# Patient Record
Sex: MALE | Race: WHITE
[De-identification: names, ages, dates, MRNs, and addresses within clinical notes are randomized per-mention and may not be internally consistent; named-entity substitution may affect disease eponyms.]

---

## 2017-01-12 ENCOUNTER — APPOINTMENT (OUTPATIENT)
Dept: RHEUMATOLOGY | Facility: CLINIC | Age: 77
End: 2017-01-12

## 2017-01-31 ENCOUNTER — APPOINTMENT (OUTPATIENT)
Dept: RHEUMATOLOGY | Facility: CLINIC | Age: 77
End: 2017-01-31

## 2017-02-01 ENCOUNTER — RX RENEWAL (OUTPATIENT)
Age: 77
End: 2017-02-01

## 2017-02-01 RX ORDER — FUROSEMIDE 40 MG/1
40 TABLET ORAL
Qty: 90 | Refills: 0 | Status: ACTIVE | COMMUNITY
Start: 2016-10-20

## 2017-02-01 RX ORDER — METHYLPREDNISOLONE 4 MG/1
4 TABLET ORAL
Qty: 21 | Refills: 6 | Status: ACTIVE | COMMUNITY
Start: 2017-02-01 | End: 1900-01-01

## 2021-08-09 ENCOUNTER — INPATIENT (INPATIENT)
Facility: HOSPITAL | Age: 81
LOS: 13 days | Discharge: NURSING, REHAB OR LONG-TERM CARE | DRG: 872 | End: 2021-08-23
Attending: FAMILY MEDICINE | Admitting: FAMILY MEDICINE
Payer: MEDICARE

## 2021-08-09 VITALS
TEMPERATURE: 99 F | SYSTOLIC BLOOD PRESSURE: 154 MMHG | RESPIRATION RATE: 18 BRPM | WEIGHT: 199.96 LBS | DIASTOLIC BLOOD PRESSURE: 69 MMHG | OXYGEN SATURATION: 97 % | HEART RATE: 80 BPM | HEIGHT: 74 IN

## 2021-08-09 DIAGNOSIS — L03.115 CELLULITIS OF RIGHT LOWER LIMB: ICD-10-CM

## 2021-08-09 LAB
ALBUMIN SERPL ELPH-MCNC: 2.5 G/DL — LOW (ref 3.3–5)
ALBUMIN SERPL ELPH-MCNC: 2.9 G/DL — LOW (ref 3.3–5)
ALP SERPL-CCNC: 76 U/L — SIGNIFICANT CHANGE UP (ref 40–120)
ALP SERPL-CCNC: 89 U/L — SIGNIFICANT CHANGE UP (ref 40–120)
ALT FLD-CCNC: 15 U/L — SIGNIFICANT CHANGE UP (ref 12–78)
ALT FLD-CCNC: 16 U/L — SIGNIFICANT CHANGE UP (ref 12–78)
ANION GAP SERPL CALC-SCNC: 6 MMOL/L — SIGNIFICANT CHANGE UP (ref 5–17)
ANION GAP SERPL CALC-SCNC: 6 MMOL/L — SIGNIFICANT CHANGE UP (ref 5–17)
APTT BLD: 32.2 SEC — SIGNIFICANT CHANGE UP (ref 27.5–35.5)
AST SERPL-CCNC: 14 U/L — LOW (ref 15–37)
AST SERPL-CCNC: 21 U/L — SIGNIFICANT CHANGE UP (ref 15–37)
BASOPHILS # BLD AUTO: 0.03 K/UL — SIGNIFICANT CHANGE UP (ref 0–0.2)
BASOPHILS NFR BLD AUTO: 0.2 % — SIGNIFICANT CHANGE UP (ref 0–2)
BILIRUB SERPL-MCNC: 2.2 MG/DL — HIGH (ref 0.2–1.2)
BILIRUB SERPL-MCNC: 2.3 MG/DL — HIGH (ref 0.2–1.2)
BUN SERPL-MCNC: 22 MG/DL — SIGNIFICANT CHANGE UP (ref 7–23)
BUN SERPL-MCNC: 23 MG/DL — SIGNIFICANT CHANGE UP (ref 7–23)
CALCIUM SERPL-MCNC: 8.2 MG/DL — LOW (ref 8.5–10.1)
CALCIUM SERPL-MCNC: 9 MG/DL — SIGNIFICANT CHANGE UP (ref 8.5–10.1)
CHLORIDE SERPL-SCNC: 104 MMOL/L — SIGNIFICANT CHANGE UP (ref 96–108)
CHLORIDE SERPL-SCNC: 104 MMOL/L — SIGNIFICANT CHANGE UP (ref 96–108)
CO2 SERPL-SCNC: 30 MMOL/L — SIGNIFICANT CHANGE UP (ref 22–31)
CO2 SERPL-SCNC: 32 MMOL/L — HIGH (ref 22–31)
CREAT SERPL-MCNC: 1.13 MG/DL — SIGNIFICANT CHANGE UP (ref 0.5–1.3)
CREAT SERPL-MCNC: 1.25 MG/DL — SIGNIFICANT CHANGE UP (ref 0.5–1.3)
EOSINOPHIL # BLD AUTO: 0.01 K/UL — SIGNIFICANT CHANGE UP (ref 0–0.5)
EOSINOPHIL NFR BLD AUTO: 0.1 % — SIGNIFICANT CHANGE UP (ref 0–6)
GLUCOSE SERPL-MCNC: 110 MG/DL — HIGH (ref 70–99)
GLUCOSE SERPL-MCNC: 112 MG/DL — HIGH (ref 70–99)
HCT VFR BLD CALC: 42.1 % — SIGNIFICANT CHANGE UP (ref 39–50)
HGB BLD-MCNC: 14.4 G/DL — SIGNIFICANT CHANGE UP (ref 13–17)
IMM GRANULOCYTES NFR BLD AUTO: 0.4 % — SIGNIFICANT CHANGE UP (ref 0–1.5)
INR BLD: 1.36 RATIO — HIGH (ref 0.88–1.16)
LACTATE SERPL-SCNC: 1.3 MMOL/L — SIGNIFICANT CHANGE UP (ref 0.7–2)
LYMPHOCYTES # BLD AUTO: 0.91 K/UL — LOW (ref 1–3.3)
LYMPHOCYTES # BLD AUTO: 7.3 % — LOW (ref 13–44)
MAGNESIUM SERPL-MCNC: 2 MG/DL — SIGNIFICANT CHANGE UP (ref 1.6–2.6)
MCHC RBC-ENTMCNC: 33.5 PG — SIGNIFICANT CHANGE UP (ref 27–34)
MCHC RBC-ENTMCNC: 34.2 GM/DL — SIGNIFICANT CHANGE UP (ref 32–36)
MCV RBC AUTO: 97.9 FL — SIGNIFICANT CHANGE UP (ref 80–100)
MONOCYTES # BLD AUTO: 1.05 K/UL — HIGH (ref 0–0.9)
MONOCYTES NFR BLD AUTO: 8.5 % — SIGNIFICANT CHANGE UP (ref 2–14)
NEUTROPHILS # BLD AUTO: 10.35 K/UL — HIGH (ref 1.8–7.4)
NEUTROPHILS NFR BLD AUTO: 83.5 % — HIGH (ref 43–77)
NT-PROBNP SERPL-SCNC: 1715 PG/ML — HIGH (ref 0–450)
PLATELET # BLD AUTO: 270 K/UL — SIGNIFICANT CHANGE UP (ref 150–400)
POTASSIUM SERPL-MCNC: 2.2 MMOL/L — CRITICAL LOW (ref 3.5–5.3)
POTASSIUM SERPL-MCNC: 2.5 MMOL/L — CRITICAL LOW (ref 3.5–5.3)
POTASSIUM SERPL-SCNC: 2.2 MMOL/L — CRITICAL LOW (ref 3.5–5.3)
POTASSIUM SERPL-SCNC: 2.5 MMOL/L — CRITICAL LOW (ref 3.5–5.3)
PROT SERPL-MCNC: 6.1 GM/DL — SIGNIFICANT CHANGE UP (ref 6–8.3)
PROT SERPL-MCNC: 7 GM/DL — SIGNIFICANT CHANGE UP (ref 6–8.3)
PROTHROM AB SERPL-ACNC: 15.5 SEC — HIGH (ref 10.6–13.6)
RBC # BLD: 4.3 M/UL — SIGNIFICANT CHANGE UP (ref 4.2–5.8)
RBC # FLD: 13.2 % — SIGNIFICANT CHANGE UP (ref 10.3–14.5)
SARS-COV-2 RNA SPEC QL NAA+PROBE: SIGNIFICANT CHANGE UP
SODIUM SERPL-SCNC: 140 MMOL/L — SIGNIFICANT CHANGE UP (ref 135–145)
SODIUM SERPL-SCNC: 142 MMOL/L — SIGNIFICANT CHANGE UP (ref 135–145)
TROPONIN I SERPL-MCNC: <0.015 NG/ML — SIGNIFICANT CHANGE UP (ref 0.01–0.04)
WBC # BLD: 12.4 K/UL — HIGH (ref 3.8–10.5)
WBC # FLD AUTO: 12.4 K/UL — HIGH (ref 3.8–10.5)

## 2021-08-09 PROCEDURE — 87205 SMEAR GRAM STAIN: CPT

## 2021-08-09 PROCEDURE — 80053 COMPREHEN METABOLIC PANEL: CPT

## 2021-08-09 PROCEDURE — 89060 EXAM SYNOVIAL FLUID CRYSTALS: CPT

## 2021-08-09 PROCEDURE — 36415 COLL VENOUS BLD VENIPUNCTURE: CPT

## 2021-08-09 PROCEDURE — 83735 ASSAY OF MAGNESIUM: CPT

## 2021-08-09 PROCEDURE — 89051 BODY FLUID CELL COUNT: CPT

## 2021-08-09 PROCEDURE — 93970 EXTREMITY STUDY: CPT

## 2021-08-09 PROCEDURE — 71045 X-RAY EXAM CHEST 1 VIEW: CPT | Mod: 26

## 2021-08-09 PROCEDURE — 85027 COMPLETE CBC AUTOMATED: CPT

## 2021-08-09 PROCEDURE — 84100 ASSAY OF PHOSPHORUS: CPT

## 2021-08-09 PROCEDURE — U0005: CPT

## 2021-08-09 PROCEDURE — 85652 RBC SED RATE AUTOMATED: CPT

## 2021-08-09 PROCEDURE — 88305 TISSUE EXAM BY PATHOLOGIST: CPT

## 2021-08-09 PROCEDURE — 73560 X-RAY EXAM OF KNEE 1 OR 2: CPT | Mod: RT

## 2021-08-09 PROCEDURE — 80061 LIPID PANEL: CPT

## 2021-08-09 PROCEDURE — 93970 EXTREMITY STUDY: CPT | Mod: 26

## 2021-08-09 PROCEDURE — 97530 THERAPEUTIC ACTIVITIES: CPT | Mod: GP

## 2021-08-09 PROCEDURE — 87493 C DIFF AMPLIFIED PROBE: CPT

## 2021-08-09 PROCEDURE — 81376 HLA II TYPING 1 LOCUS LR: CPT

## 2021-08-09 PROCEDURE — 93010 ELECTROCARDIOGRAM REPORT: CPT

## 2021-08-09 PROCEDURE — 83036 HEMOGLOBIN GLYCOSYLATED A1C: CPT

## 2021-08-09 PROCEDURE — 94760 N-INVAS EAR/PLS OXIMETRY 1: CPT

## 2021-08-09 PROCEDURE — 97163 PT EVAL HIGH COMPLEX 45 MIN: CPT | Mod: GP

## 2021-08-09 PROCEDURE — 82784 ASSAY IGA/IGD/IGG/IGM EACH: CPT

## 2021-08-09 PROCEDURE — 97116 GAIT TRAINING THERAPY: CPT | Mod: GP

## 2021-08-09 PROCEDURE — 87507 IADNA-DNA/RNA PROBE TQ 12-25: CPT

## 2021-08-09 PROCEDURE — U0003: CPT

## 2021-08-09 PROCEDURE — 86140 C-REACTIVE PROTEIN: CPT

## 2021-08-09 PROCEDURE — 80048 BASIC METABOLIC PNL TOTAL CA: CPT

## 2021-08-09 PROCEDURE — 99285 EMERGENCY DEPT VISIT HI MDM: CPT

## 2021-08-09 PROCEDURE — 94640 AIRWAY INHALATION TREATMENT: CPT

## 2021-08-09 PROCEDURE — 86769 SARS-COV-2 COVID-19 ANTIBODY: CPT

## 2021-08-09 PROCEDURE — 99223 1ST HOSP IP/OBS HIGH 75: CPT

## 2021-08-09 PROCEDURE — 74177 CT ABD & PELVIS W/CONTRAST: CPT

## 2021-08-09 RX ORDER — ONDANSETRON 8 MG/1
4 TABLET, FILM COATED ORAL EVERY 8 HOURS
Refills: 0 | Status: DISCONTINUED | OUTPATIENT
Start: 2021-08-09 | End: 2021-08-23

## 2021-08-09 RX ORDER — CEFTRIAXONE 500 MG/1
INJECTION, POWDER, FOR SOLUTION INTRAMUSCULAR; INTRAVENOUS
Refills: 0 | Status: DISCONTINUED | OUTPATIENT
Start: 2021-08-09 | End: 2021-08-09

## 2021-08-09 RX ORDER — ENOXAPARIN SODIUM 100 MG/ML
40 INJECTION SUBCUTANEOUS DAILY
Refills: 0 | Status: DISCONTINUED | OUTPATIENT
Start: 2021-08-09 | End: 2021-08-23

## 2021-08-09 RX ORDER — CEFTRIAXONE 500 MG/1
1000 INJECTION, POWDER, FOR SOLUTION INTRAMUSCULAR; INTRAVENOUS EVERY 24 HOURS
Refills: 0 | Status: DISCONTINUED | OUTPATIENT
Start: 2021-08-10 | End: 2021-08-15

## 2021-08-09 RX ORDER — ATORVASTATIN CALCIUM 80 MG/1
10 TABLET, FILM COATED ORAL AT BEDTIME
Refills: 0 | Status: DISCONTINUED | OUTPATIENT
Start: 2021-08-09 | End: 2021-08-23

## 2021-08-09 RX ORDER — ACETAMINOPHEN 500 MG
650 TABLET ORAL EVERY 6 HOURS
Refills: 0 | Status: DISCONTINUED | OUTPATIENT
Start: 2021-08-09 | End: 2021-08-23

## 2021-08-09 RX ORDER — TIOTROPIUM BROMIDE 18 UG/1
1 CAPSULE ORAL; RESPIRATORY (INHALATION) DAILY
Refills: 0 | Status: DISCONTINUED | OUTPATIENT
Start: 2021-08-09 | End: 2021-08-23

## 2021-08-09 RX ORDER — ATORVASTATIN CALCIUM 80 MG/1
0 TABLET, FILM COATED ORAL
Qty: 0 | Refills: 0 | DISCHARGE

## 2021-08-09 RX ORDER — METOPROLOL TARTRATE 50 MG
25 TABLET ORAL
Refills: 0 | Status: DISCONTINUED | OUTPATIENT
Start: 2021-08-09 | End: 2021-08-23

## 2021-08-09 RX ORDER — CEFTRIAXONE 500 MG/1
1000 INJECTION, POWDER, FOR SOLUTION INTRAMUSCULAR; INTRAVENOUS ONCE
Refills: 0 | Status: COMPLETED | OUTPATIENT
Start: 2021-08-09 | End: 2021-08-09

## 2021-08-09 RX ORDER — DILTIAZEM HCL 120 MG
240 CAPSULE, EXT RELEASE 24 HR ORAL DAILY
Refills: 0 | Status: DISCONTINUED | OUTPATIENT
Start: 2021-08-09 | End: 2021-08-23

## 2021-08-09 RX ORDER — POTASSIUM CHLORIDE 20 MEQ
0 PACKET (EA) ORAL
Qty: 0 | Refills: 0 | DISCHARGE

## 2021-08-09 RX ORDER — POTASSIUM CHLORIDE 20 MEQ
10 PACKET (EA) ORAL
Refills: 0 | Status: COMPLETED | OUTPATIENT
Start: 2021-08-09 | End: 2021-08-10

## 2021-08-09 RX ORDER — LANOLIN ALCOHOL/MO/W.PET/CERES
3 CREAM (GRAM) TOPICAL AT BEDTIME
Refills: 0 | Status: DISCONTINUED | OUTPATIENT
Start: 2021-08-09 | End: 2021-08-23

## 2021-08-09 RX ORDER — FLUTICASONE PROPIONATE AND SALMETEROL 50; 250 UG/1; UG/1
0 POWDER ORAL; RESPIRATORY (INHALATION)
Qty: 0 | Refills: 0 | DISCHARGE

## 2021-08-09 RX ORDER — TIOTROPIUM BROMIDE 18 UG/1
0 CAPSULE ORAL; RESPIRATORY (INHALATION)
Qty: 0 | Refills: 0 | DISCHARGE

## 2021-08-09 RX ORDER — POTASSIUM CHLORIDE 20 MEQ
10 PACKET (EA) ORAL ONCE
Refills: 0 | Status: COMPLETED | OUTPATIENT
Start: 2021-08-09 | End: 2021-08-09

## 2021-08-09 RX ORDER — POTASSIUM CHLORIDE 20 MEQ
40 PACKET (EA) ORAL ONCE
Refills: 0 | Status: COMPLETED | OUTPATIENT
Start: 2021-08-09 | End: 2021-08-09

## 2021-08-09 RX ORDER — FUROSEMIDE 40 MG
40 TABLET ORAL DAILY
Refills: 0 | Status: DISCONTINUED | OUTPATIENT
Start: 2021-08-10 | End: 2021-08-11

## 2021-08-09 RX ORDER — BUDESONIDE AND FORMOTEROL FUMARATE DIHYDRATE 160; 4.5 UG/1; UG/1
2 AEROSOL RESPIRATORY (INHALATION)
Refills: 0 | Status: DISCONTINUED | OUTPATIENT
Start: 2021-08-09 | End: 2021-08-23

## 2021-08-09 RX ORDER — FOLIC ACID 0.8 MG
1 TABLET ORAL DAILY
Refills: 0 | Status: DISCONTINUED | OUTPATIENT
Start: 2021-08-09 | End: 2021-08-23

## 2021-08-09 RX ORDER — VANCOMYCIN HCL 1 G
1250 VIAL (EA) INTRAVENOUS ONCE
Refills: 0 | Status: COMPLETED | OUTPATIENT
Start: 2021-08-09 | End: 2021-08-09

## 2021-08-09 RX ORDER — FUROSEMIDE 40 MG
80 TABLET ORAL ONCE
Refills: 0 | Status: COMPLETED | OUTPATIENT
Start: 2021-08-09 | End: 2021-08-09

## 2021-08-09 RX ORDER — LISINOPRIL 2.5 MG/1
10 TABLET ORAL DAILY
Refills: 0 | Status: DISCONTINUED | OUTPATIENT
Start: 2021-08-09 | End: 2021-08-23

## 2021-08-09 RX ORDER — METOPROLOL TARTRATE 50 MG
0 TABLET ORAL
Qty: 0 | Refills: 0 | DISCHARGE

## 2021-08-09 RX ORDER — FUROSEMIDE 40 MG
0 TABLET ORAL
Qty: 0 | Refills: 0 | DISCHARGE

## 2021-08-09 RX ORDER — DILTIAZEM HCL 120 MG
0 CAPSULE, EXT RELEASE 24 HR ORAL
Qty: 0 | Refills: 0 | DISCHARGE

## 2021-08-09 RX ORDER — LISINOPRIL 2.5 MG/1
0 TABLET ORAL
Qty: 0 | Refills: 0 | DISCHARGE

## 2021-08-09 RX ORDER — CEFTRIAXONE 500 MG/1
INJECTION, POWDER, FOR SOLUTION INTRAMUSCULAR; INTRAVENOUS
Refills: 0 | Status: DISCONTINUED | OUTPATIENT
Start: 2021-08-09 | End: 2021-08-15

## 2021-08-09 RX ORDER — POTASSIUM CHLORIDE 20 MEQ
20 PACKET (EA) ORAL DAILY
Refills: 0 | Status: DISCONTINUED | OUTPATIENT
Start: 2021-08-09 | End: 2021-08-23

## 2021-08-09 RX ADMIN — Medication 40 MILLIEQUIVALENT(S): at 20:05

## 2021-08-09 RX ADMIN — Medication 166.67 MILLIGRAM(S): at 15:01

## 2021-08-09 RX ADMIN — CEFTRIAXONE 1000 MILLIGRAM(S): 500 INJECTION, POWDER, FOR SOLUTION INTRAMUSCULAR; INTRAVENOUS at 20:05

## 2021-08-09 RX ADMIN — ATORVASTATIN CALCIUM 10 MILLIGRAM(S): 80 TABLET, FILM COATED ORAL at 21:59

## 2021-08-09 RX ADMIN — Medication 80 MILLIGRAM(S): at 15:01

## 2021-08-09 RX ADMIN — Medication 40 MILLIEQUIVALENT(S): at 15:45

## 2021-08-09 RX ADMIN — Medication 25 MILLIGRAM(S): at 21:59

## 2021-08-09 RX ADMIN — Medication 100 MILLIEQUIVALENT(S): at 23:10

## 2021-08-09 RX ADMIN — Medication 100 MILLIEQUIVALENT(S): at 15:45

## 2021-08-09 RX ADMIN — Medication 100 MILLIEQUIVALENT(S): at 22:12

## 2021-08-09 NOTE — ED PROVIDER NOTE - CLINICAL SUMMARY MEDICAL DECISION MAKING FREE TEXT BOX
B/L lower extremity edema, developing right extremity cellulitis. Pt has been noncompliant with Lasix. B/L lower extremity edema, developing right lower extremity cellulitis. Pt has been noncompliant with Lasix. pt states he has no cardiac issues & deosnt have chf

## 2021-08-09 NOTE — ED PROVIDER NOTE - NS ED ROS FT
Constitutional: No fever or chills  Eyes: No visual changes  HEENT: No throat pain  CV: No chest pain  Resp: No SOB no cough  GI: No abd pain, nausea or vomiting  : No dysuria  MSK: +left leg pain  Skin: No rash  Neuro: No headache Constitutional: No fever or chills  Eyes: No visual changes  HEENT: No throat pain  CV: No chest pain  Resp: No SOB no cough  GI: No abd pain, nausea or vomiting  : No dysuria  MSK: +left leg pain, LE edema  Skin: No rash  Neuro: No headache

## 2021-08-09 NOTE — ED PROVIDER NOTE - OBJECTIVE STATEMENT
Pertinent HPI/PMH/PSH/FHx/SHx and Review of Systems contained within  HPI:  Patient p/w edema in left leg  x  3 days, acute on chronic. Pt reports pain in left leg. PCP: Dr. Castillo  PMH/PSH relevant for: COPD, HTN, HLD, Chronic lower extremity edema on 40 mg Lasix  ROS negative for:  fever, CP, SOB  FamilyHx and SocialHx not otherwise contributory Pertinent HPI/PMH/PSH/FHx/SHx and Review of Systems contained within  HPI:  Patient p/w edema in b/l LE edema  x  3 days, acute on chronic. RLE now has weeping wound that is red & hot & mildly painful. finished lasix 1 mo ago & hasn't been taking it since  PCP: Dr. Jiménez  PMH/PSH relevant for: COPD, HTN, HLD, Chronic lower extremity edema on 40 mg Lasix  ROS negative for:  fever, CP, SOB  FamilyHx and SocialHx not otherwise contributory

## 2021-08-09 NOTE — ED ADULT NURSE NOTE - OBJECTIVE STATEMENT
pt is 81 yo male bibems from home presents to ED for b/l edema R>L x 3 days, pt states "I haven't had my lasix for 1 month." +swelling noted over the right knee,

## 2021-08-09 NOTE — ED ADULT TRIAGE NOTE - CHIEF COMPLAINT QUOTE
pt presents to ed via ems from home for evaluation of bilateral leg edema/weeping. pt reports worsening swelling over the last few days, has not been seen by MD. denies fever at home.

## 2021-08-09 NOTE — H&P ADULT - HISTORY OF PRESENT ILLNESS
79 yo male with PMH of COPD, HTN, HLD, hemochromatosis presents to ED with b/l LE edema. Pt has been non compliant with medications. States has ran out of some medications but has not followed up with his PCP. He is on lasix for LE edema but thinks he ran out. states over the last few weeks he noted worsening LE edema and a few days ago noted weeping from RLE. No fevers. Has difficulty ambulating secondary to edema. Pt lives by himself at home. Pt denies SOB or CP. No abd pain, N/V/D.

## 2021-08-09 NOTE — H&P ADULT - ASSESSMENT
79 yo male with PMH of COPD, HTN, HLD, hemochromatosis admitted with:    #b/l LE edema secondary to fluid overload with mild RLE cellulitis  - admit to med-surg  - IV lasix daily  - ceftriaxone IV  - elevate extremities  - doppler to r/o DVT  - PT eval    #COPD  - continue spiriva and advair    #HTN  - continue home meds    #HLD  - continue statin    #DVT prophylaxis  - lovenox    #Advance Directives  - FULL CODE    IMPROVE VTE Individual Risk Assessment    RISK                                                                Points    [  ] Previous VTE                                                  3    [  ] Thrombophilia                                               2    [  ] Lower limb paralysis                                      2        (unable to hold up >15 seconds)      [  ] Current Cancer                                              2         (within 6 months)    [  ] Immobilization > 24 hrs                                1    [  ] ICU/CCU stay > 24 hours                              1    [x  ] Age > 60                                                      1    IMPROVE VTE Score ____1_____    IMPROVE Score 0-1: Low Risk, No VTE prophylaxis required for most patients, encourage ambulation.   IMPROVE Score 2-3: At risk, pharmacologic VTE prophylaxis is indicated for most patients (in the absence of a contraindication)  IMPROVE Score > or = 4: High Risk, pharmacologic VTE prophylaxis is indicated for most patients (in the absence of a contraindication) 81 yo male with PMH of COPD, HTN, HLD, hemochromatosis admitted with:    #b/l LE edema secondary to fluid overload with mild RLE cellulitis  - admit to med-surg  - IV lasix daily  - ceftriaxone IV  - elevate extremities  - doppler to r/o DVT  - PT eval    #Hypokalemia  - s/p 40meq PO and 10mew IV in ED  - will give an additional 40meq PO  - monitor K    #COPD  - continue spiriva and advair    #HTN  - continue home meds    #HLD  - continue statin    #DVT prophylaxis  - lovenox    #Advance Directives  - FULL CODE    IMPROVE VTE Individual Risk Assessment    RISK                                                                Points    [  ] Previous VTE                                                  3    [  ] Thrombophilia                                               2    [  ] Lower limb paralysis                                      2        (unable to hold up >15 seconds)      [  ] Current Cancer                                              2         (within 6 months)    [  ] Immobilization > 24 hrs                                1    [  ] ICU/CCU stay > 24 hours                              1    [x  ] Age > 60                                                      1    IMPROVE VTE Score ____1_____    IMPROVE Score 0-1: Low Risk, No VTE prophylaxis required for most patients, encourage ambulation.   IMPROVE Score 2-3: At risk, pharmacologic VTE prophylaxis is indicated for most patients (in the absence of a contraindication)  IMPROVE Score > or = 4: High Risk, pharmacologic VTE prophylaxis is indicated for most patients (in the absence of a contraindication)

## 2021-08-09 NOTE — H&P ADULT - NSHPPHYSICALEXAM_GEN_ALL_CORE
Vital Signs Last 24 Hrs  T(C): 37.8 (09 Aug 2021 15:34), Max: 37.8 (09 Aug 2021 15:34)  T(F): 100 (09 Aug 2021 15:34), Max: 100 (09 Aug 2021 15:34)  HR: 84 (09 Aug 2021 15:34) (80 - 84)  BP: 156/100 (09 Aug 2021 15:34) (154/69 - 156/100)  BP(mean): 117 (09 Aug 2021 15:34) (117 - 117)  RR: 17 (09 Aug 2021 15:34) (17 - 18)  SpO2: 97% (09 Aug 2021 15:34) (97% - 97%)

## 2021-08-09 NOTE — ED PROVIDER NOTE - CARE PLAN
Principal Discharge DX:	Cellulitis of right lower extremity  Secondary Diagnosis:	Lower extremity edema

## 2021-08-09 NOTE — ED PROVIDER NOTE - PROGRESS NOTE DETAILS
jesús: Discussed need to admit with patient & discussed risk and benefits.  Patient agreed to admission.  Discussed case w/ admitting doctor - agreed to admit to their service. will place bridge orders. Accepting physician said: APPROVE PT TO MOVE    prior to inpatient team evaluation

## 2021-08-09 NOTE — ED PROVIDER NOTE - PHYSICAL EXAMINATION
*GEN: No acute distress, well appearing   *HEAD: Normocephalic, Atraumatic  *EYES/NOSE: b/l Pupils symmetric & Reactive to ligth, EOMI b/l  *THROAT: airway patent, moist mucous membranes  *NECK: Neck supple  *PULMONARY: No Respiratory distress, symmetric b/l chest rise  *CARDIAC: s1s2, regular rhythm   *ABDOMEN:  Non Tender, Non Distended, soft, no guarding, no rebound, no masses   *BACK: no CVA tenderness, No midline vertebral tenderness to palpation   *EXTREMITIES: +b/l lower extremity pitting edema, symmetric pulses, 2+ DP & radial pulses, no cyanosis   *SKIN: no rash, no bruising   *NEUROLOGIC: alert,  full active & passive ROM in all 4 extremities,   *PSYCH: appropriate concern about symptoms, pleasant *GEN: No acute distress, well appearing   *HEAD: Normocephalic, Atraumatic  *EYES/NOSE: b/l Pupils symmetric & Reactive to ligth, EOMI b/l  *THROAT: airway patent, moist mucous membranes  *NECK: Neck supple  *PULMONARY: No Respiratory distress, symmetric b/l chest rise  *CARDIAC: s1s2, regular rhythm   *ABDOMEN:  Non Tender, Non Distended, soft, no guarding, no rebound, no masses   *BACK: no CVA tenderness, No midline vertebral tenderness to palpation   *EXTREMITIES: +b/l lower extremity pitting edema, RLE red hot & swollen, symmetric pulses, 2+ DP & radial pulses, no cyanosis   *SKIN: no rash, no bruising   *NEUROLOGIC: alert,  full active & passive ROM in all 4 extremities,   *PSYCH: appropriate concern about symptoms, pleasant

## 2021-08-09 NOTE — H&P ADULT - NSICDXPASTMEDICALHX_GEN_ALL_CORE_FT
PAST MEDICAL HISTORY:  COPD, moderate     History of hemochromatosis     Hyperlipidemia     Hypertension

## 2021-08-10 LAB
A1C WITH ESTIMATED AVERAGE GLUCOSE RESULT: 5.7 % — HIGH (ref 4–5.6)
ALBUMIN SERPL ELPH-MCNC: 2.4 G/DL — LOW (ref 3.3–5)
ALP SERPL-CCNC: 74 U/L — SIGNIFICANT CHANGE UP (ref 40–120)
ALT FLD-CCNC: 14 U/L — SIGNIFICANT CHANGE UP (ref 12–78)
ANION GAP SERPL CALC-SCNC: 4 MMOL/L — LOW (ref 5–17)
ANION GAP SERPL CALC-SCNC: 5 MMOL/L — SIGNIFICANT CHANGE UP (ref 5–17)
ANION GAP SERPL CALC-SCNC: 6 MMOL/L — SIGNIFICANT CHANGE UP (ref 5–17)
AST SERPL-CCNC: 14 U/L — LOW (ref 15–37)
B PERT IGG+IGM PNL SER: ABNORMAL
BILIRUB SERPL-MCNC: 2 MG/DL — HIGH (ref 0.2–1.2)
BUN SERPL-MCNC: 22 MG/DL — SIGNIFICANT CHANGE UP (ref 7–23)
BUN SERPL-MCNC: 23 MG/DL — SIGNIFICANT CHANGE UP (ref 7–23)
BUN SERPL-MCNC: 24 MG/DL — HIGH (ref 7–23)
CALCIUM SERPL-MCNC: 8.4 MG/DL — LOW (ref 8.5–10.1)
CALCIUM SERPL-MCNC: 8.5 MG/DL — SIGNIFICANT CHANGE UP (ref 8.5–10.1)
CALCIUM SERPL-MCNC: 8.7 MG/DL — SIGNIFICANT CHANGE UP (ref 8.5–10.1)
CHLORIDE SERPL-SCNC: 105 MMOL/L — SIGNIFICANT CHANGE UP (ref 96–108)
CHOLEST SERPL-MCNC: 81 MG/DL — SIGNIFICANT CHANGE UP
CO2 SERPL-SCNC: 31 MMOL/L — SIGNIFICANT CHANGE UP (ref 22–31)
COLOR FLD: SIGNIFICANT CHANGE UP
COVID-19 SPIKE DOMAIN AB INTERP: NEGATIVE — SIGNIFICANT CHANGE UP
COVID-19 SPIKE DOMAIN ANTIBODY RESULT: 0.4 U/ML — SIGNIFICANT CHANGE UP
CREAT SERPL-MCNC: 1.09 MG/DL — SIGNIFICANT CHANGE UP (ref 0.5–1.3)
CREAT SERPL-MCNC: 1.18 MG/DL — SIGNIFICANT CHANGE UP (ref 0.5–1.3)
CREAT SERPL-MCNC: 1.24 MG/DL — SIGNIFICANT CHANGE UP (ref 0.5–1.3)
ERYTHROCYTE [SEDIMENTATION RATE] IN BLOOD: 48 MM/HR — HIGH (ref 0–20)
ESTIMATED AVERAGE GLUCOSE: 117 MG/DL — HIGH (ref 68–114)
FLUID INTAKE SUBSTANCE CLASS: SIGNIFICANT CHANGE UP
FLUID SEGMENTED GRANULOCYTES: 95 % — SIGNIFICANT CHANGE UP
GLUCOSE SERPL-MCNC: 108 MG/DL — HIGH (ref 70–99)
GLUCOSE SERPL-MCNC: 109 MG/DL — HIGH (ref 70–99)
GLUCOSE SERPL-MCNC: 90 MG/DL — SIGNIFICANT CHANGE UP (ref 70–99)
HCT VFR BLD CALC: 37.5 % — LOW (ref 39–50)
HDLC SERPL-MCNC: 33 MG/DL — LOW
HGB BLD-MCNC: 13 G/DL — SIGNIFICANT CHANGE UP (ref 13–17)
LIPID PNL WITH DIRECT LDL SERPL: 38 MG/DL — SIGNIFICANT CHANGE UP
LYMPHOCYTES # FLD: 4 % — SIGNIFICANT CHANGE UP
MAGNESIUM SERPL-MCNC: 2 MG/DL — SIGNIFICANT CHANGE UP (ref 1.6–2.6)
MCHC RBC-ENTMCNC: 34.4 PG — HIGH (ref 27–34)
MCHC RBC-ENTMCNC: 34.7 GM/DL — SIGNIFICANT CHANGE UP (ref 32–36)
MCV RBC AUTO: 99.2 FL — SIGNIFICANT CHANGE UP (ref 80–100)
MONOS+MACROS # FLD: 1 % — SIGNIFICANT CHANGE UP
NON HDL CHOLESTEROL: 49 MG/DL — SIGNIFICANT CHANGE UP
PLATELET # BLD AUTO: 241 K/UL — SIGNIFICANT CHANGE UP (ref 150–400)
POTASSIUM SERPL-MCNC: 2.2 MMOL/L — CRITICAL LOW (ref 3.5–5.3)
POTASSIUM SERPL-MCNC: 2.4 MMOL/L — CRITICAL LOW (ref 3.5–5.3)
POTASSIUM SERPL-MCNC: 2.6 MMOL/L — CRITICAL LOW (ref 3.5–5.3)
POTASSIUM SERPL-SCNC: 2.2 MMOL/L — CRITICAL LOW (ref 3.5–5.3)
POTASSIUM SERPL-SCNC: 2.4 MMOL/L — CRITICAL LOW (ref 3.5–5.3)
POTASSIUM SERPL-SCNC: 2.6 MMOL/L — CRITICAL LOW (ref 3.5–5.3)
PROT SERPL-MCNC: 5.8 GM/DL — LOW (ref 6–8.3)
RBC # BLD: 3.78 M/UL — LOW (ref 4.2–5.8)
RBC # FLD: 13.2 % — SIGNIFICANT CHANGE UP (ref 10.3–14.5)
RCV VOL RI: HIGH /UL (ref 0–0)
SARS-COV-2 IGG+IGM SERPL QL IA: 0.4 U/ML — SIGNIFICANT CHANGE UP
SARS-COV-2 IGG+IGM SERPL QL IA: NEGATIVE — SIGNIFICANT CHANGE UP
SODIUM SERPL-SCNC: 140 MMOL/L — SIGNIFICANT CHANGE UP (ref 135–145)
SODIUM SERPL-SCNC: 141 MMOL/L — SIGNIFICANT CHANGE UP (ref 135–145)
SODIUM SERPL-SCNC: 142 MMOL/L — SIGNIFICANT CHANGE UP (ref 135–145)
SYNOVIAL CRYSTALS CLARITY: ABNORMAL
SYNOVIAL CRYSTALS COLOR: YELLOW
SYNOVIAL CRYSTALS ID: ABNORMAL
SYNOVIAL CRYSTALS TUBE: SIGNIFICANT CHANGE UP
TOTAL NUCLEATED CELL COUNT, BODY FLUID: 8333 /UL — SIGNIFICANT CHANGE UP
TRIGL SERPL-MCNC: 52 MG/DL — SIGNIFICANT CHANGE UP
TUBE TYPE: SIGNIFICANT CHANGE UP
WBC # BLD: 11.25 K/UL — HIGH (ref 3.8–10.5)
WBC # FLD AUTO: 11.25 K/UL — HIGH (ref 3.8–10.5)

## 2021-08-10 PROCEDURE — 73560 X-RAY EXAM OF KNEE 1 OR 2: CPT | Mod: 26,RT

## 2021-08-10 PROCEDURE — 99233 SBSQ HOSP IP/OBS HIGH 50: CPT

## 2021-08-10 RX ORDER — POTASSIUM CHLORIDE 20 MEQ
10 PACKET (EA) ORAL
Refills: 0 | Status: COMPLETED | OUTPATIENT
Start: 2021-08-10 | End: 2021-08-10

## 2021-08-10 RX ORDER — POTASSIUM CHLORIDE 20 MEQ
40 PACKET (EA) ORAL EVERY 4 HOURS
Refills: 0 | Status: COMPLETED | OUTPATIENT
Start: 2021-08-10 | End: 2021-08-10

## 2021-08-10 RX ADMIN — LISINOPRIL 10 MILLIGRAM(S): 2.5 TABLET ORAL at 10:01

## 2021-08-10 RX ADMIN — ATORVASTATIN CALCIUM 10 MILLIGRAM(S): 80 TABLET, FILM COATED ORAL at 22:12

## 2021-08-10 RX ADMIN — TIOTROPIUM BROMIDE 1 CAPSULE(S): 18 CAPSULE ORAL; RESPIRATORY (INHALATION) at 09:11

## 2021-08-10 RX ADMIN — Medication 40 MILLIGRAM(S): at 12:00

## 2021-08-10 RX ADMIN — Medication 40 MILLIEQUIVALENT(S): at 10:01

## 2021-08-10 RX ADMIN — Medication 20 MILLIEQUIVALENT(S): at 12:00

## 2021-08-10 RX ADMIN — Medication 1 MILLIGRAM(S): at 10:01

## 2021-08-10 RX ADMIN — Medication 100 MILLIEQUIVALENT(S): at 06:17

## 2021-08-10 RX ADMIN — Medication 100 MILLIEQUIVALENT(S): at 15:37

## 2021-08-10 RX ADMIN — Medication 25 MILLIGRAM(S): at 22:12

## 2021-08-10 RX ADMIN — Medication 100 MILLIEQUIVALENT(S): at 00:06

## 2021-08-10 RX ADMIN — BUDESONIDE AND FORMOTEROL FUMARATE DIHYDRATE 2 PUFF(S): 160; 4.5 AEROSOL RESPIRATORY (INHALATION) at 09:11

## 2021-08-10 RX ADMIN — CEFTRIAXONE 1000 MILLIGRAM(S): 500 INJECTION, POWDER, FOR SOLUTION INTRAMUSCULAR; INTRAVENOUS at 22:12

## 2021-08-10 RX ADMIN — Medication 100 MILLIEQUIVALENT(S): at 03:33

## 2021-08-10 RX ADMIN — Medication 100 MILLIEQUIVALENT(S): at 05:16

## 2021-08-10 RX ADMIN — ENOXAPARIN SODIUM 40 MILLIGRAM(S): 100 INJECTION SUBCUTANEOUS at 10:00

## 2021-08-10 RX ADMIN — Medication 240 MILLIGRAM(S): at 10:00

## 2021-08-10 RX ADMIN — Medication 25 MILLIGRAM(S): at 10:01

## 2021-08-10 RX ADMIN — BUDESONIDE AND FORMOTEROL FUMARATE DIHYDRATE 2 PUFF(S): 160; 4.5 AEROSOL RESPIRATORY (INHALATION) at 20:20

## 2021-08-10 RX ADMIN — Medication 40 MILLIEQUIVALENT(S): at 05:17

## 2021-08-10 RX ADMIN — Medication 100 MILLIEQUIVALENT(S): at 13:53

## 2021-08-10 RX ADMIN — Medication 100 MILLIEQUIVALENT(S): at 11:59

## 2021-08-10 NOTE — PHYSICAL THERAPY INITIAL EVALUATION ADULT - MODALITIES TREATMENT COMMENTS
pt left seated in chair post Eval; chair alarm donned; callbell in reach; pt instructed not to get up alone; call nursing for assist; nai well; denied pain; RN Patti made aware pt OOB

## 2021-08-10 NOTE — CONSULT NOTE ADULT - SUBJECTIVE AND OBJECTIVE BOX
Asked to evaluate 80y Male who is a community ambulator c/o Right knee pain. Pt admitted to hospital with b/l LE edema and R lower extremity cellulitis. Pt states he has been told he has arthritis in his knees and has had intermittent b/l knee pain in the past, but now c/o R knee pain and swelling which has been improving over the past few days. Denies hx of gout. Denies numbness/tingling. Denies fever/chills. Denies pain/injury elsewhere.     HEALTH ISSUES - PROBLEM Dx:        PAST MEDICAL & SURGICAL HISTORY:  COPD, moderate    Hypertension    Hyperlipidemia    History of hemochromatosis      MEDICATIONS  (STANDING):  atorvastatin 10 milliGRAM(s) Oral at bedtime  budesonide 160 MICROgram(s)/formoterol 4.5 MICROgram(s) Inhaler 2 Puff(s) Inhalation two times a day  cefTRIAXone Injectable.      cefTRIAXone Injectable. 1000 milliGRAM(s) IV Push every 24 hours  diltiazem    milliGRAM(s) Oral daily  enoxaparin Injectable 40 milliGRAM(s) SubCutaneous daily  folic acid 1 milliGRAM(s) Oral daily  furosemide   Injectable 40 milliGRAM(s) IV Push daily  lisinopril 10 milliGRAM(s) Oral daily  metoprolol tartrate 25 milliGRAM(s) Oral two times a day  potassium chloride    Tablet ER 20 milliEquivalent(s) Oral daily  tiotropium 18 MICROgram(s) Capsule 1 Capsule(s) Inhalation daily    Allergies    No Known Allergies    Intolerances                            13.0   11.25 )-----------( 241      ( 10 Aug 2021 08:17 )             37.5     10 Aug 2021 08:17    142    |  105    |  23     ----------------------------<  90     2.6     |  31     |  1.09     Ca    8.7        10 Aug 2021 08:17  Mg     2.0       10 Aug 2021 08:17    TPro  5.8    /  Alb  2.4    /  TBili  2.0    /  DBili  x      /  AST  14     /  ALT  14     /  AlkPhos  74     09 Aug 2021 23:05    PT/INR - ( 09 Aug 2021 14:46 )   PT: 15.5 sec;   INR: 1.36 ratio         PTT - ( 09 Aug 2021 14:46 )  PTT:32.2 sec  Vital Signs Last 24 Hrs  T(C): 36.8 (08-10-21 @ 08:41), Max: 37.5 (08-09-21 @ 20:31)  T(F): 98.2 (08-10-21 @ 08:41), Max: 99.5 (08-09-21 @ 20:31)  HR: 98 (08-10-21 @ 09:06) (87 - 114)  BP: 147/79 (08-10-21 @ 08:41) (119/77 - 153/93)  BP(mean): 107 (08-09-21 @ 20:31) (107 - 107)  RR: 18 (08-10-21 @ 08:41) (14 - 18)  SpO2: 97% (08-10-21 @ 08:41) (97% - 98%)    Imaging: XR demonstrates Right knee OA     Physical Exam  Gen: NAD, resting comfortably with family at bedside   Right LE: skin intact over knee, draining ulcer over distal shin without surrounding erythema,  No erythema or ecchymosis over knee. No warmth compared to the contralateral side. AROM limited to: 0-90, no pain with micro motion of knee.  Able to SLR, Neg log roll, Negative Heel strike, no ttp elsewhere, +EHL/FHL/TA/GS function, DP/PT pulses palpable, compartments soft. Calf soft, nontender.      A/P: 80y Male with Right Knee pain c/w arthritic flare  Analgesia  Antiinflammatories as tolerated  WBAT, rolling walker, PT  ICE/Cryotherapy  DVT PE ppx  Follow up ESR/CRP  Will discuss case with Dr France and advise plan Asked to evaluate 80y Male who is a community ambulator c/o Right knee pain. Pt admitted to hospital with b/l LE edema and R lower extremity cellulitis. Pt states he has been told he has arthritis in his knees and has had intermittent b/l knee pain in the past, but now c/o R knee pain and swelling which has been improving over the past few days. Denies hx of gout. Denies numbness/tingling. Denies fever/chills. Denies pain/injury elsewhere.     HEALTH ISSUES - PROBLEM Dx:        PAST MEDICAL & SURGICAL HISTORY:  COPD, moderate    Hypertension    Hyperlipidemia    History of hemochromatosis      MEDICATIONS  (STANDING):  atorvastatin 10 milliGRAM(s) Oral at bedtime  budesonide 160 MICROgram(s)/formoterol 4.5 MICROgram(s) Inhaler 2 Puff(s) Inhalation two times a day  cefTRIAXone Injectable.      cefTRIAXone Injectable. 1000 milliGRAM(s) IV Push every 24 hours  diltiazem    milliGRAM(s) Oral daily  enoxaparin Injectable 40 milliGRAM(s) SubCutaneous daily  folic acid 1 milliGRAM(s) Oral daily  furosemide   Injectable 40 milliGRAM(s) IV Push daily  lisinopril 10 milliGRAM(s) Oral daily  metoprolol tartrate 25 milliGRAM(s) Oral two times a day  potassium chloride    Tablet ER 20 milliEquivalent(s) Oral daily  tiotropium 18 MICROgram(s) Capsule 1 Capsule(s) Inhalation daily    Allergies    No Known Allergies    Intolerances                            13.0   11.25 )-----------( 241      ( 10 Aug 2021 08:17 )             37.5     10 Aug 2021 08:17    142    |  105    |  23     ----------------------------<  90     2.6     |  31     |  1.09     Ca    8.7        10 Aug 2021 08:17  Mg     2.0       10 Aug 2021 08:17    TPro  5.8    /  Alb  2.4    /  TBili  2.0    /  DBili  x      /  AST  14     /  ALT  14     /  AlkPhos  74     09 Aug 2021 23:05    PT/INR - ( 09 Aug 2021 14:46 )   PT: 15.5 sec;   INR: 1.36 ratio         PTT - ( 09 Aug 2021 14:46 )  PTT:32.2 sec  Vital Signs Last 24 Hrs  T(C): 36.8 (08-10-21 @ 08:41), Max: 37.5 (08-09-21 @ 20:31)  T(F): 98.2 (08-10-21 @ 08:41), Max: 99.5 (08-09-21 @ 20:31)  HR: 98 (08-10-21 @ 09:06) (87 - 114)  BP: 147/79 (08-10-21 @ 08:41) (119/77 - 153/93)  BP(mean): 107 (08-09-21 @ 20:31) (107 - 107)  RR: 18 (08-10-21 @ 08:41) (14 - 18)  SpO2: 97% (08-10-21 @ 08:41) (97% - 98%)    Imaging: XR demonstrates Right knee OA     Physical Exam  Gen: NAD, resting comfortably with family at bedside   Right LE: skin intact over knee, draining ulcer over distal shin without surrounding erythema,  No erythema or ecchymosis over knee. No warmth compared to the contralateral side. AROM limited to: 0-90, no pain with micro motion of knee.  Able to SLR, Neg log roll, Negative Heel strike, no ttp elsewhere, +EHL/FHL/TA/GS function, DP/PT pulses palpable, compartments soft. Calf soft, nontender.      A/P: 80y Male with Right Knee pain c/w arthritic flare  Will aspirate R knee   Analgesia  Antiinflammatories as tolerated  WBAT, rolling walker, PT  ICE/Cryotherapy  DVT PE ppx  Follow up ESR/CRP  Will discuss case with Dr France and advise plan Asked to evaluate 80y Male who is a community ambulator c/o Right knee pain. Pt admitted to hospital with b/l LE edema and R lower extremity cellulitis. Pt states he has been told he has arthritis in his knees and has had intermittent b/l knee pain in the past, but now c/o R knee pain and swelling which has been improving over the past few days. Denies hx of gout. Denies numbness/tingling. Denies fever/chills. Denies pain/injury elsewhere.     HEALTH ISSUES - PROBLEM Dx:        PAST MEDICAL & SURGICAL HISTORY:  COPD, moderate    Hypertension    Hyperlipidemia    History of hemochromatosis      MEDICATIONS  (STANDING):  atorvastatin 10 milliGRAM(s) Oral at bedtime  budesonide 160 MICROgram(s)/formoterol 4.5 MICROgram(s) Inhaler 2 Puff(s) Inhalation two times a day  cefTRIAXone Injectable.      cefTRIAXone Injectable. 1000 milliGRAM(s) IV Push every 24 hours  diltiazem    milliGRAM(s) Oral daily  enoxaparin Injectable 40 milliGRAM(s) SubCutaneous daily  folic acid 1 milliGRAM(s) Oral daily  furosemide   Injectable 40 milliGRAM(s) IV Push daily  lisinopril 10 milliGRAM(s) Oral daily  metoprolol tartrate 25 milliGRAM(s) Oral two times a day  potassium chloride    Tablet ER 20 milliEquivalent(s) Oral daily  tiotropium 18 MICROgram(s) Capsule 1 Capsule(s) Inhalation daily    Allergies    No Known Allergies    Intolerances                            13.0   11.25 )-----------( 241      ( 10 Aug 2021 08:17 )             37.5     10 Aug 2021 08:17    142    |  105    |  23     ----------------------------<  90     2.6     |  31     |  1.09     Ca    8.7        10 Aug 2021 08:17  Mg     2.0       10 Aug 2021 08:17    TPro  5.8    /  Alb  2.4    /  TBili  2.0    /  DBili  x      /  AST  14     /  ALT  14     /  AlkPhos  74     09 Aug 2021 23:05    PT/INR - ( 09 Aug 2021 14:46 )   PT: 15.5 sec;   INR: 1.36 ratio         PTT - ( 09 Aug 2021 14:46 )  PTT:32.2 sec  Vital Signs Last 24 Hrs  T(C): 36.8 (08-10-21 @ 08:41), Max: 37.5 (08-09-21 @ 20:31)  T(F): 98.2 (08-10-21 @ 08:41), Max: 99.5 (08-09-21 @ 20:31)  HR: 98 (08-10-21 @ 09:06) (87 - 114)  BP: 147/79 (08-10-21 @ 08:41) (119/77 - 153/93)  BP(mean): 107 (08-09-21 @ 20:31) (107 - 107)  RR: 18 (08-10-21 @ 08:41) (14 - 18)  SpO2: 97% (08-10-21 @ 08:41) (97% - 98%)    Imaging: XR demonstrates Right knee OA     Physical Exam  Gen: NAD, resting comfortably with family at bedside   Right LE: skin intact over knee, draining ulcer over distal shin without surrounding erythema,  No erythema or ecchymosis over knee. No warmth compared to the contralateral side. AROM limited to: 0-90, no pain with micro motion of knee.  Able to SLR, Neg log roll, Negative Heel strike, no ttp elsewhere, +EHL/FHL/TA/GS function, DP/PT pulses palpable, compartments soft. Calf soft, nontender.      A/P: 80y Male with Right Knee pain c/w arthritic flare  F/U R knee aspiration fluid   Analgesia  Antiinflammatories as tolerated  WBAT, rolling walker, PT  ICE/Cryotherapy  DVT PE ppx  Follow up ESR/CRP  Will discuss case with Dr France and advise plan Asked to evaluate 80y Male who is a community ambulator c/o Right knee pain. Pt admitted to hospital with b/l LE edema and R lower extremity cellulitis. Pt states he has been told he has arthritis in his knees and has had intermittent b/l knee pain in the past, but now c/o R knee pain and swelling which has been improving over the past few days. Denies hx of gout. Denies numbness/tingling. Denies fever/chills. Denies pain/injury elsewhere.     HEALTH ISSUES - PROBLEM Dx:        PAST MEDICAL & SURGICAL HISTORY:  COPD, moderate    Hypertension    Hyperlipidemia    History of hemochromatosis      MEDICATIONS  (STANDING):  atorvastatin 10 milliGRAM(s) Oral at bedtime  budesonide 160 MICROgram(s)/formoterol 4.5 MICROgram(s) Inhaler 2 Puff(s) Inhalation two times a day  cefTRIAXone Injectable.      cefTRIAXone Injectable. 1000 milliGRAM(s) IV Push every 24 hours  diltiazem    milliGRAM(s) Oral daily  enoxaparin Injectable 40 milliGRAM(s) SubCutaneous daily  folic acid 1 milliGRAM(s) Oral daily  furosemide   Injectable 40 milliGRAM(s) IV Push daily  lisinopril 10 milliGRAM(s) Oral daily  metoprolol tartrate 25 milliGRAM(s) Oral two times a day  potassium chloride    Tablet ER 20 milliEquivalent(s) Oral daily  tiotropium 18 MICROgram(s) Capsule 1 Capsule(s) Inhalation daily    Allergies    No Known Allergies    Intolerances                            13.0   11.25 )-----------( 241      ( 10 Aug 2021 08:17 )             37.5     10 Aug 2021 08:17    142    |  105    |  23     ----------------------------<  90     2.6     |  31     |  1.09     Ca    8.7        10 Aug 2021 08:17  Mg     2.0       10 Aug 2021 08:17    TPro  5.8    /  Alb  2.4    /  TBili  2.0    /  DBili  x      /  AST  14     /  ALT  14     /  AlkPhos  74     09 Aug 2021 23:05    PT/INR - ( 09 Aug 2021 14:46 )   PT: 15.5 sec;   INR: 1.36 ratio         PTT - ( 09 Aug 2021 14:46 )  PTT:32.2 sec  Vital Signs Last 24 Hrs  T(C): 36.8 (08-10-21 @ 08:41), Max: 37.5 (08-09-21 @ 20:31)  T(F): 98.2 (08-10-21 @ 08:41), Max: 99.5 (08-09-21 @ 20:31)  HR: 98 (08-10-21 @ 09:06) (87 - 114)  BP: 147/79 (08-10-21 @ 08:41) (119/77 - 153/93)  BP(mean): 107 (08-09-21 @ 20:31) (107 - 107)  RR: 18 (08-10-21 @ 08:41) (14 - 18)  SpO2: 97% (08-10-21 @ 08:41) (97% - 98%)    Imaging: XR demonstrates Right knee OA     Physical Exam  Gen: NAD, resting comfortably with family at bedside   Right LE: skin intact over knee, draining ulcer over distal shin without surrounding erythema,  No erythema or ecchymosis over knee. No warmth compared to the contralateral side. AROM limited to: 0-90, no pain with micro motion of knee.  Able to SLR, Neg log roll, Negative Heel strike, no ttp elsewhere, +EHL/FHL/TA/GS function, DP/PT pulses palpable, compartments soft. Calf soft, nontender.      A/P: 80y Male with Right Knee pain   Aspiration of R knee yielded +MSU and CPP   Analgesia  Antiinflammatories as tolerated  WBAT, rolling walker, PT  ICE/Cryotherapy  DVT PE ppx  Follow up ESR/CRP  No further orthopedic intervention at this time   Case discussed with Dr France who agrees iwth the plan above

## 2021-08-11 LAB
ALBUMIN SERPL ELPH-MCNC: 2.1 G/DL — LOW (ref 3.3–5)
ALP SERPL-CCNC: 82 U/L — SIGNIFICANT CHANGE UP (ref 40–120)
ALT FLD-CCNC: 17 U/L — SIGNIFICANT CHANGE UP (ref 12–78)
ANION GAP SERPL CALC-SCNC: 6 MMOL/L — SIGNIFICANT CHANGE UP (ref 5–17)
AST SERPL-CCNC: 18 U/L — SIGNIFICANT CHANGE UP (ref 15–37)
BILIRUB SERPL-MCNC: 1.7 MG/DL — HIGH (ref 0.2–1.2)
BUN SERPL-MCNC: 26 MG/DL — HIGH (ref 7–23)
C DIFF BY PCR RESULT: SIGNIFICANT CHANGE UP
C DIFF TOX GENS STL QL NAA+PROBE: SIGNIFICANT CHANGE UP
CALCIUM SERPL-MCNC: 8.2 MG/DL — LOW (ref 8.5–10.1)
CHLORIDE SERPL-SCNC: 105 MMOL/L — SIGNIFICANT CHANGE UP (ref 96–108)
CO2 SERPL-SCNC: 32 MMOL/L — HIGH (ref 22–31)
CREAT SERPL-MCNC: 1.08 MG/DL — SIGNIFICANT CHANGE UP (ref 0.5–1.3)
CRP SERPL-MCNC: 226 MG/L — HIGH
CRP SERPL-MCNC: 236 MG/L — HIGH
CULTURE RESULTS: SIGNIFICANT CHANGE UP
ERYTHROCYTE [SEDIMENTATION RATE] IN BLOOD: 48 MM/HR — HIGH (ref 0–20)
GLUCOSE SERPL-MCNC: 109 MG/DL — HIGH (ref 70–99)
HCT VFR BLD CALC: 37.4 % — LOW (ref 39–50)
HGB BLD-MCNC: 13 G/DL — SIGNIFICANT CHANGE UP (ref 13–17)
MAGNESIUM SERPL-MCNC: 1.8 MG/DL — SIGNIFICANT CHANGE UP (ref 1.6–2.6)
MCHC RBC-ENTMCNC: 34.3 PG — HIGH (ref 27–34)
MCHC RBC-ENTMCNC: 34.8 GM/DL — SIGNIFICANT CHANGE UP (ref 32–36)
MCV RBC AUTO: 98.7 FL — SIGNIFICANT CHANGE UP (ref 80–100)
PHOSPHATE SERPL-MCNC: 2.3 MG/DL — LOW (ref 2.5–4.5)
PLATELET # BLD AUTO: 273 K/UL — SIGNIFICANT CHANGE UP (ref 150–400)
POTASSIUM SERPL-MCNC: 2.5 MMOL/L — CRITICAL LOW (ref 3.5–5.3)
POTASSIUM SERPL-SCNC: 2.5 MMOL/L — CRITICAL LOW (ref 3.5–5.3)
PROT SERPL-MCNC: 6 GM/DL — SIGNIFICANT CHANGE UP (ref 6–8.3)
RBC # BLD: 3.79 M/UL — LOW (ref 4.2–5.8)
RBC # FLD: 13.1 % — SIGNIFICANT CHANGE UP (ref 10.3–14.5)
SODIUM SERPL-SCNC: 143 MMOL/L — SIGNIFICANT CHANGE UP (ref 135–145)
SPECIMEN SOURCE: SIGNIFICANT CHANGE UP
WBC # BLD: 12.59 K/UL — HIGH (ref 3.8–10.5)
WBC # FLD AUTO: 12.59 K/UL — HIGH (ref 3.8–10.5)

## 2021-08-11 PROCEDURE — 99232 SBSQ HOSP IP/OBS MODERATE 35: CPT

## 2021-08-11 RX ORDER — POTASSIUM CHLORIDE 20 MEQ
40 PACKET (EA) ORAL EVERY 4 HOURS
Refills: 0 | Status: COMPLETED | OUTPATIENT
Start: 2021-08-11 | End: 2021-08-11

## 2021-08-11 RX ORDER — SODIUM,POTASSIUM PHOSPHATES 278-250MG
1 POWDER IN PACKET (EA) ORAL THREE TIMES A DAY
Refills: 0 | Status: COMPLETED | OUTPATIENT
Start: 2021-08-11 | End: 2021-08-12

## 2021-08-11 RX ORDER — LOPERAMIDE HCL 2 MG
2 TABLET ORAL THREE TIMES A DAY
Refills: 0 | Status: DISCONTINUED | OUTPATIENT
Start: 2021-08-11 | End: 2021-08-14

## 2021-08-11 RX ORDER — LOPERAMIDE HCL 2 MG
2 TABLET ORAL EVERY 8 HOURS
Refills: 0 | Status: DISCONTINUED | OUTPATIENT
Start: 2021-08-11 | End: 2021-08-11

## 2021-08-11 RX ADMIN — Medication 2 MILLIGRAM(S): at 17:01

## 2021-08-11 RX ADMIN — ENOXAPARIN SODIUM 40 MILLIGRAM(S): 100 INJECTION SUBCUTANEOUS at 09:45

## 2021-08-11 RX ADMIN — Medication 40 MILLIEQUIVALENT(S): at 15:54

## 2021-08-11 RX ADMIN — Medication 1 PACKET(S): at 11:41

## 2021-08-11 RX ADMIN — Medication 1 PACKET(S): at 21:05

## 2021-08-11 RX ADMIN — BUDESONIDE AND FORMOTEROL FUMARATE DIHYDRATE 2 PUFF(S): 160; 4.5 AEROSOL RESPIRATORY (INHALATION) at 21:02

## 2021-08-11 RX ADMIN — ATORVASTATIN CALCIUM 10 MILLIGRAM(S): 80 TABLET, FILM COATED ORAL at 21:05

## 2021-08-11 RX ADMIN — Medication 40 MILLIGRAM(S): at 09:45

## 2021-08-11 RX ADMIN — Medication 25 MILLIGRAM(S): at 09:43

## 2021-08-11 RX ADMIN — Medication 40 MILLIGRAM(S): at 09:46

## 2021-08-11 RX ADMIN — Medication 40 MILLIEQUIVALENT(S): at 11:41

## 2021-08-11 RX ADMIN — Medication 40 MILLIEQUIVALENT(S): at 21:05

## 2021-08-11 RX ADMIN — BUDESONIDE AND FORMOTEROL FUMARATE DIHYDRATE 2 PUFF(S): 160; 4.5 AEROSOL RESPIRATORY (INHALATION) at 08:43

## 2021-08-11 RX ADMIN — Medication 1 MILLIGRAM(S): at 09:43

## 2021-08-11 RX ADMIN — Medication 240 MILLIGRAM(S): at 09:45

## 2021-08-11 RX ADMIN — CEFTRIAXONE 1000 MILLIGRAM(S): 500 INJECTION, POWDER, FOR SOLUTION INTRAMUSCULAR; INTRAVENOUS at 21:04

## 2021-08-11 RX ADMIN — Medication 20 MILLIEQUIVALENT(S): at 09:43

## 2021-08-11 RX ADMIN — TIOTROPIUM BROMIDE 1 CAPSULE(S): 18 CAPSULE ORAL; RESPIRATORY (INHALATION) at 08:43

## 2021-08-11 RX ADMIN — LISINOPRIL 10 MILLIGRAM(S): 2.5 TABLET ORAL at 09:43

## 2021-08-11 RX ADMIN — Medication 2 MILLIGRAM(S): at 21:05

## 2021-08-11 NOTE — CONSULT NOTE ADULT - ASSESSMENT
79 yo male with PMH of COPD, HTN, HLD, hemochromatosis admitted on 8/9 for evaluation of right knee pain; afebrile, patient has difficulty ambulating secondary to edema; has had an aspirate of right knee, found to have crystals in the joint. No fever chills or any other complaints.     1. Patient admitted with crystalline arthropathy, superimposed cellulitis of the right knee; also noted with leukocytosis most likely reactive  - follow up cultures   - serial cbc and monitor temperature   - reviewed prior medical records to evaluate for resistant or atypical pathogens   - iv hydration and supportive care   - wound care  - will continue ceftriaxone as ordered  - rx for gout  2. other issues; per medicine

## 2021-08-11 NOTE — CONSULT NOTE ADULT - SUBJECTIVE AND OBJECTIVE BOX
GI consult  late note entry-seen at 1130 am    HPI:  81 yo male with PMH of COPD, HTN, HLD, hemochromatosis presents to ED with b/l LE edema. Pt has been non compliant with medications. States has ran out of some medications but has not followed up with his PCP. He is on lasix for LE edema but thinks he ran out. states over the last few weeks he noted worsening LE edema and a few days ago noted weeping from RLE. No fevers. Has difficulty ambulating secondary to edema. Pt lives by himself at home. Pt denies SOB or CP. No abd pain, N/V/D.  (09 Aug 2021 17:38)    Called to evaluate diarrhea.  The patient is currently having several episodes of diarrhea described as watery and nonbloody each day.  He is having about 3 episodes now in the hospital but states his diarrhea is chronic and he has had these episodes for quite some time.  He has lost 40 pounds over the past 3 years.  He is not a "Doctor person" and has not had this evaluated.  He has never had colonoscopy.  He denies abdominal pain.  He has recently been on antibiotics but his stool test for C. difficile was negative today.  His stool cultures were negative.  He has tried various over-the-counter medications with paradoxical worsening of his symptoms.  He was offered colonoscopy but declines.    PAST MEDICAL & SURGICAL HISTORY:  COPD, moderate    Hypertension    Hyperlipidemia    History of hemochromatosis        Home Medications:  Advair Diskus 250 mcg-50 mcg inhalation powder: 1 puff(s) inhaled once a day (11 Aug 2021 11:56)  atorvastatin 10 mg oral tablet: 1 tab(s) orally once a day (11 Aug 2021 11:56)  DilTIAZem (Eqv-Cardizem CD) 240 mg/24 hours oral capsule, extended release: 1 cap(s) orally once a day (11 Aug 2021 11:56)  folic acid 1 mg oral tablet: 1 tab(s) orally once a day (11 Aug 2021 11:56)  furosemide 40 mg oral tablet: 1 tab(s) orally once a day (11 Aug 2021 11:56)  lisinopril 10 mg oral tablet: 1 tab(s) orally once a day (11 Aug 2021 11:56)  metoprolol tartrate 50 mg oral tablet: 0.5 tab(s) orally 2 times a day (11 Aug 2021 11:56)  Potassium Chloride (Eqv-Klor-Con M20) 20 mEq oral tablet, extended release: 4 tab(s) orally once a day (11 Aug 2021 11:56)  Spiriva HandiHaler 18 mcg inhalation capsule: 1 cap(s) inhaled once a day (11 Aug 2021 11:56)      MEDICATIONS  (STANDING):  atorvastatin 10 milliGRAM(s) Oral at bedtime  budesonide 160 MICROgram(s)/formoterol 4.5 MICROgram(s) Inhaler 2 Puff(s) Inhalation two times a day  cefTRIAXone Injectable.      cefTRIAXone Injectable. 1000 milliGRAM(s) IV Push every 24 hours  diltiazem    milliGRAM(s) Oral daily  enoxaparin Injectable 40 milliGRAM(s) SubCutaneous daily  folic acid 1 milliGRAM(s) Oral daily  lisinopril 10 milliGRAM(s) Oral daily  metoprolol tartrate 25 milliGRAM(s) Oral two times a day  potassium chloride    Tablet ER 20 milliEquivalent(s) Oral daily  potassium chloride    Tablet ER 40 milliEquivalent(s) Oral every 4 hours  potassium phosphate / sodium phosphate Powder (PHOS-NaK) 1 Packet(s) Oral three times a day  predniSONE   Tablet 40 milliGRAM(s) Oral daily  tiotropium 18 MICROgram(s) Capsule 1 Capsule(s) Inhalation daily    MEDICATIONS  (PRN):  acetaminophen   Tablet .. 650 milliGRAM(s) Oral every 6 hours PRN Temp greater or equal to 38.5C (101.3F), Mild Pain (1 - 3)  aluminum hydroxide/magnesium hydroxide/simethicone Suspension 30 milliLiter(s) Oral every 4 hours PRN Dyspepsia  loperamide 2 milliGRAM(s) Oral every 8 hours PRN Diarrhea  melatonin 3 milliGRAM(s) Oral at bedtime PRN Insomnia  ondansetron Injectable 4 milliGRAM(s) IV Push every 8 hours PRN Nausea and/or Vomiting      Allergies    No Known Allergies    Intolerances        SOCIAL HISTORY: NC    FAMILY HISTORY:  FH: breast cancer (Mother)        ROS  As above  Otherwise unremarkable, all systems reviewed    PE:  Vital Signs Last 24 Hrs  T(C): 36.7 (11 Aug 2021 15:15), Max: 37.4 (10 Aug 2021 22:10)  T(F): 98.1 (11 Aug 2021 15:15), Max: 99.3 (10 Aug 2021 22:10)  HR: 82 (11 Aug 2021 15:15) (82 - 92)  BP: 104/64 (11 Aug 2021 15:15) (104/64 - 118/71)  BP(mean): --  RR: 16 (11 Aug 2021 15:15) (16 - 18)  SpO2: 97% (11 Aug 2021 15:15) (96% - 98%)    Constitutional: NAD, well-developed, A+Ox3  Anicteric   Respiratory: CTABL, breathing comfortably  Cardiovascular: S1 and S2, RRR  Gastrointestinal: +BS, soft, non tender, non distended, no mass  Extremities: warm, well perfused, trace edema, wrappings noted, chronic skin changes noted  Psychiatric: Normal mood, normal affect  Neuro: moves all extremities, grossly intact  Skin: No rashes or lesions    LABS:                        13.0   12.59 )-----------( 273      ( 11 Aug 2021 09:10 )             37.4     08-11    143  |  105  |  26<H>  ----------------------------<  109<H>  2.5<LL>   |  32<H>  |  1.08    Ca    8.2<L>      11 Aug 2021 09:10  Phos  2.3     08-11  Mg     1.8     08-11    TPro  6.0  /  Alb  2.1<L>  /  TBili  1.7<H>  /  DBili  x   /  AST  18  /  ALT  17  /  AlkPhos  82  08-11      LIVER FUNCTIONS - ( 11 Aug 2021 09:10 )  Alb: 2.1 g/dL / Pro: 6.0 gm/dL / ALK PHOS: 82 U/L / ALT: 17 U/L / AST: 18 U/L / GGT: x             RADIOLOGY & ADDITIONAL STUDIES:

## 2021-08-11 NOTE — CHART NOTE - NSCHARTNOTEFT_GEN_A_CORE
Orthopedics    Joint aspiration yielded both CPPD and MSU crystals indicative of Gout and Pseudogout  GS negative, CUltures still pending.   Recommend Tx for gout/pseudogout at this time  Continue to monitor cultures for any growth  Orthopedics will monitor cultures but no further intervention planned at this point unless growth on cultures  WBAT  DVT ppx  Diet as tolerated

## 2021-08-11 NOTE — CONSULT NOTE ADULT - ASSESSMENT
80-year-old male with chronic diarrhea, now worsening.  40 pound weight loss over several years.  No prior endoscopic evaluation.  Rule out colitis/microscopic colitis, celiac disease, medication side effects, malignancy, and other conditions.  Stool testing was negative.  CRP and ESR are markedly elevated but this is in the setting of acute gout and is not necessarily related to any GI pathology.    Recommendations:  -Recommend colonoscopy but the patient declines colonoscopy  -Check labs for celiac disease  -Imodium or Lomotil as needed for diarrhea  -Aggressive potassium repletion  -If no improvement can check labs/urine to evaluate for neuroendocrine tumor but this seems unlikely and would not typically be the recommended first step however he is refusing colonoscopy. 80-year-old male with chronic diarrhea, now worsening.  40 pound weight loss over several years.  No prior endoscopic evaluation.  Rule out colitis/microscopic colitis, celiac disease, medication side effects, malignancy, and other conditions.  Stool testing was negative.  CRP and ESR are markedly elevated but this is in the setting of acute gout and is not necessarily related to any GI pathology.    Recommendations:  -Recommend colonoscopy but the patient declines colonoscopy  -Check labs for celiac disease  -Imodium or Lomotil as needed for diarrhea  -Aggressive potassium repletion  -consider CT scan a/p with PO and IV contrast to rule out colitis/malignancy instead of colonoscopy  -If no improvement can check labs/urine to evaluate for neuroendocrine tumor but this seems unlikely and would not typically be the recommended first step however he is refusing colonoscopy.

## 2021-08-11 NOTE — CONSULT NOTE ADULT - SUBJECTIVE AND OBJECTIVE BOX
Patient is a 80y old  Male who presents with a chief complaint of LE edema (10 Aug 2021 16:55)    HPI:  79 yo male with PMH of COPD, HTN, HLD, hemochromatosis admitted on  for evaluation of right knee pain; afebrile, patient has difficulty ambulating secondary to edema; has had an aspirate of right knee, found to have crystals in the joint. No fever chills or any other complaints.         PMH: as above  PSH: as above  Meds: per reconciliation sheet, noted below  MEDICATIONS  (STANDING):  atorvastatin 10 milliGRAM(s) Oral at bedtime  budesonide 160 MICROgram(s)/formoterol 4.5 MICROgram(s) Inhaler 2 Puff(s) Inhalation two times a day  cefTRIAXone Injectable.      cefTRIAXone Injectable. 1000 milliGRAM(s) IV Push every 24 hours  diltiazem    milliGRAM(s) Oral daily  enoxaparin Injectable 40 milliGRAM(s) SubCutaneous daily  folic acid 1 milliGRAM(s) Oral daily  lisinopril 10 milliGRAM(s) Oral daily  metoprolol tartrate 25 milliGRAM(s) Oral two times a day  potassium chloride    Tablet ER 20 milliEquivalent(s) Oral daily  potassium chloride    Tablet ER 40 milliEquivalent(s) Oral every 4 hours  potassium phosphate / sodium phosphate Powder (PHOS-NaK) 1 Packet(s) Oral three times a day  predniSONE   Tablet 40 milliGRAM(s) Oral daily  tiotropium 18 MICROgram(s) Capsule 1 Capsule(s) Inhalation daily    MEDICATIONS  (PRN):  acetaminophen   Tablet .. 650 milliGRAM(s) Oral every 6 hours PRN Temp greater or equal to 38.5C (101.3F), Mild Pain (1 - 3)  aluminum hydroxide/magnesium hydroxide/simethicone Suspension 30 milliLiter(s) Oral every 4 hours PRN Dyspepsia  melatonin 3 milliGRAM(s) Oral at bedtime PRN Insomnia  ondansetron Injectable 4 milliGRAM(s) IV Push every 8 hours PRN Nausea and/or Vomiting    Allergies    No Known Allergies    Intolerances      Social: no smoking, no alcohol, no illegal drugs; no recent travel, no exposure to TB  FAMILY HISTORY:  FH: breast cancer (Mother)       no history of premature cardiovascular disease in first degree relatives  ROS: the patient denies fever, no chills, no HA, no dizziness, no sore throat, no blurry vision, no CP, no palpitations, no SOB, no cough, no abdominal pain, no diarrhea, no N/V, no dysuria,  no claudication, no rash, no joint aches, no rectal pain or bleeding, no night sweats  All other systems reviewed and are negative    Vital Signs Last 24 Hrs  T(C): 36.5 (11 Aug 2021 08:36), Max: 37.9 (10 Aug 2021 17:06)  T(F): 97.7 (11 Aug 2021 08:36), Max: 100.3 (10 Aug 2021 17:06)  HR: 87 (11 Aug 2021 08:36) (87 - 92)  BP: 118/71 (11 Aug 2021 08:36) (108/71 - 125/73)  BP(mean): 85 (10 Aug 2021 17:06) (85 - 85)  RR: 18 (11 Aug 2021 08:36) (16 - 18)  SpO2: 98% (11 Aug 2021 08:36) (96% - 98%)  Daily     Daily Weight in k.1 (11 Aug 2021 06:34)    PE:    Constitutional: frail looking  HEENT: NC/AT, EOMI, PERRLA, conjunctivae clear; ears and nose atraumatic; pharynx clear  Neck: supple; thyroid not palpable  Back: no tenderness  Respiratory: respiratory effort normal; clear to auscultation  Cardiovascular: S1S2 regular, no murmurs  Abdomen: soft, not tender, not distended, positive BS; no liver or spleen organomegaly  Genitourinary: no suprapubic tenderness  Musculoskeletal: no muscle tenderness, right knee with dressing in place  Neurological/ Psychiatric: AxOx3, judgement and insight normal;  moving all extremities  Skin: no rashes; no palpable lesions    Labs: all available labs reviewed                        13.0   12.59 )-----------( 273      ( 11 Aug 2021 09:10 )             37.4     08-11    143  |  105  |  26<H>  ----------------------------<  109<H>  2.5<LL>   |  32<H>  |  1.08    Ca    8.2<L>      11 Aug 2021 09:10  Phos  2.3       Mg     1.8         TPro  6.0  /  Alb  2.1<L>  /  TBili  1.7<H>  /  DBili  x   /  AST  18  /  ALT  17  /  AlkPhos  82       LIVER FUNCTIONS - ( 11 Aug 2021 09:10 )  Alb: 2.1 g/dL / Pro: 6.0 gm/dL / ALK PHOS: 82 U/L / ALT: 17 U/L / AST: 18 U/L / GGT: x             Crystals, Synovial Fluid (08.10.21 @ 17:36)    Synovial Crystals Clarity: Turbid    Synovial Crystals Tube: Red Top Tube    Synovial Crystals Color: Yellow    Synovial Crystals Id: Crystals Present Many extra and intra cellular Calcium Pyrophosphate dihydrate crystals  and many intra and extra Monosodium Urate crystals seen under compensated  polarized light microscopy    Cell Count, Body Fluid (08.10.21 @ 17:36)    Monocyte/Macrophage Count - Body Fluid: 1 %    Fluid Segmented Granulocytes: 95 %    Fluid Type: Synovial fluid    Body Fluid Appearance: Cloudy    BF Lymphocytes: 4 %    Tube Type: Lavender    Color - Body Fluid: Brown    Total Nucleated Cell Count, Body Fluid: 8333: Peritoneal/Pleural/ Pericardial  Body Fluid Types            Total Nucleated cells: <500/uL            Neutrophils: <25%          Synovial Body Fluid Type           Total Nucleated cells: <150/uL           Neutrophils: <25%           Lymphocytes: <75%           Moncytes/Macrophages: </=70%  Please note reference range updated effective 2019 /uL    Total RBC Count: 37165 /uL                    Culture Results:   GI PCR Results: NOT detected  *******Please Note:*******  GI panel PCR evaluates for:  Campylobacter, Plesiomonas shigelloides, Salmonella,  Vibrio, Yersinia enterocolitica, Enteroaggregative  Escherichia coli (EAEC), Enteropathogenic E.coli (EPEC),  Enterotoxigenic E. coli (ETEC) lt/st, Shiga-like  toxin-producing E. coli (STEC) stx1/stx2,  Shigella/ Enteroinvasive E. coli (EIEC), Cryptosporidium,  Cyclospora cayetanensis, Entamoeba histolytica,  Giardia lamblia, Adenovirus F 40/41, Astrovirus,  Norovirus GI/GII, Rotavirus A, Sapovirus (08-10 @ 21:28)    Radiology: all available radiological tests reviewed    Advanced directives addressed: full resuscitation

## 2021-08-12 LAB
ANION GAP SERPL CALC-SCNC: 6 MMOL/L — SIGNIFICANT CHANGE UP (ref 5–17)
BUN SERPL-MCNC: 33 MG/DL — HIGH (ref 7–23)
CALCIUM SERPL-MCNC: 8.7 MG/DL — SIGNIFICANT CHANGE UP (ref 8.5–10.1)
CHLORIDE SERPL-SCNC: 105 MMOL/L — SIGNIFICANT CHANGE UP (ref 96–108)
CO2 SERPL-SCNC: 30 MMOL/L — SIGNIFICANT CHANGE UP (ref 22–31)
CREAT SERPL-MCNC: 1.13 MG/DL — SIGNIFICANT CHANGE UP (ref 0.5–1.3)
GLUCOSE SERPL-MCNC: 199 MG/DL — HIGH (ref 70–99)
HCT VFR BLD CALC: 40 % — SIGNIFICANT CHANGE UP (ref 39–50)
HGB BLD-MCNC: 13.7 G/DL — SIGNIFICANT CHANGE UP (ref 13–17)
MAGNESIUM SERPL-MCNC: 1.9 MG/DL — SIGNIFICANT CHANGE UP (ref 1.6–2.6)
MCHC RBC-ENTMCNC: 33.9 PG — SIGNIFICANT CHANGE UP (ref 27–34)
MCHC RBC-ENTMCNC: 34.3 GM/DL — SIGNIFICANT CHANGE UP (ref 32–36)
MCV RBC AUTO: 99 FL — SIGNIFICANT CHANGE UP (ref 80–100)
PHOSPHATE SERPL-MCNC: 2.6 MG/DL — SIGNIFICANT CHANGE UP (ref 2.5–4.5)
PLATELET # BLD AUTO: 279 K/UL — SIGNIFICANT CHANGE UP (ref 150–400)
POTASSIUM SERPL-MCNC: 3.1 MMOL/L — LOW (ref 3.5–5.3)
POTASSIUM SERPL-SCNC: 3.1 MMOL/L — LOW (ref 3.5–5.3)
RBC # BLD: 4.04 M/UL — LOW (ref 4.2–5.8)
RBC # FLD: 13 % — SIGNIFICANT CHANGE UP (ref 10.3–14.5)
SODIUM SERPL-SCNC: 141 MMOL/L — SIGNIFICANT CHANGE UP (ref 135–145)
WBC # BLD: 14.75 K/UL — HIGH (ref 3.8–10.5)
WBC # FLD AUTO: 14.75 K/UL — HIGH (ref 3.8–10.5)

## 2021-08-12 PROCEDURE — 99232 SBSQ HOSP IP/OBS MODERATE 35: CPT

## 2021-08-12 RX ORDER — COLCHICINE 0.6 MG
0.6 TABLET ORAL EVERY 12 HOURS
Refills: 0 | Status: DISCONTINUED | OUTPATIENT
Start: 2021-08-12 | End: 2021-08-17

## 2021-08-12 RX ORDER — POTASSIUM CHLORIDE 20 MEQ
40 PACKET (EA) ORAL EVERY 4 HOURS
Refills: 0 | Status: COMPLETED | OUTPATIENT
Start: 2021-08-12 | End: 2021-08-12

## 2021-08-12 RX ADMIN — Medication 2 MILLIGRAM(S): at 21:52

## 2021-08-12 RX ADMIN — Medication 40 MILLIEQUIVALENT(S): at 16:14

## 2021-08-12 RX ADMIN — TIOTROPIUM BROMIDE 1 CAPSULE(S): 18 CAPSULE ORAL; RESPIRATORY (INHALATION) at 08:52

## 2021-08-12 RX ADMIN — Medication 20 MILLIEQUIVALENT(S): at 09:37

## 2021-08-12 RX ADMIN — Medication 0.6 MILLIGRAM(S): at 16:14

## 2021-08-12 RX ADMIN — BUDESONIDE AND FORMOTEROL FUMARATE DIHYDRATE 2 PUFF(S): 160; 4.5 AEROSOL RESPIRATORY (INHALATION) at 21:00

## 2021-08-12 RX ADMIN — ATORVASTATIN CALCIUM 10 MILLIGRAM(S): 80 TABLET, FILM COATED ORAL at 21:52

## 2021-08-12 RX ADMIN — Medication 0.6 MILLIGRAM(S): at 21:52

## 2021-08-12 RX ADMIN — Medication 1 PACKET(S): at 05:36

## 2021-08-12 RX ADMIN — Medication 40 MILLIEQUIVALENT(S): at 12:48

## 2021-08-12 RX ADMIN — CEFTRIAXONE 1000 MILLIGRAM(S): 500 INJECTION, POWDER, FOR SOLUTION INTRAMUSCULAR; INTRAVENOUS at 21:52

## 2021-08-12 RX ADMIN — Medication 2 MILLIGRAM(S): at 16:14

## 2021-08-12 RX ADMIN — Medication 1 MILLIGRAM(S): at 09:37

## 2021-08-12 RX ADMIN — ENOXAPARIN SODIUM 40 MILLIGRAM(S): 100 INJECTION SUBCUTANEOUS at 09:37

## 2021-08-12 RX ADMIN — Medication 25 MILLIGRAM(S): at 09:37

## 2021-08-12 RX ADMIN — Medication 2 MILLIGRAM(S): at 05:36

## 2021-08-12 RX ADMIN — Medication 240 MILLIGRAM(S): at 09:37

## 2021-08-12 RX ADMIN — Medication 40 MILLIGRAM(S): at 05:36

## 2021-08-12 RX ADMIN — LISINOPRIL 10 MILLIGRAM(S): 2.5 TABLET ORAL at 09:37

## 2021-08-12 RX ADMIN — Medication 25 MILLIGRAM(S): at 21:53

## 2021-08-12 NOTE — CONSULT NOTE ADULT - SUBJECTIVE AND OBJECTIVE BOX
Date of service: 8/12/21  HPI: 80y year old Male seen at bedside with a chief complaint of  painful thickened, elongated and dystrophic toenails digits 1-5 bilaterally and preventative foot examination. Patient is medically managed  by Medicine/Hospitalists.  Patient denies any history o f trauma to both feet.  Patient has no other pedal complaints at this time.  Patient is experiencing pain while standing, walking and in shoe gear.       Patient admits to  (-) Fevers, (-) Chills, (-) Nausea, (-) Vomiting, (-) Shortness of Breath (-) calf pain (-) chest pain       PMH:   COPD, moderate  Hypertension  Hyperlipidemia  History of hemochromatosis      PSH:    Allergies:  No Known Allergies      Labs:                        13.0   12.59 )-----------( 273      ( 11 Aug 2021 09:10 )             37.4       WBC Trend  12.59<H> Date (08-11 @ 09:10)  11.25<H> Date (08-10 @ 08:17)  12.40<H> Date (08-09 @ 14:46)      Chem  08-11    143  |  105  |  26<H>  ----------------------------<  109<H>  2.5<LL>   |  32<H>  |  1.08    Ca    8.2<L>      11 Aug 2021 09:10  Phos  2.3     08-11  Mg     1.8     08-11    TPro  6.0  /  Alb  2.1<L>  /  TBili  1.7<H>  /  DBili  x   /  AST  18  /  ALT  17  /  AlkPhos  82  08-11        Vitals:  T(F): 97.4 (08-12-21 @ 00:25), Max: 98.1 (08-11-21 @ 15:15)  HR: 75 (08-12-21 @ 00:25) (72 - 87)  BP: 110/68 (08-12-21 @ 00:25) (98/67 - 118/71)  RR: 18 (08-12-21 @ 00:25) (16 - 18)  SpO2: 100% (08-12-21 @ 00:25) (97% - 100%)  Wt(kg): --    Physical Examination:  Constitutional: Alert, oriented x3, in NAD.  Focused LE exam:  Integument:  Skin warm, dry and supple bilateral.  No open lesions or inter-digital macerations, and no clinical signs of acute infection noted bilaterally.   Toenails 1-5 Right and Left feet thickened, elongated, discolored, and dystrophic with subungual debris. There is pain upon palpation of all fungal and ingrowing nails 1-5 bilaterally.   Vascular: Tempreature gradient is warm to warm b/l. Palpable pulses, Dorsalis Pedis and Posterior Tibial pulses -/4 b/l.  Capillary re-fill time less than 3 seconds digits 1-5 bilateral.   Neuro: Protective sensation intact to the level of the digits 1-5 bilaterally.  MSK: Manual muscle strength testing  5/5 all major muscle groups foot/ankle bilaterally. ROM of all foot/ankle joints is WNL bilaterally. No structural abnormality, bilaterally

## 2021-08-12 NOTE — CONSULT NOTE ADULT - ATTENDING COMMENTS
Agree with assessment and plan as documented by the resident. Onychomycosis B/L. Pt was treated by debridement of toe nails x 10.

## 2021-08-12 NOTE — CONSULT NOTE ADULT - ASSESSMENT
Assessment:   1.  Onychomycosis digits 1-5 B/L  2. Pain in Left foot   3. Pain in Right Foot  4. Difficulty with ambulation    P: Discussed diagnosis and treatment with patient  - Patient was seen and examined by podiatry team  - Discussed all the potential treatment plans with the patient  - Educated Pt about the proper foot hygiene, to prevent the recurrence of the fungal foot infection when cleared.  - Educated Pt about importance of daily foot examinations and proper shoe gear  - Pt demonstrated verbal understanding  - Aseptic mechanical debridement of all fungal toe nails 1-5 bilateral, using sterile nail nippers  - Pt tolerated the procedure well, without any complication  - Will order clotrimazole cream to be applied daily per nursing, appreciated   - Please re-consult as needed.

## 2021-08-13 LAB
ANION GAP SERPL CALC-SCNC: 3 MMOL/L — LOW (ref 5–17)
BUN SERPL-MCNC: 39 MG/DL — HIGH (ref 7–23)
CALCIUM SERPL-MCNC: 8.7 MG/DL — SIGNIFICANT CHANGE UP (ref 8.5–10.1)
CHLORIDE SERPL-SCNC: 106 MMOL/L — SIGNIFICANT CHANGE UP (ref 96–108)
CO2 SERPL-SCNC: 30 MMOL/L — SIGNIFICANT CHANGE UP (ref 22–31)
CREAT SERPL-MCNC: 1.08 MG/DL — SIGNIFICANT CHANGE UP (ref 0.5–1.3)
GLUCOSE SERPL-MCNC: 123 MG/DL — HIGH (ref 70–99)
HCT VFR BLD CALC: 38.2 % — LOW (ref 39–50)
HGB BLD-MCNC: 12.9 G/DL — LOW (ref 13–17)
MAGNESIUM SERPL-MCNC: 2.2 MG/DL — SIGNIFICANT CHANGE UP (ref 1.6–2.6)
MCHC RBC-ENTMCNC: 33.6 PG — SIGNIFICANT CHANGE UP (ref 27–34)
MCHC RBC-ENTMCNC: 33.8 GM/DL — SIGNIFICANT CHANGE UP (ref 32–36)
MCV RBC AUTO: 99.5 FL — SIGNIFICANT CHANGE UP (ref 80–100)
PHOSPHATE SERPL-MCNC: 2.9 MG/DL — SIGNIFICANT CHANGE UP (ref 2.5–4.5)
PLATELET # BLD AUTO: 343 K/UL — SIGNIFICANT CHANGE UP (ref 150–400)
POTASSIUM SERPL-MCNC: 3.6 MMOL/L — SIGNIFICANT CHANGE UP (ref 3.5–5.3)
POTASSIUM SERPL-SCNC: 3.6 MMOL/L — SIGNIFICANT CHANGE UP (ref 3.5–5.3)
RBC # BLD: 3.84 M/UL — LOW (ref 4.2–5.8)
RBC # FLD: 13 % — SIGNIFICANT CHANGE UP (ref 10.3–14.5)
SODIUM SERPL-SCNC: 139 MMOL/L — SIGNIFICANT CHANGE UP (ref 135–145)
WBC # BLD: 13.5 K/UL — HIGH (ref 3.8–10.5)
WBC # FLD AUTO: 13.5 K/UL — HIGH (ref 3.8–10.5)

## 2021-08-13 PROCEDURE — 99232 SBSQ HOSP IP/OBS MODERATE 35: CPT

## 2021-08-13 PROCEDURE — 74177 CT ABD & PELVIS W/CONTRAST: CPT | Mod: 26

## 2021-08-13 RX ORDER — POTASSIUM CHLORIDE 20 MEQ
40 PACKET (EA) ORAL ONCE
Refills: 0 | Status: COMPLETED | OUTPATIENT
Start: 2021-08-13 | End: 2021-08-13

## 2021-08-13 RX ORDER — SODIUM CHLORIDE 9 MG/ML
1000 INJECTION INTRAMUSCULAR; INTRAVENOUS; SUBCUTANEOUS
Refills: 0 | Status: DISCONTINUED | OUTPATIENT
Start: 2021-08-13 | End: 2021-08-23

## 2021-08-13 RX ORDER — PANTOPRAZOLE SODIUM 20 MG/1
40 TABLET, DELAYED RELEASE ORAL DAILY
Refills: 0 | Status: DISCONTINUED | OUTPATIENT
Start: 2021-08-13 | End: 2021-08-18

## 2021-08-13 RX ADMIN — Medication 2 MILLIGRAM(S): at 05:44

## 2021-08-13 RX ADMIN — ENOXAPARIN SODIUM 40 MILLIGRAM(S): 100 INJECTION SUBCUTANEOUS at 10:01

## 2021-08-13 RX ADMIN — PANTOPRAZOLE SODIUM 40 MILLIGRAM(S): 20 TABLET, DELAYED RELEASE ORAL at 10:00

## 2021-08-13 RX ADMIN — Medication 0.6 MILLIGRAM(S): at 10:00

## 2021-08-13 RX ADMIN — SODIUM CHLORIDE 50 MILLILITER(S): 9 INJECTION INTRAMUSCULAR; INTRAVENOUS; SUBCUTANEOUS at 17:04

## 2021-08-13 RX ADMIN — TIOTROPIUM BROMIDE 1 CAPSULE(S): 18 CAPSULE ORAL; RESPIRATORY (INHALATION) at 08:43

## 2021-08-13 RX ADMIN — Medication 40 MILLIEQUIVALENT(S): at 12:41

## 2021-08-13 RX ADMIN — BUDESONIDE AND FORMOTEROL FUMARATE DIHYDRATE 2 PUFF(S): 160; 4.5 AEROSOL RESPIRATORY (INHALATION) at 19:51

## 2021-08-13 RX ADMIN — Medication 40 MILLIGRAM(S): at 05:44

## 2021-08-13 RX ADMIN — CEFTRIAXONE 1000 MILLIGRAM(S): 500 INJECTION, POWDER, FOR SOLUTION INTRAMUSCULAR; INTRAVENOUS at 21:10

## 2021-08-13 RX ADMIN — Medication 0.6 MILLIGRAM(S): at 21:09

## 2021-08-13 RX ADMIN — BUDESONIDE AND FORMOTEROL FUMARATE DIHYDRATE 2 PUFF(S): 160; 4.5 AEROSOL RESPIRATORY (INHALATION) at 08:43

## 2021-08-13 RX ADMIN — Medication 25 MILLIGRAM(S): at 09:59

## 2021-08-13 RX ADMIN — Medication 1 MILLIGRAM(S): at 10:00

## 2021-08-13 RX ADMIN — Medication 20 MILLIEQUIVALENT(S): at 10:00

## 2021-08-13 RX ADMIN — Medication 2 MILLIGRAM(S): at 13:55

## 2021-08-13 RX ADMIN — ATORVASTATIN CALCIUM 10 MILLIGRAM(S): 80 TABLET, FILM COATED ORAL at 21:08

## 2021-08-13 RX ADMIN — Medication 240 MILLIGRAM(S): at 10:00

## 2021-08-13 RX ADMIN — LISINOPRIL 10 MILLIGRAM(S): 2.5 TABLET ORAL at 10:00

## 2021-08-13 RX ADMIN — Medication 25 MILLIGRAM(S): at 21:08

## 2021-08-14 LAB
ANION GAP SERPL CALC-SCNC: 5 MMOL/L — SIGNIFICANT CHANGE UP (ref 5–17)
BUN SERPL-MCNC: 38 MG/DL — HIGH (ref 7–23)
CALCIUM SERPL-MCNC: 8.3 MG/DL — LOW (ref 8.5–10.1)
CHLORIDE SERPL-SCNC: 107 MMOL/L — SIGNIFICANT CHANGE UP (ref 96–108)
CO2 SERPL-SCNC: 28 MMOL/L — SIGNIFICANT CHANGE UP (ref 22–31)
CREAT SERPL-MCNC: 1.04 MG/DL — SIGNIFICANT CHANGE UP (ref 0.5–1.3)
CULTURE RESULTS: SIGNIFICANT CHANGE UP
GLUCOSE SERPL-MCNC: 123 MG/DL — HIGH (ref 70–99)
HCT VFR BLD CALC: 36.9 % — LOW (ref 39–50)
HGB BLD-MCNC: 12.5 G/DL — LOW (ref 13–17)
MAGNESIUM SERPL-MCNC: 2 MG/DL — SIGNIFICANT CHANGE UP (ref 1.6–2.6)
MCHC RBC-ENTMCNC: 33.4 PG — SIGNIFICANT CHANGE UP (ref 27–34)
MCHC RBC-ENTMCNC: 33.9 GM/DL — SIGNIFICANT CHANGE UP (ref 32–36)
MCV RBC AUTO: 98.7 FL — SIGNIFICANT CHANGE UP (ref 80–100)
PHOSPHATE SERPL-MCNC: 2.8 MG/DL — SIGNIFICANT CHANGE UP (ref 2.5–4.5)
PLATELET # BLD AUTO: 342 K/UL — SIGNIFICANT CHANGE UP (ref 150–400)
POTASSIUM SERPL-MCNC: 3.9 MMOL/L — SIGNIFICANT CHANGE UP (ref 3.5–5.3)
POTASSIUM SERPL-SCNC: 3.9 MMOL/L — SIGNIFICANT CHANGE UP (ref 3.5–5.3)
RBC # BLD: 3.74 M/UL — LOW (ref 4.2–5.8)
RBC # FLD: 12.9 % — SIGNIFICANT CHANGE UP (ref 10.3–14.5)
SARS-COV-2 RNA SPEC QL NAA+PROBE: SIGNIFICANT CHANGE UP
SODIUM SERPL-SCNC: 140 MMOL/L — SIGNIFICANT CHANGE UP (ref 135–145)
SPECIMEN SOURCE: SIGNIFICANT CHANGE UP
WBC # BLD: 11.01 K/UL — HIGH (ref 3.8–10.5)
WBC # FLD AUTO: 11.01 K/UL — HIGH (ref 3.8–10.5)

## 2021-08-14 PROCEDURE — 99232 SBSQ HOSP IP/OBS MODERATE 35: CPT

## 2021-08-14 RX ORDER — LOPERAMIDE HCL 2 MG
2 TABLET ORAL
Refills: 0 | Status: DISCONTINUED | OUTPATIENT
Start: 2021-08-14 | End: 2021-08-16

## 2021-08-14 RX ORDER — LACTOBACILLUS ACIDOPHILUS 100MM CELL
1 CAPSULE ORAL
Refills: 0 | Status: DISCONTINUED | OUTPATIENT
Start: 2021-08-14 | End: 2021-08-23

## 2021-08-14 RX ADMIN — Medication 0.6 MILLIGRAM(S): at 09:34

## 2021-08-14 RX ADMIN — Medication 2 MILLIGRAM(S): at 21:48

## 2021-08-14 RX ADMIN — Medication 240 MILLIGRAM(S): at 09:34

## 2021-08-14 RX ADMIN — Medication 1 MILLIGRAM(S): at 09:34

## 2021-08-14 RX ADMIN — Medication 40 MILLIGRAM(S): at 06:21

## 2021-08-14 RX ADMIN — PANTOPRAZOLE SODIUM 40 MILLIGRAM(S): 20 TABLET, DELAYED RELEASE ORAL at 09:35

## 2021-08-14 RX ADMIN — Medication 20 MILLIEQUIVALENT(S): at 09:35

## 2021-08-14 RX ADMIN — Medication 25 MILLIGRAM(S): at 21:48

## 2021-08-14 RX ADMIN — CEFTRIAXONE 1000 MILLIGRAM(S): 500 INJECTION, POWDER, FOR SOLUTION INTRAMUSCULAR; INTRAVENOUS at 21:48

## 2021-08-14 RX ADMIN — Medication 0.6 MILLIGRAM(S): at 21:48

## 2021-08-14 RX ADMIN — ATORVASTATIN CALCIUM 10 MILLIGRAM(S): 80 TABLET, FILM COATED ORAL at 21:48

## 2021-08-14 RX ADMIN — Medication 2 MILLIGRAM(S): at 06:21

## 2021-08-14 RX ADMIN — Medication 25 MILLIGRAM(S): at 09:35

## 2021-08-14 RX ADMIN — Medication 1 TABLET(S): at 21:48

## 2021-08-14 RX ADMIN — LISINOPRIL 10 MILLIGRAM(S): 2.5 TABLET ORAL at 09:35

## 2021-08-14 RX ADMIN — TIOTROPIUM BROMIDE 1 CAPSULE(S): 18 CAPSULE ORAL; RESPIRATORY (INHALATION) at 09:05

## 2021-08-14 RX ADMIN — ENOXAPARIN SODIUM 40 MILLIGRAM(S): 100 INJECTION SUBCUTANEOUS at 09:35

## 2021-08-14 RX ADMIN — BUDESONIDE AND FORMOTEROL FUMARATE DIHYDRATE 2 PUFF(S): 160; 4.5 AEROSOL RESPIRATORY (INHALATION) at 09:05

## 2021-08-14 RX ADMIN — BUDESONIDE AND FORMOTEROL FUMARATE DIHYDRATE 2 PUFF(S): 160; 4.5 AEROSOL RESPIRATORY (INHALATION) at 20:34

## 2021-08-15 LAB
ANION GAP SERPL CALC-SCNC: 3 MMOL/L — LOW (ref 5–17)
BUN SERPL-MCNC: 36 MG/DL — HIGH (ref 7–23)
CALCIUM SERPL-MCNC: 8.4 MG/DL — LOW (ref 8.5–10.1)
CHLORIDE SERPL-SCNC: 108 MMOL/L — SIGNIFICANT CHANGE UP (ref 96–108)
CO2 SERPL-SCNC: 31 MMOL/L — SIGNIFICANT CHANGE UP (ref 22–31)
CREAT SERPL-MCNC: 1.02 MG/DL — SIGNIFICANT CHANGE UP (ref 0.5–1.3)
GLUCOSE SERPL-MCNC: 133 MG/DL — HIGH (ref 70–99)
HCT VFR BLD CALC: 38.4 % — LOW (ref 39–50)
HGB BLD-MCNC: 13 G/DL — SIGNIFICANT CHANGE UP (ref 13–17)
MAGNESIUM SERPL-MCNC: 2 MG/DL — SIGNIFICANT CHANGE UP (ref 1.6–2.6)
MCHC RBC-ENTMCNC: 33.9 GM/DL — SIGNIFICANT CHANGE UP (ref 32–36)
MCHC RBC-ENTMCNC: 34 PG — SIGNIFICANT CHANGE UP (ref 27–34)
MCV RBC AUTO: 100.5 FL — HIGH (ref 80–100)
PHOSPHATE SERPL-MCNC: 2.6 MG/DL — SIGNIFICANT CHANGE UP (ref 2.5–4.5)
PLATELET # BLD AUTO: 371 K/UL — SIGNIFICANT CHANGE UP (ref 150–400)
POTASSIUM SERPL-MCNC: 4.3 MMOL/L — SIGNIFICANT CHANGE UP (ref 3.5–5.3)
POTASSIUM SERPL-SCNC: 4.3 MMOL/L — SIGNIFICANT CHANGE UP (ref 3.5–5.3)
RBC # BLD: 3.82 M/UL — LOW (ref 4.2–5.8)
RBC # FLD: 12.9 % — SIGNIFICANT CHANGE UP (ref 10.3–14.5)
SODIUM SERPL-SCNC: 142 MMOL/L — SIGNIFICANT CHANGE UP (ref 135–145)
WBC # BLD: 11.57 K/UL — HIGH (ref 3.8–10.5)
WBC # FLD AUTO: 11.57 K/UL — HIGH (ref 3.8–10.5)

## 2021-08-15 PROCEDURE — 99232 SBSQ HOSP IP/OBS MODERATE 35: CPT

## 2021-08-15 RX ORDER — CEFUROXIME AXETIL 250 MG
250 TABLET ORAL EVERY 12 HOURS
Refills: 0 | Status: COMPLETED | OUTPATIENT
Start: 2021-08-15 | End: 2021-08-23

## 2021-08-15 RX ADMIN — Medication 2 MILLIGRAM(S): at 21:13

## 2021-08-15 RX ADMIN — LISINOPRIL 10 MILLIGRAM(S): 2.5 TABLET ORAL at 09:44

## 2021-08-15 RX ADMIN — Medication 0.6 MILLIGRAM(S): at 09:44

## 2021-08-15 RX ADMIN — ATORVASTATIN CALCIUM 10 MILLIGRAM(S): 80 TABLET, FILM COATED ORAL at 21:13

## 2021-08-15 RX ADMIN — Medication 1 TABLET(S): at 21:12

## 2021-08-15 RX ADMIN — Medication 240 MILLIGRAM(S): at 09:44

## 2021-08-15 RX ADMIN — TIOTROPIUM BROMIDE 1 CAPSULE(S): 18 CAPSULE ORAL; RESPIRATORY (INHALATION) at 08:39

## 2021-08-15 RX ADMIN — Medication 1 MILLIGRAM(S): at 09:44

## 2021-08-15 RX ADMIN — Medication 25 MILLIGRAM(S): at 09:43

## 2021-08-15 RX ADMIN — BUDESONIDE AND FORMOTEROL FUMARATE DIHYDRATE 2 PUFF(S): 160; 4.5 AEROSOL RESPIRATORY (INHALATION) at 20:17

## 2021-08-15 RX ADMIN — Medication 20 MILLIEQUIVALENT(S): at 09:43

## 2021-08-15 RX ADMIN — Medication 25 MILLIGRAM(S): at 21:13

## 2021-08-15 RX ADMIN — Medication 40 MILLIGRAM(S): at 05:44

## 2021-08-15 RX ADMIN — Medication 250 MILLIGRAM(S): at 21:13

## 2021-08-15 RX ADMIN — ENOXAPARIN SODIUM 40 MILLIGRAM(S): 100 INJECTION SUBCUTANEOUS at 09:43

## 2021-08-15 RX ADMIN — BUDESONIDE AND FORMOTEROL FUMARATE DIHYDRATE 2 PUFF(S): 160; 4.5 AEROSOL RESPIRATORY (INHALATION) at 08:39

## 2021-08-15 RX ADMIN — PANTOPRAZOLE SODIUM 40 MILLIGRAM(S): 20 TABLET, DELAYED RELEASE ORAL at 09:43

## 2021-08-15 RX ADMIN — Medication 1 TABLET(S): at 09:44

## 2021-08-15 RX ADMIN — Medication 0.6 MILLIGRAM(S): at 21:13

## 2021-08-15 RX ADMIN — Medication 2 MILLIGRAM(S): at 09:43

## 2021-08-16 PROCEDURE — 99232 SBSQ HOSP IP/OBS MODERATE 35: CPT

## 2021-08-16 RX ORDER — SOD SULF/SODIUM/NAHCO3/KCL/PEG
2000 SOLUTION, RECONSTITUTED, ORAL ORAL ONCE
Refills: 0 | Status: COMPLETED | OUTPATIENT
Start: 2021-08-16 | End: 2021-08-16

## 2021-08-16 RX ADMIN — BUDESONIDE AND FORMOTEROL FUMARATE DIHYDRATE 2 PUFF(S): 160; 4.5 AEROSOL RESPIRATORY (INHALATION) at 08:44

## 2021-08-16 RX ADMIN — Medication 1 TABLET(S): at 10:07

## 2021-08-16 RX ADMIN — Medication 40 MILLIGRAM(S): at 06:13

## 2021-08-16 RX ADMIN — PANTOPRAZOLE SODIUM 40 MILLIGRAM(S): 20 TABLET, DELAYED RELEASE ORAL at 10:07

## 2021-08-16 RX ADMIN — Medication 2000 MILLILITER(S): at 16:35

## 2021-08-16 RX ADMIN — TIOTROPIUM BROMIDE 1 CAPSULE(S): 18 CAPSULE ORAL; RESPIRATORY (INHALATION) at 08:45

## 2021-08-16 RX ADMIN — Medication 1 TABLET(S): at 21:26

## 2021-08-16 RX ADMIN — ENOXAPARIN SODIUM 40 MILLIGRAM(S): 100 INJECTION SUBCUTANEOUS at 10:07

## 2021-08-16 RX ADMIN — Medication 250 MILLIGRAM(S): at 10:07

## 2021-08-16 RX ADMIN — Medication 20 MILLIEQUIVALENT(S): at 10:07

## 2021-08-16 RX ADMIN — Medication 240 MILLIGRAM(S): at 10:07

## 2021-08-16 RX ADMIN — Medication 0.6 MILLIGRAM(S): at 10:07

## 2021-08-16 RX ADMIN — Medication 0.6 MILLIGRAM(S): at 21:26

## 2021-08-16 RX ADMIN — Medication 10 MILLIGRAM(S): at 16:35

## 2021-08-16 RX ADMIN — Medication 25 MILLIGRAM(S): at 21:26

## 2021-08-16 RX ADMIN — Medication 25 MILLIGRAM(S): at 10:07

## 2021-08-16 RX ADMIN — Medication 250 MILLIGRAM(S): at 21:26

## 2021-08-16 RX ADMIN — Medication 2 MILLIGRAM(S): at 10:07

## 2021-08-16 RX ADMIN — LISINOPRIL 10 MILLIGRAM(S): 2.5 TABLET ORAL at 10:07

## 2021-08-16 RX ADMIN — Medication 1 MILLIGRAM(S): at 10:07

## 2021-08-16 RX ADMIN — ATORVASTATIN CALCIUM 10 MILLIGRAM(S): 80 TABLET, FILM COATED ORAL at 21:26

## 2021-08-17 ENCOUNTER — RESULT REVIEW (OUTPATIENT)
Age: 81
End: 2021-08-17

## 2021-08-17 LAB
ANION GAP SERPL CALC-SCNC: 7 MMOL/L — SIGNIFICANT CHANGE UP (ref 5–17)
BUN SERPL-MCNC: 53 MG/DL — HIGH (ref 7–23)
CALCIUM SERPL-MCNC: 8.8 MG/DL — SIGNIFICANT CHANGE UP (ref 8.5–10.1)
CHLORIDE SERPL-SCNC: 115 MMOL/L — HIGH (ref 96–108)
CO2 SERPL-SCNC: 24 MMOL/L — SIGNIFICANT CHANGE UP (ref 22–31)
CREAT SERPL-MCNC: 1.56 MG/DL — HIGH (ref 0.5–1.3)
GLUCOSE SERPL-MCNC: 114 MG/DL — HIGH (ref 70–99)
HCT VFR BLD CALC: 43.1 % — SIGNIFICANT CHANGE UP (ref 39–50)
HGB BLD-MCNC: 14.3 G/DL — SIGNIFICANT CHANGE UP (ref 13–17)
MAGNESIUM SERPL-MCNC: 2.2 MG/DL — SIGNIFICANT CHANGE UP (ref 1.6–2.6)
MCHC RBC-ENTMCNC: 33.2 GM/DL — SIGNIFICANT CHANGE UP (ref 32–36)
MCHC RBC-ENTMCNC: 33.8 PG — SIGNIFICANT CHANGE UP (ref 27–34)
MCV RBC AUTO: 101.9 FL — HIGH (ref 80–100)
PHOSPHATE SERPL-MCNC: 4.9 MG/DL — HIGH (ref 2.5–4.5)
PLATELET # BLD AUTO: 399 K/UL — SIGNIFICANT CHANGE UP (ref 150–400)
POTASSIUM SERPL-MCNC: 3.5 MMOL/L — SIGNIFICANT CHANGE UP (ref 3.5–5.3)
POTASSIUM SERPL-SCNC: 3.5 MMOL/L — SIGNIFICANT CHANGE UP (ref 3.5–5.3)
RBC # BLD: 4.23 M/UL — SIGNIFICANT CHANGE UP (ref 4.2–5.8)
RBC # FLD: 13 % — SIGNIFICANT CHANGE UP (ref 10.3–14.5)
SODIUM SERPL-SCNC: 146 MMOL/L — HIGH (ref 135–145)
WBC # BLD: 15.72 K/UL — HIGH (ref 3.8–10.5)
WBC # FLD AUTO: 15.72 K/UL — HIGH (ref 3.8–10.5)

## 2021-08-17 PROCEDURE — 88305 TISSUE EXAM BY PATHOLOGIST: CPT | Mod: 26

## 2021-08-17 PROCEDURE — 99232 SBSQ HOSP IP/OBS MODERATE 35: CPT

## 2021-08-17 RX ADMIN — Medication 240 MILLIGRAM(S): at 09:08

## 2021-08-17 RX ADMIN — Medication 250 MILLIGRAM(S): at 09:08

## 2021-08-17 RX ADMIN — Medication 1 TABLET(S): at 20:38

## 2021-08-17 RX ADMIN — ATORVASTATIN CALCIUM 10 MILLIGRAM(S): 80 TABLET, FILM COATED ORAL at 20:37

## 2021-08-17 RX ADMIN — Medication 40 MILLIGRAM(S): at 05:53

## 2021-08-17 RX ADMIN — LISINOPRIL 10 MILLIGRAM(S): 2.5 TABLET ORAL at 09:08

## 2021-08-17 RX ADMIN — ENOXAPARIN SODIUM 40 MILLIGRAM(S): 100 INJECTION SUBCUTANEOUS at 17:48

## 2021-08-17 RX ADMIN — PANTOPRAZOLE SODIUM 40 MILLIGRAM(S): 20 TABLET, DELAYED RELEASE ORAL at 09:08

## 2021-08-17 RX ADMIN — Medication 25 MILLIGRAM(S): at 09:08

## 2021-08-17 RX ADMIN — BUDESONIDE AND FORMOTEROL FUMARATE DIHYDRATE 2 PUFF(S): 160; 4.5 AEROSOL RESPIRATORY (INHALATION) at 08:35

## 2021-08-17 RX ADMIN — Medication 250 MILLIGRAM(S): at 20:38

## 2021-08-17 RX ADMIN — Medication 20 MILLIEQUIVALENT(S): at 09:08

## 2021-08-17 RX ADMIN — TIOTROPIUM BROMIDE 1 CAPSULE(S): 18 CAPSULE ORAL; RESPIRATORY (INHALATION) at 08:33

## 2021-08-17 NOTE — PROVIDER CONTACT NOTE (OTHER) - SITUATION
DR. YONIS CORTEZ, SPOKE WITH BETSEY FROM MD'S OFFICE. SHE WILL INFORM PCP, PATIENT IS ADMITTED TO . PLEASE FAX DISCHARGE PROVIDER NOTE AND SUMMARY -[phone]
Dr. Herrmann already reached out to ortho res.  spoke to Dr. Douglas regarding consult.
spoke to Moustapha at service regarding gastro consult
Spoke to Podiatry resident Lizet. MD will see the patient.

## 2021-08-18 LAB
ANION GAP SERPL CALC-SCNC: 6 MMOL/L — SIGNIFICANT CHANGE UP (ref 5–17)
BUN SERPL-MCNC: 42 MG/DL — HIGH (ref 7–23)
CALCIUM SERPL-MCNC: 8 MG/DL — LOW (ref 8.5–10.1)
CELIAC DISEASE INTERPRETATION: SIGNIFICANT CHANGE UP
CELIAC GENE PAIRS PRESENT: YES — SIGNIFICANT CHANGE UP
CHLORIDE SERPL-SCNC: 113 MMOL/L — HIGH (ref 96–108)
CO2 SERPL-SCNC: 27 MMOL/L — SIGNIFICANT CHANGE UP (ref 22–31)
CREAT SERPL-MCNC: 1.12 MG/DL — SIGNIFICANT CHANGE UP (ref 0.5–1.3)
DQ ALPHA 1: SIGNIFICANT CHANGE UP
DQ BETA 1: SIGNIFICANT CHANGE UP
GLUCOSE SERPL-MCNC: 113 MG/DL — HIGH (ref 70–99)
IMMUNOGLOBULIN A (IGA), S: 467 MG/DL — HIGH (ref 61–356)
POTASSIUM SERPL-MCNC: 3.2 MMOL/L — LOW (ref 3.5–5.3)
POTASSIUM SERPL-SCNC: 3.2 MMOL/L — LOW (ref 3.5–5.3)
SODIUM SERPL-SCNC: 146 MMOL/L — HIGH (ref 135–145)

## 2021-08-18 PROCEDURE — 99232 SBSQ HOSP IP/OBS MODERATE 35: CPT

## 2021-08-18 RX ORDER — FUROSEMIDE 40 MG
40 TABLET ORAL DAILY
Refills: 0 | Status: DISCONTINUED | OUTPATIENT
Start: 2021-08-18 | End: 2021-08-23

## 2021-08-18 RX ORDER — POTASSIUM CHLORIDE 20 MEQ
40 PACKET (EA) ORAL ONCE
Refills: 0 | Status: COMPLETED | OUTPATIENT
Start: 2021-08-18 | End: 2021-08-18

## 2021-08-18 RX ADMIN — ENOXAPARIN SODIUM 40 MILLIGRAM(S): 100 INJECTION SUBCUTANEOUS at 11:03

## 2021-08-18 RX ADMIN — ATORVASTATIN CALCIUM 10 MILLIGRAM(S): 80 TABLET, FILM COATED ORAL at 20:54

## 2021-08-18 RX ADMIN — Medication 25 MILLIGRAM(S): at 20:54

## 2021-08-18 RX ADMIN — BUDESONIDE AND FORMOTEROL FUMARATE DIHYDRATE 2 PUFF(S): 160; 4.5 AEROSOL RESPIRATORY (INHALATION) at 21:30

## 2021-08-18 RX ADMIN — TIOTROPIUM BROMIDE 1 CAPSULE(S): 18 CAPSULE ORAL; RESPIRATORY (INHALATION) at 08:47

## 2021-08-18 RX ADMIN — Medication 20 MILLIEQUIVALENT(S): at 11:02

## 2021-08-18 RX ADMIN — Medication 40 MILLIEQUIVALENT(S): at 11:03

## 2021-08-18 RX ADMIN — Medication 250 MILLIGRAM(S): at 20:54

## 2021-08-18 RX ADMIN — BUDESONIDE AND FORMOTEROL FUMARATE DIHYDRATE 2 PUFF(S): 160; 4.5 AEROSOL RESPIRATORY (INHALATION) at 08:49

## 2021-08-18 RX ADMIN — Medication 1 TABLET(S): at 20:54

## 2021-08-18 RX ADMIN — Medication 25 MILLIGRAM(S): at 11:02

## 2021-08-18 RX ADMIN — Medication 240 MILLIGRAM(S): at 11:02

## 2021-08-18 RX ADMIN — Medication 1 MILLIGRAM(S): at 11:02

## 2021-08-18 RX ADMIN — LISINOPRIL 10 MILLIGRAM(S): 2.5 TABLET ORAL at 11:02

## 2021-08-18 RX ADMIN — Medication 1 TABLET(S): at 11:02

## 2021-08-18 RX ADMIN — PANTOPRAZOLE SODIUM 40 MILLIGRAM(S): 20 TABLET, DELAYED RELEASE ORAL at 11:02

## 2021-08-18 RX ADMIN — Medication 40 MILLIGRAM(S): at 05:41

## 2021-08-18 RX ADMIN — Medication 250 MILLIGRAM(S): at 11:02

## 2021-08-19 LAB
ANION GAP SERPL CALC-SCNC: 5 MMOL/L — SIGNIFICANT CHANGE UP (ref 5–17)
BUN SERPL-MCNC: 37 MG/DL — HIGH (ref 7–23)
CALCIUM SERPL-MCNC: 8.4 MG/DL — LOW (ref 8.5–10.1)
CHLORIDE SERPL-SCNC: 111 MMOL/L — HIGH (ref 96–108)
CO2 SERPL-SCNC: 27 MMOL/L — SIGNIFICANT CHANGE UP (ref 22–31)
CREAT SERPL-MCNC: 1.12 MG/DL — SIGNIFICANT CHANGE UP (ref 0.5–1.3)
GLUCOSE SERPL-MCNC: 109 MG/DL — HIGH (ref 70–99)
POTASSIUM SERPL-MCNC: 3.5 MMOL/L — SIGNIFICANT CHANGE UP (ref 3.5–5.3)
POTASSIUM SERPL-SCNC: 3.5 MMOL/L — SIGNIFICANT CHANGE UP (ref 3.5–5.3)
SODIUM SERPL-SCNC: 143 MMOL/L — SIGNIFICANT CHANGE UP (ref 135–145)

## 2021-08-19 PROCEDURE — 99232 SBSQ HOSP IP/OBS MODERATE 35: CPT

## 2021-08-19 RX ORDER — BUDESONIDE, MICRONIZED 100 %
9 POWDER (GRAM) MISCELLANEOUS AT BEDTIME
Refills: 0 | Status: DISCONTINUED | OUTPATIENT
Start: 2021-08-19 | End: 2021-08-23

## 2021-08-19 RX ADMIN — ENOXAPARIN SODIUM 40 MILLIGRAM(S): 100 INJECTION SUBCUTANEOUS at 09:37

## 2021-08-19 RX ADMIN — LISINOPRIL 10 MILLIGRAM(S): 2.5 TABLET ORAL at 09:37

## 2021-08-19 RX ADMIN — Medication 240 MILLIGRAM(S): at 09:37

## 2021-08-19 RX ADMIN — ATORVASTATIN CALCIUM 10 MILLIGRAM(S): 80 TABLET, FILM COATED ORAL at 22:31

## 2021-08-19 RX ADMIN — Medication 40 MILLIGRAM(S): at 09:37

## 2021-08-19 RX ADMIN — Medication 250 MILLIGRAM(S): at 22:30

## 2021-08-19 RX ADMIN — Medication 1 TABLET(S): at 22:31

## 2021-08-19 RX ADMIN — BUDESONIDE AND FORMOTEROL FUMARATE DIHYDRATE 2 PUFF(S): 160; 4.5 AEROSOL RESPIRATORY (INHALATION) at 08:14

## 2021-08-19 RX ADMIN — TIOTROPIUM BROMIDE 1 CAPSULE(S): 18 CAPSULE ORAL; RESPIRATORY (INHALATION) at 08:14

## 2021-08-19 RX ADMIN — Medication 1 MILLIGRAM(S): at 09:37

## 2021-08-19 RX ADMIN — BUDESONIDE AND FORMOTEROL FUMARATE DIHYDRATE 2 PUFF(S): 160; 4.5 AEROSOL RESPIRATORY (INHALATION) at 20:28

## 2021-08-19 RX ADMIN — Medication 250 MILLIGRAM(S): at 09:37

## 2021-08-19 RX ADMIN — Medication 25 MILLIGRAM(S): at 09:37

## 2021-08-19 RX ADMIN — Medication 1 TABLET(S): at 09:37

## 2021-08-19 RX ADMIN — Medication 20 MILLIEQUIVALENT(S): at 09:37

## 2021-08-19 RX ADMIN — Medication 9 MILLIGRAM(S): at 22:31

## 2021-08-19 RX ADMIN — Medication 25 MILLIGRAM(S): at 22:31

## 2021-08-19 NOTE — PROGRESS NOTE ADULT - ASSESSMENT
79 yo male with PMH of COPD, HTN, HLD, hemochromatosis admitted with:    #Sepsis, present on admission.  #b/l LE edema secondary to fluid overload with mild RLE cellulitis  #Right knee Swelling 2ndry to Gout and Pseudogout  - Doppler of LE  - Neg for DVT   - xray of the right knee - stable effusion  - Elevated ESR, CRP  - S/P IV lasix daily switch to PO lasix garcía   - ceftriaxone IV#6 change to ceftin 250 mg po q 12 hours for 8 more days  - elevate extremities  - PT eval  - ID consult appreciated   - Ortho consult appreciated  - S/P Aspiration of R knee (8/11) yielded +MSU and CPP ; GS negative, CUltures still pending.  - S/P prednisone 40mg PO QD#5; continue Colchine    #Hypokalemia 2ndry to Acute on Chronic Diarrhea   - CDiff and GI PCR neg   - monitor K  - GI consult appreciated - If no improvement can check labs/urine to evaluate for neuroendocrine tumor  - lactose free intolerant   - Start immodium  - patient refusing Colonoscopy, now rethininking about it   - CT scan a/p with IV contrast Distended colon containing stool and fluid.  - FU labs for celiac disease   - GI FU appreciated - CT with stool throughout colon but no impaction, seems to be liquid stool on CT. Possibly related to imodium use. cont Imodium BID for now, this is OK with CT findings    #Multiple renal and Bladder calculi  - outpatient      #COPD  - continue spiriva and advair    #HTN  - continue home meds    #HLD  - continue statin    #DVT prophylaxis  - lovenox    #Advance Directives  - FULL CODE    #Dispo - plan is rehab on Monday. unless patient agrees to colonoscopy  
80-year-old male with chronic diarrhea, now worsening.  40 pound weight loss over several years.  No prior endoscopic evaluation.  Rule out colitis/microscopic colitis, celiac disease, medication side effects, malignancy, and other conditions.  Stool testing was negative.  CRP and ESR are markedly elevated but this is in the setting of acute gout and is not necessarily related to any GI pathology.  CT with stool throughout colon but no impaction, seems to be liquid stool on CT. Possibly related to imodium use.   Colonoscopy 8/17 with colitis pending biopsy results.    Recommendations:  -follow up colon biopsy  -follow up labs for celiac disease  -Imodium prn  -reg diet  -d/w pt
81 yo male with PMH of COPD, HTN, HLD, hemochromatosis admitted on 8/9 for evaluation of right knee pain; afebrile, patient has difficulty ambulating secondary to edema; has had an aspirate of right knee, found to have crystals in the joint. No fever chills or any other complaints.     1. Patient admitted with crystalline arthropathy, superimposed cellulitis of the right knee; also noted with leukocytosis most likely reactive  - follow up cultures   - serial cbc and monitor temperature   - reviewed prior medical records to evaluate for resistant or atypical pathogens   - iv hydration and supportive care   - wound care  - will continue ceftriaxone as ordered, day #6  - will change to ceftin 250 mg po q 12 hours for 8 more days  - tolerating antibiotics without rashes or side effects   - physical therapy as tolerated  - rx for gout  2. other issues; per medicine
81 yo male with PMH of COPD, HTN, HLD, hemochromatosis admitted with:    #b/l LE edema secondary to fluid overload with mild RLE cellulitis R/O Right Septic Knee  - Doppler of LE  - Neg for DVT   - FU xray of the right knee  - FU ESR, CRP  - IV lasix daily x 1 more day   - ceftriaxone IV  - elevate extremities  - PT eval  - ID consult  - Ortho consult     #Hypokalemia 2ndry to Acute on Chronic Diarrhea   - will give an additional 40meq PO and IV KCL  - monitor K  - GI consult  - lactose free intolerant     #COPD  - continue spiriva and advair    #HTN  - continue home meds    #HLD  - continue statin    #DVT prophylaxis  - lovenox    #Advance Directives  - FULL CODE  
79 yo male with PMH of COPD, HTN, HLD, hemochromatosis admitted on 8/9 for evaluation of right knee pain; afebrile, patient has difficulty ambulating secondary to edema; has had an aspirate of right knee, found to have crystals in the joint. No fever chills or any other complaints.     1. Patient admitted with crystalline arthropathy, superimposed cellulitis of the right knee; also noted with leukocytosis most likely reactive  - follow up cultures   - serial cbc and monitor temperature   - reviewed prior medical records to evaluate for resistant or atypical pathogens   - iv hydration and supportive care   - wound care  - will continue ceftriaxone as ordered, day #3  - tolerating antibiotics without rashes or side effects   - physical therapy as tolerated  - rx for gout  2. other issues; per medicine
79 yo male with PMH of COPD, HTN, HLD, hemochromatosis admitted on 8/9 for evaluation of right knee pain; afebrile, patient has difficulty ambulating secondary to edema; has had an aspirate of right knee, found to have crystals in the joint. No fever chills or any other complaints.     1. Patient admitted with crystalline arthropathy, superimposed cellulitis of the right knee; also noted with leukocytosis most likely reactive  - follow up cultures   - serial cbc and monitor temperature   - reviewed prior medical records to evaluate for resistant or atypical pathogens   - iv hydration and supportive care   - wound care  - will continue ceftriaxone as ordered, day #4  - tolerating antibiotics without rashes or side effects   - physical therapy as tolerated  - rx for gout  2. other issues; per medicine
79 yo male with PMH of COPD, HTN, HLD, hemochromatosis admitted with:    #Sepsis, present on admission.  #b/l LE edema secondary to fluid overload with mild RLE cellulitis  #Right knee Swelling 2ndry to Gout and Pseudogout  - Doppler of LE  - Neg for DVT   - xray of the right knee - stable effusion  - Elevated ESR, CRP  - S/P IV lasix daily switch to PO lasix garcía   - ceftriaxone IV  - elevate extremities  - PT eval  - ID consult appreciated   - Ortho consult appreciated  - S/P Aspiration of R knee (8/11) yielded +MSU and CPP ; GS negative, CUltures still pending.  - start prednisone 40mg PO QD garcía will start Colchine     #Hypokalemia 2ndry to Acute on Chronic Diarrhea   - CDiff and GI PCR neg   - will give an additiona 40meq PO X 3 doses   - monitor K  - GI consult  - lactose free intolerant   - Start immodium    #COPD  - continue spiriva and advair    #HTN  - continue home meds    #HLD  - continue statin    #DVT prophylaxis  - lovenox    #Advance Directives  - FULL CODE  
80-year-old male with chronic diarrhea, now worsening.  40 pound weight loss over several years.    Celiac labs negative.  Colonoscopy shows lymphocytic colitis.    Recommendations:  -started entocort 9 mg QHS for microscopic colitis, continue until seen as outpatient  -Imodium prn  -reg diet  -d/w pt  -no GI objection to d/c  -outpatient follow up in 1 month
80-year-old male with chronic diarrhea, now worsening.  40 pound weight loss over several years.  No prior endoscopic evaluation.  Rule out colitis/microscopic colitis, celiac disease, medication side effects, malignancy, and other conditions.  Stool testing was negative.  CRP and ESR are markedly elevated but this is in the setting of acute gout and is not necessarily related to any GI pathology.    Recommendations:  -Recommend colonoscopy but the patient declines colonoscopy  -Recommend CT scan a/p with IV contrast but he wants to consider this and will let team know today after d/w dtr  -labs for celiac disease pending  -cont Imodium BID for now  -potassium repletion prn  -If no improvement can check labs/urine to evaluate for neuroendocrine tumor but this seems unlikely and would not typically be the recommended first step however he is refusing colonoscopy.  -if he does not want to have his diarrhea and WL evaluated now he can always follow up as outpatient.
81 yo male with PMH of COPD, HTN, HLD, hemochromatosis admitted with:    #Sepsis, present on admission.  #b/l LE edema secondary to fluid overload with mild RLE cellulitis  #Right knee Swelling 2ndry to Gout and Pseudogout  - Doppler of LE  - Neg for DVT   - xray of the right knee - stable effusion  - Elevated ESR, CRP  - S/P IV lasix daily switch to PO lasix garcía   - ceftriaxone IV#5  - elevate extremities  - PT eval  - ID consult appreciated   - Ortho consult appreciated  - S/P Aspiration of R knee (8/11) yielded +MSU and CPP ; GS negative, CUltures still pending.  - continue prednisone 40mg PO QD#4 and Colchine#3    #Hypokalemia 2ndry to Acute on Chronic Diarrhea   - CDiff and GI PCR neg   - monitor K  - GI consult appreciated - If no improvement can check labs/urine to evaluate for neuroendocrine tumor  - lactose free intolerant   - Start immodium  - patient refusing Colonoscopy   - CT scan a/p with IV contrast Distended colon containing stool and fluid.  - FU labs for celiac disease   - GI FU    #Multiple renal and Bladder calculi  - outpatient      #COPD  - continue spiriva and advair    #HTN  - continue home meds    #HLD  - continue statin    #DVT prophylaxis  - lovenox    #Advance Directives  - FULL CODE    #Dispo - plan is rehab on Monday  
79 yo male with PMH of COPD, HTN, HLD, hemochromatosis admitted with:    #Sepsis, present on admission.  #b/l LE edema secondary to fluid overload with mild RLE cellulitis  #Right knee Swelling 2ndry to Gout and Pseudogout  - Doppler of LE  - Neg for DVT   - xray of the right knee - stable effusion  - Elevated ESR, CRP  - S/P IV lasix daily switch to PO lasix after colonoscopy  - ceftriaxone IV#6 change to ceftin 250 mg po q 12H #1 for 8 more days  - elevate extremities  - PT eval  - ID consult appreciated   - Ortho consult appreciated  - S/P Aspiration of R knee (8/11) yielded +MSU and CPP ; GS negative, CUltures still pending.  - S/P prednisone 40mg PO QD#5; continue Colchine    #Hypokalemia 2ndry to Acute on Chronic Diarrhea   - CDiff and GI PCR neg   - monitor K  - GI consult appreciated - If no improvement can check labs/urine to evaluate for neuroendocrine tumor  - lactose free intolerant   - Start immodium  - CT scan a/p with IV contrast Distended colon containing stool and fluid.  - FU labs for celiac disease   - GI FU appreciated - CT with stool throughout colon but no impaction, seems to be liquid stool on CT. Possibly related to imodium use. cont Imodium BID for now, this is OK with CT findings  - patient initially refused Colonoscopy, now wants to do it. FU with Dr Hahn    #Multiple renal and Bladder calculi  - outpatient      #COPD  - continue spiriva and advair    #HTN  - continue home meds    #HLD  - continue statin    #DVT prophylaxis  - lovenox    #Advance Directives  - FULL CODE    #Dispo - Patietn here with RLE Cellulitis and right knee gout; S/P IV rocephin and prednisone X 5 days; on colhicine; patient has a hx of chronic diarrhea and never had work up done; Stool studies neg and now on immodium BID; patient finally agreed to coloscopy FU with GI; resume lasix after colonoscopy  
79 yo male with PMH of COPD, HTN, HLD, hemochromatosis admitted with:    #Sepsis, present on admission.  #b/l LE edema secondary to fluid overload with mild RLE cellulitis  #Right knee Swelling 2ndry to Gout and Pseudogout  - Doppler of LE  - Neg for DVT   - xray of the right knee - stable effusion  - Elevated ESR, CRP  - S/P IV lasix daily switch to PO lasix garcía   - ceftriaxone IV#3  - elevate extremities  - PT eval  - ID consult appreciated   - Ortho consult appreciated  - S/P Aspiration of R knee (8/11) yielded +MSU and CPP ; GS negative, CUltures still pending.  - continue prednisone 40mg PO QD#2 and Colchine#1    #Hypokalemia 2ndry to Acute on Chronic Diarrhea   - CDiff and GI PCR neg   - monitor K  - GI consult appreciated - If no improvement can check labs/urine to evaluate for neuroendocrine tumor  - lactose free intolerant   - Start immodium  - patient refusing Colonoscopy   - recommended that patient get a CT scan a/p with IV contrast but he wants to consider this and let us know tomorrow.   - FU labs for celiac disease     #COPD  - continue spiriva and advair    #HTN  - continue home meds    #HLD  - continue statin    #DVT prophylaxis  - lovenox    #Advance Directives  - FULL CODE  
80-year-old male with chronic diarrhea, now worsening.  40 pound weight loss over several years.  No prior endoscopic evaluation.  Rule out colitis/microscopic colitis, celiac disease, medication side effects, malignancy, and other conditions.  Stool testing was negative.  CRP and ESR are markedly elevated but this is in the setting of acute gout and is not necessarily related to any GI pathology.  CT with stool throughout colon but no impaction, seems to be liquid stool on CT. Possibly related to imodium use.     Recommendations:  -Recommend colonoscopy but the patient declines colonoscopy; offered again and discussed with his daughter at bedside. They will make decision soon.  -labs for celiac disease pending  -cont Imodium BID for now, this is OK with CT findings  -if he does not want to have his diarrhea and WL evaluated now he can always follow up as outpatient.
81 yo male with PMH of COPD, HTN, HLD, hemochromatosis admitted with:    #Sepsis, present on admission.  #b/l LE edema secondary to fluid overload with mild RLE cellulitis  #Right knee Swelling 2ndry to Gout and Pseudogout  - Doppler of LE  - Neg for DVT   - xray of the right knee - stable effusion  - Elevated ESR, CRP  - S/P IV lasix daily switch to PO lasix garcía after IVF is gvien for contrast   - ceftriaxone IV#4  - elevate extremities  - PT eval  - ID consult appreciated   - Ortho consult appreciated  - S/P Aspiration of R knee (8/11) yielded +MSU and CPP ; GS negative, CUltures still pending.  - continue prednisone 40mg PO QD#3 and Colchine#2    #Hypokalemia 2ndry to Acute on Chronic Diarrhea   - CDiff and GI PCR neg   - monitor K  - GI consult appreciated - If no improvement can check labs/urine to evaluate for neuroendocrine tumor  - lactose free intolerant   - Start immodium  - patient refusing Colonoscopy   - FU CT scan a/p with IV contrast -IVF X 10H and hold Lasix. in  - FU labs for celiac disease     #COPD  - continue spiriva and advair    #HTN  - continue home meds    #HLD  - continue statin    #DVT prophylaxis  - lovenox    #Advance Directives  - FULL CODE  
80-year-old male with chronic diarrhea, now worsening.  40 pound weight loss over several years.  No prior endoscopic evaluation.  Rule out colitis/microscopic colitis, celiac disease, medication side effects, malignancy, and other conditions.  Stool testing was negative.  CRP and ESR are markedly elevated but this is in the setting of acute gout and is not necessarily related to any GI pathology.    Recommendations:  -Recommend colonoscopy but the patient declines colonoscopy  -Recommend CT scan a/p with IV contrast but he wants to consider this and let me know tomorrow.   -labs for celiac disease pending  -Imodium or Lomotil as needed for diarrhea  -potassium repletion prn  -If no improvement can check labs/urine to evaluate for neuroendocrine tumor but this seems unlikely and would not typically be the recommended first step however he is refusing colonoscopy.  -if he does not want to have his diarrhea and WL evaluated now he can always follow up as outpatient.

## 2021-08-20 ENCOUNTER — TRANSCRIPTION ENCOUNTER (OUTPATIENT)
Age: 81
End: 2021-08-20

## 2021-08-20 LAB — SARS-COV-2 RNA SPEC QL NAA+PROBE: SIGNIFICANT CHANGE UP

## 2021-08-20 PROCEDURE — 99232 SBSQ HOSP IP/OBS MODERATE 35: CPT

## 2021-08-20 RX ORDER — BUDESONIDE, MICRONIZED 100 %
1 POWDER (GRAM) MISCELLANEOUS
Qty: 0 | Refills: 0 | DISCHARGE
Start: 2021-08-20

## 2021-08-20 RX ORDER — LACTOBACILLUS ACIDOPHILUS 100MM CELL
1 CAPSULE ORAL
Qty: 0 | Refills: 0 | DISCHARGE
Start: 2021-08-20

## 2021-08-20 RX ADMIN — Medication 40 MILLIGRAM(S): at 05:50

## 2021-08-20 RX ADMIN — Medication 9 MILLIGRAM(S): at 21:28

## 2021-08-20 RX ADMIN — Medication 20 MILLIEQUIVALENT(S): at 09:35

## 2021-08-20 RX ADMIN — Medication 250 MILLIGRAM(S): at 21:28

## 2021-08-20 RX ADMIN — Medication 1 TABLET(S): at 21:28

## 2021-08-20 RX ADMIN — Medication 3 MILLIGRAM(S): at 21:28

## 2021-08-20 RX ADMIN — Medication 240 MILLIGRAM(S): at 09:36

## 2021-08-20 RX ADMIN — Medication 25 MILLIGRAM(S): at 21:28

## 2021-08-20 RX ADMIN — TIOTROPIUM BROMIDE 1 CAPSULE(S): 18 CAPSULE ORAL; RESPIRATORY (INHALATION) at 09:03

## 2021-08-20 RX ADMIN — BUDESONIDE AND FORMOTEROL FUMARATE DIHYDRATE 2 PUFF(S): 160; 4.5 AEROSOL RESPIRATORY (INHALATION) at 09:03

## 2021-08-20 RX ADMIN — LISINOPRIL 10 MILLIGRAM(S): 2.5 TABLET ORAL at 09:35

## 2021-08-20 RX ADMIN — ATORVASTATIN CALCIUM 10 MILLIGRAM(S): 80 TABLET, FILM COATED ORAL at 21:28

## 2021-08-20 RX ADMIN — ENOXAPARIN SODIUM 40 MILLIGRAM(S): 100 INJECTION SUBCUTANEOUS at 09:36

## 2021-08-20 RX ADMIN — BUDESONIDE AND FORMOTEROL FUMARATE DIHYDRATE 2 PUFF(S): 160; 4.5 AEROSOL RESPIRATORY (INHALATION) at 20:16

## 2021-08-20 RX ADMIN — Medication 250 MILLIGRAM(S): at 09:35

## 2021-08-20 RX ADMIN — Medication 1 TABLET(S): at 09:35

## 2021-08-20 RX ADMIN — Medication 1 MILLIGRAM(S): at 09:35

## 2021-08-20 RX ADMIN — Medication 25 MILLIGRAM(S): at 09:35

## 2021-08-20 NOTE — DISCHARGE NOTE PROVIDER - CARE PROVIDER_API CALL
Swapnil Jiménez)  Critical Care Medicine; Internal Medicine; Pulmonary Disease; Sleep Medicine  [address] [place] [address]  Phone: [phone]  Fax: [phone]  Follow Up Time:     Osorio Hahn)  Gastroenterology; Internal Medicine  [address] [facility], [address]  [place] [address]  Phone: [phone]  Fax: [phone]  Follow Up Time:

## 2021-08-20 NOTE — DISCHARGE NOTE PROVIDER - NSDCMRMEDTOKEN_GEN_ALL_CORE_FT
Advair Diskus 250 mcg-50 mcg inhalation powder: 1 puff(s) inhaled once a day  atorvastatin 10 mg oral tablet: 1 tab(s) orally once a day  budesonide 9 mg oral capsule, extended release: 1 cap(s) orally once a day (in the morning)  DilTIAZem (Eqv-Cardizem CD) 240 mg/24 hours oral capsule, extended release: 1 cap(s) orally once a day  folic acid 1 mg oral tablet: 1 tab(s) orally once a day  furosemide 40 mg oral tablet: 1 tab(s) orally once a day  lactobacillus acidophilus oral capsule: 1 cap(s) orally once a day  lisinopril 10 mg oral tablet: 1 tab(s) orally once a day  metoprolol tartrate 50 mg oral tablet: 0.5 tab(s) orally 2 times a day  Potassium Chloride (Eqv-Klor-Con M20) 20 mEq oral tablet, extended release: 4 tab(s) orally once a day  Spiriva HandiHaler 18 mcg inhalation capsule: 1 cap(s) inhaled once a day

## 2021-08-20 NOTE — DISCHARGE NOTE PROVIDER - HOSPITAL COURSE
Vital Signs Last 24 Hrs  T(C): 36.4 (20 Aug 2021 08:27), Max: 36.7 (19 Aug 2021 15:33)  T(F): 97.6 (20 Aug 2021 08:27), Max: 98 (19 Aug 2021 15:33)  HR: 86 (20 Aug 2021 08:27) (59 - 90)  BP: 150/86 (20 Aug 2021 08:27) (126/89 - 150/86)  BP(mean): --  RR: 17 (20 Aug 2021 08:27) (16 - 18)  SpO2: 97% (20 Aug 2021 08:27) (96% - 100%)    HEENT:   pupils equal and reactive, EOMI, no oropharyngeal lesions, erythema, exudates, oral thrush    NECK:   supple, no carotid bruits, no palpable lymph nodes, no thyromegaly    CV:  +S1, +S2, regular, no murmurs or rubs    RESP:   lungs clear to auscultation bilaterally, no wheezing, rales, rhonchi, good air entry bilaterally    BREAST:  not examined    GI:  abdomen soft, non-tender, non-distended, normal BS, no bruits, no abdominal masses, no palpable masses    RECTAL:  not examined    :  not examined    MSK:   normal muscle tone, no atrophy, no rigidity, no contractions    EXT:   no clubbing, no cyanosis, no edema, no calf pain, swelling or erythema    VASCULAR:  pulses equal and symmetric in the upper and lower extremities    NEURO:  AAOX3, no focal neurological deficits, follows all commands, able to move extremities spontaneously    SKIN:  no ulcers, lesions or rashes    19 Aug 2021 08:44    143    |  111    |  37     ----------------------------<  109    3.5     |  27     |  1.12     Ca    8.4        19 Aug 2021 08:44            Hospital Course:    81 yo male with PMH of COPD, HTN, HLD, hemochromatosis admitted with:    #Sepsis, present on admission.  #b/l LE edema secondary to fluid overload with mild RLE cellulitis    - Doppler of LE  - Neg for DVT   - S/P IV lasix >> now on PO  - ceftriaxone IV#6 change to ceftin 250 mg po q 12H #1 for 8 more days (end date 8/23/21)  - elevate extremities  - PT eval  - ID consult appreciated   - Ortho consult appreciated        #Right knee Swelling 2ndry to Gout and Pseudogout  - S/P Aspiration of R knee (8/11) yielded +MSU and CPP ; GS negative, CUltures still pending.  - S/P prednisone 40mg PO QD#5;  - xray of the right knee - stable effusion  - Elevated ESR, CRP    #Hypokalemia 2ndry to Acute on Chronic Diarrhea   - CDiff and GI PCR neg   - GI FU appreciated - CT with stool throughout colon but no impaction, seems to be liquid stool on CT. Possibly related to imodium use. cont Imodium BID for now, this is OK with CT findings  -s/p colonoscopy >> outpatient f/u for biopsy results>> microscopic colitis >started entocort 9 mg QHS for microscopic colitis, continue until seen as outpatient  -Imodium prn      #acute gout  -s/p oral prednisone    #Multiple renal and Bladder calculi  - outpatient        Dispo: [facility]. status quo no change.

## 2021-08-20 NOTE — DISCHARGE NOTE PROVIDER - NSDCCPCAREPLAN_GEN_ALL_CORE_FT
PRINCIPAL DISCHARGE DIAGNOSIS  Diagnosis: Cellulitis of right lower extremity  Assessment and Plan of Treatment:   *Continue with antibitoics till 8/23/21  *follow up outpatient with primary care provider in one week.      SECONDARY DISCHARGE DIAGNOSES  Diagnosis: Gout  Assessment and Plan of Treatment:   *completed treatmetn while inpatient  *finsihed prednisone    Diagnosis: Edema leg  Assessment and Plan of Treatment:   *continue with oral lasix    Diagnosis: Microscopic colitis  Assessment and Plan of Treatment: *started entocort 9 mg QHS for microscopic colitis, continue until seen as outpatient  -Imodium prn  -folow up outpatient with gastroenterolist Dr. charles in one week.

## 2021-08-21 PROCEDURE — 99232 SBSQ HOSP IP/OBS MODERATE 35: CPT

## 2021-08-21 RX ADMIN — Medication 240 MILLIGRAM(S): at 09:27

## 2021-08-21 RX ADMIN — Medication 9 MILLIGRAM(S): at 20:53

## 2021-08-21 RX ADMIN — Medication 1 TABLET(S): at 09:27

## 2021-08-21 RX ADMIN — Medication 40 MILLIGRAM(S): at 06:15

## 2021-08-21 RX ADMIN — Medication 1 MILLIGRAM(S): at 09:28

## 2021-08-21 RX ADMIN — Medication 250 MILLIGRAM(S): at 09:27

## 2021-08-21 RX ADMIN — Medication 25 MILLIGRAM(S): at 20:54

## 2021-08-21 RX ADMIN — Medication 25 MILLIGRAM(S): at 09:27

## 2021-08-21 RX ADMIN — LISINOPRIL 10 MILLIGRAM(S): 2.5 TABLET ORAL at 09:27

## 2021-08-21 RX ADMIN — BUDESONIDE AND FORMOTEROL FUMARATE DIHYDRATE 2 PUFF(S): 160; 4.5 AEROSOL RESPIRATORY (INHALATION) at 20:29

## 2021-08-21 RX ADMIN — Medication 250 MILLIGRAM(S): at 20:54

## 2021-08-21 RX ADMIN — TIOTROPIUM BROMIDE 1 CAPSULE(S): 18 CAPSULE ORAL; RESPIRATORY (INHALATION) at 09:14

## 2021-08-21 RX ADMIN — Medication 20 MILLIEQUIVALENT(S): at 09:27

## 2021-08-21 RX ADMIN — ATORVASTATIN CALCIUM 10 MILLIGRAM(S): 80 TABLET, FILM COATED ORAL at 20:54

## 2021-08-21 RX ADMIN — BUDESONIDE AND FORMOTEROL FUMARATE DIHYDRATE 2 PUFF(S): 160; 4.5 AEROSOL RESPIRATORY (INHALATION) at 09:14

## 2021-08-21 RX ADMIN — Medication 1 TABLET(S): at 20:53

## 2021-08-21 RX ADMIN — ENOXAPARIN SODIUM 40 MILLIGRAM(S): 100 INJECTION SUBCUTANEOUS at 09:27

## 2021-08-22 PROCEDURE — 99232 SBSQ HOSP IP/OBS MODERATE 35: CPT

## 2021-08-22 RX ADMIN — Medication 40 MILLIGRAM(S): at 10:27

## 2021-08-22 RX ADMIN — BUDESONIDE AND FORMOTEROL FUMARATE DIHYDRATE 2 PUFF(S): 160; 4.5 AEROSOL RESPIRATORY (INHALATION) at 07:49

## 2021-08-22 RX ADMIN — Medication 20 MILLIEQUIVALENT(S): at 10:28

## 2021-08-22 RX ADMIN — Medication 25 MILLIGRAM(S): at 10:28

## 2021-08-22 RX ADMIN — ENOXAPARIN SODIUM 40 MILLIGRAM(S): 100 INJECTION SUBCUTANEOUS at 10:27

## 2021-08-22 RX ADMIN — Medication 1 TABLET(S): at 21:04

## 2021-08-22 RX ADMIN — Medication 1 TABLET(S): at 10:27

## 2021-08-22 RX ADMIN — TIOTROPIUM BROMIDE 1 CAPSULE(S): 18 CAPSULE ORAL; RESPIRATORY (INHALATION) at 07:49

## 2021-08-22 RX ADMIN — Medication 240 MILLIGRAM(S): at 10:27

## 2021-08-22 RX ADMIN — Medication 250 MILLIGRAM(S): at 21:05

## 2021-08-22 RX ADMIN — ATORVASTATIN CALCIUM 10 MILLIGRAM(S): 80 TABLET, FILM COATED ORAL at 21:05

## 2021-08-22 RX ADMIN — Medication 1 MILLIGRAM(S): at 10:28

## 2021-08-22 RX ADMIN — BUDESONIDE AND FORMOTEROL FUMARATE DIHYDRATE 2 PUFF(S): 160; 4.5 AEROSOL RESPIRATORY (INHALATION) at 20:21

## 2021-08-22 RX ADMIN — Medication 9 MILLIGRAM(S): at 21:04

## 2021-08-22 RX ADMIN — Medication 250 MILLIGRAM(S): at 10:27

## 2021-08-22 RX ADMIN — LISINOPRIL 10 MILLIGRAM(S): 2.5 TABLET ORAL at 10:27

## 2021-08-23 ENCOUNTER — TRANSCRIPTION ENCOUNTER (OUTPATIENT)
Age: 81
End: 2021-08-23

## 2021-08-23 VITALS
RESPIRATION RATE: 18 BRPM | SYSTOLIC BLOOD PRESSURE: 121 MMHG | DIASTOLIC BLOOD PRESSURE: 71 MMHG | OXYGEN SATURATION: 98 % | TEMPERATURE: 98 F | HEART RATE: 81 BPM

## 2021-08-23 PROCEDURE — 99239 HOSP IP/OBS DSCHRG MGMT >30: CPT

## 2021-08-23 RX ADMIN — Medication 40 MILLIGRAM(S): at 09:58

## 2021-08-23 RX ADMIN — Medication 250 MILLIGRAM(S): at 09:57

## 2021-08-23 RX ADMIN — Medication 1 MILLIGRAM(S): at 09:58

## 2021-08-23 RX ADMIN — ENOXAPARIN SODIUM 40 MILLIGRAM(S): 100 INJECTION SUBCUTANEOUS at 09:58

## 2021-08-23 RX ADMIN — Medication 1 TABLET(S): at 09:58

## 2021-08-23 RX ADMIN — TIOTROPIUM BROMIDE 1 CAPSULE(S): 18 CAPSULE ORAL; RESPIRATORY (INHALATION) at 08:42

## 2021-08-23 RX ADMIN — Medication 20 MILLIEQUIVALENT(S): at 09:58

## 2021-08-23 RX ADMIN — BUDESONIDE AND FORMOTEROL FUMARATE DIHYDRATE 2 PUFF(S): 160; 4.5 AEROSOL RESPIRATORY (INHALATION) at 08:42

## 2021-08-23 NOTE — PROGRESS NOTE ADULT - REASON FOR ADMISSION
LE edema

## 2021-08-23 NOTE — DISCHARGE NOTE NURSING/CASE MANAGEMENT/SOCIAL WORK - PATIENT PORTAL LINK FT
You can access the FollowMyHealth Patient Portal offered by [facility] by registering at the following website: [email or web address]. By joining [employer]’s FollowMyHealth portal, you will also be able to view your health information using other applications (apps) compatible with our system.

## 2021-08-23 NOTE — PROGRESS NOTE ADULT - PROVIDER SPECIALTY LIST ADULT
Hospitalist
Infectious Disease
Gastroenterology
Hospitalist
Gastroenterology
Hospitalist
Infectious Disease
Gastroenterology
Hospitalist
Infectious Disease

## 2021-08-23 NOTE — PROGRESS NOTE ADULT - SUBJECTIVE AND OBJECTIVE BOX
Date of service: 08-12-21 @ 14:11      Patient sitting in chair; ambulates with physical therapy, afebrile, no complaints      ROS: no fever or chills; denies dizziness, no HA, no SOB or cough, no abdominal pain, no diarrhea or constipation; no dysuria, no urinary frequency, no legs pain, no rashes    MEDICATIONS  (STANDING):  atorvastatin 10 milliGRAM(s) Oral at bedtime  budesonide 160 MICROgram(s)/formoterol 4.5 MICROgram(s) Inhaler 2 Puff(s) Inhalation two times a day  cefTRIAXone Injectable.      cefTRIAXone Injectable. 1000 milliGRAM(s) IV Push every 24 hours  diltiazem    milliGRAM(s) Oral daily  enoxaparin Injectable 40 milliGRAM(s) SubCutaneous daily  folic acid 1 milliGRAM(s) Oral daily  lisinopril 10 milliGRAM(s) Oral daily  loperamide 2 milliGRAM(s) Oral three times a day  metoprolol tartrate 25 milliGRAM(s) Oral two times a day  potassium chloride    Tablet ER 20 milliEquivalent(s) Oral daily  potassium chloride    Tablet ER 40 milliEquivalent(s) Oral every 4 hours  predniSONE   Tablet 40 milliGRAM(s) Oral daily  tiotropium 18 MICROgram(s) Capsule 1 Capsule(s) Inhalation daily    MEDICATIONS  (PRN):  acetaminophen   Tablet .. 650 milliGRAM(s) Oral every 6 hours PRN Temp greater or equal to 38.5C (101.3F), Mild Pain (1 - 3)  aluminum hydroxide/magnesium hydroxide/simethicone Suspension 30 milliLiter(s) Oral every 4 hours PRN Dyspepsia  melatonin 3 milliGRAM(s) Oral at bedtime PRN Insomnia  ondansetron Injectable 4 milliGRAM(s) IV Push every 8 hours PRN Nausea and/or Vomiting      Vital Signs Last 24 Hrs  T(C): 36.6 (12 Aug 2021 08:13), Max: 36.7 (11 Aug 2021 15:15)  T(F): 97.8 (12 Aug 2021 08:13), Max: 98.1 (11 Aug 2021 15:15)  HR: 73 (12 Aug 2021 08:13) (72 - 82)  BP: 120/79 (12 Aug 2021 08:13) (98/67 - 120/79)  BP(mean): --  RR: 18 (12 Aug 2021 08:13) (16 - 18)  SpO2: 98% (12 Aug 2021 08:13) (97% - 100%)        Physical Exam:          Constitutional: frail looking  HEENT: NC/AT, EOMI, PERRLA, conjunctivae clear; ears and nose atraumatic; pharynx clear  Neck: supple; thyroid not palpable  Back: no tenderness  Respiratory: respiratory effort normal; clear to auscultation  Cardiovascular: S1S2 regular, no murmurs  Abdomen: soft, not tender, not distended, positive BS; no liver or spleen organomegaly  Genitourinary: no suprapubic tenderness  Musculoskeletal: no muscle tenderness, right knee with dressing in place  Neurological/ Psychiatric: AxOx3, judgement and insight normal;  moving all extremities  Skin: no rashes; no palpable lesions    Labs: all available labs reviewed                         Labs:                        13.7   14.75 )-----------( 279      ( 12 Aug 2021 09:16 )             40.0     08-12    141  |  105  |  33<H>  ----------------------------<  199<H>  3.1<L>   |  30  |  1.13    Ca    8.7      12 Aug 2021 09:16  Phos  2.6     08-12  Mg     1.9     08-12    TPro  6.0  /  Alb  2.1<L>  /  TBili  1.7<H>  /  DBili  x   /  AST  18  /  ALT  17  /  AlkPhos  82  08-11           Cultures:       GI PCR Panel, Stool (collected 08-10-21 @ 21:28)  Source: .Stool Feces  Final Report (08-11-21 @ 07:10):    GI PCR Results: NOT detected    *******Please Note:*******    GI panel PCR evaluates for:    Campylobacter, Plesiomonas shigelloides, Salmonella,    Vibrio, Yersinia enterocolitica, Enteroaggregative    Escherichia coli (EAEC), Enteropathogenic E.coli (EPEC),    Enterotoxigenic E. coli (ETEC) lt/st, Shiga-like    toxin-producing E. coli (STEC) stx1/stx2,    Shigella/ Enteroinvasive E. coli (EIEC), Cryptosporidium,    Cyclospora cayetanensis, Entamoeba histolytica,    Giardia lamblia, Adenovirus F 40/41, Astrovirus,    Norovirus GI/GII, Rotavirus A, Sapovirus    Culture - Blood (collected 08-09-21 @ 14:46)  Source: .Blood None  Preliminary Report (08-10-21 @ 20:01):    No growth to date.      C-Reactive Protein, Serum: 236 mg/L (08-11-21 @ 09:10)  C-Reactive Protein, Serum: 226 mg/L (08-10-21 @ 17:36)              Crystals, Synovial Fluid (08.10.21 @ 17:36)    Synovial Crystals Clarity: Turbid    Synovial Crystals Tube: Red Top Tube    Synovial Crystals Color: Yellow    Synovial Crystals Id: Crystals Present Many extra and intra cellular Calcium Pyrophosphate dihydrate crystals  and many intra and extra Monosodium Urate crystals seen under compensated  polarized light microscopy    Cell Count, Body Fluid (08.10.21 @ 17:36)    Monocyte/Macrophage Count - Body Fluid: 1 %    Fluid Segmented Granulocytes: 95 %    Fluid Type: Synovial fluid    Body Fluid Appearance: Cloudy    BF Lymphocytes: 4 %    Tube Type: Lavender    Color - Body Fluid: Brown    Total Nucleated Cell Count, Body Fluid: 8333: Peritoneal/Pleural/ Pericardial  Body Fluid Types            Total Nucleated cells: <500/uL            Neutrophils: <25%          Synovial Body Fluid Type           Total Nucleated cells: <150/uL           Neutrophils: <25%           Lymphocytes: <75%           Moncytes/Macrophages: </=70%  Please note reference range updated effective Nov 26, 2019 /uL    Total RBC Count: 79169 /uL                    Culture Results:   GI PCR Results: NOT detected  *******Please Note:*******  GI panel PCR evaluates for:  Campylobacter, Plesiomonas shigelloides, Salmonella,  Vibrio, Yersinia enterocolitica, Enteroaggregative  Escherichia coli (EAEC), Enteropathogenic E.coli (EPEC),  Enterotoxigenic E. coli (ETEC) lt/st, Shiga-like  toxin-producing E. coli (STEC) stx1/stx2,  Shigella/ Enteroinvasive E. coli (EIEC), Cryptosporidium,  Cyclospora cayetanensis, Entamoeba histolytica,  Giardia lamblia, Adenovirus F 40/41, Astrovirus,  Norovirus GI/GII, Rotavirus A, Sapovirus (08-10 @ 21:28)    Radiology: all available radiological tests reviewed    Advanced directives addressed: full resuscitation
Cheif complaints and Diagnosis: RLE cellulitis    Subjective:       REVIEW OF SYSTEMS:    CONSTITUTIONAL: No weakness, fevers or chills  EYES/ENT: No visual changes;  No vertigo or throat pain   NECK: No pain or stiffness  RESPIRATORY: No cough, wheezing, hemoptysis; No shortness of breath  CARDIOVASCULAR: No chest pain or palpitations  GASTROINTESTINAL: No abdominal or epigastric pain. No nausea, vomiting, or hematemesis; No diarrhea or constipation. No melena or hematochezia.  GENITOURINARY: No dysuria, frequency or hematuria  NEUROLOGICAL: No numbness or weakness  SKIN: No itching, burning, rashes, or lesions   All other review of systems is negative unless indicated above      Vital Signs Last 24 Hrs  T(C): 35.8 (17 Aug 2021 08:11), Max: 36.4 (16 Aug 2021 15:30)  T(F): 96.5 (17 Aug 2021 08:11), Max: 97.6 (16 Aug 2021 15:30)  HR: 63 (17 Aug 2021 08:11) (63 - 98)  BP: 125/72 (17 Aug 2021 08:11) (105/77 - 125/72)  BP(mean): --  RR: 18 (17 Aug 2021 08:11) (18 - 18)  SpO2: 99% (17 Aug 2021 08:11) (98% - 100%)    HEENT:   pupils equal and reactive, EOMI, no oropharyngeal lesions, erythema, exudates, oral thrush    NECK:   supple, no carotid bruits, no palpable lymph nodes, no thyromegaly    CV:  +S1, +S2, regular, no murmurs or rubs    RESP:   lungs clear to auscultation bilaterally, no wheezing, rales, rhonchi, good air entry bilaterally    BREAST:  not examined    GI:  abdomen soft, non-tender, non-distended, normal BS, no bruits, no abdominal masses, no palpable masses    RECTAL:  not examined    :  not examined    MSK:   normal muscle tone, no atrophy, no rigidity, no contractions    EXT:   no clubbing, no cyanosis, no edema, no calf pain, swelling or erythema    VASCULAR:  pulses equal and symmetric in the upper and lower extremities    NEURO:  AAOX3, no focal neurological deficits, follows all commands, able to move extremities spontaneously    SKIN:  no ulcers, lesions or rashes    MEDICATIONS  (STANDING):  atorvastatin 10 milliGRAM(s) Oral at bedtime  budesonide 160 MICROgram(s)/formoterol 4.5 MICROgram(s) Inhaler 2 Puff(s) Inhalation two times a day  cefuroxime   Tablet 250 milliGRAM(s) Oral every 12 hours  colchicine 0.6 milliGRAM(s) Oral every 12 hours  diltiazem    milliGRAM(s) Oral daily  enoxaparin Injectable 40 milliGRAM(s) SubCutaneous daily  folic acid 1 milliGRAM(s) Oral daily  lactobacillus acidophilus 1 Tablet(s) Oral two times a day  lisinopril 10 milliGRAM(s) Oral daily  metoprolol tartrate 25 milliGRAM(s) Oral two times a day  pantoprazole    Tablet 40 milliGRAM(s) Oral daily  potassium chloride    Tablet ER 20 milliEquivalent(s) Oral daily  predniSONE   Tablet 40 milliGRAM(s) Oral daily  sodium chloride 0.9%. 1000 milliLiter(s) (50 mL/Hr) IV Continuous <Continuous>  tiotropium 18 MICROgram(s) Capsule 1 Capsule(s) Inhalation daily    MEDICATIONS  (PRN):  acetaminophen   Tablet .. 650 milliGRAM(s) Oral every 6 hours PRN Temp greater or equal to 38.5C (101.3F), Mild Pain (1 - 3)  aluminum hydroxide/magnesium hydroxide/simethicone Suspension 30 milliLiter(s) Oral every 4 hours PRN Dyspepsia  melatonin 3 milliGRAM(s) Oral at bedtime PRN Insomnia  ondansetron Injectable 4 milliGRAM(s) IV Push every 8 hours PRN Nausea and/or Vomiting      17 Aug 2021 08:08    146    |  115    |  53     ----------------------------<  114    3.5     |  24     |  1.56     Ca    8.8        17 Aug 2021 08:08  Phos  4.9       17 Aug 2021 08:08  Mg     2.2       17 Aug 2021 08:08    CBC Full  -  ( 17 Aug 2021 08:08 )  WBC Count : 15.72 K/uL  Hemoglobin : 14.3 g/dL  Hematocrit : 43.1 %  Platelet Count - Automated : 399 K/uL  Mean Cell Volume : 101.9 fl  Mean Cell Hemoglobin : 33.8 pg  Mean Cell Hemoglobin Concentration : 33.2 gm/dL  Auto Neutrophil # : x  Auto Lymphocyte # : x  Auto Monocyte # : x  Auto Eosinophil # : x  Auto Basophil # : x  Auto Neutrophil % : x  Auto Lymphocyte % : x  Auto Monocyte % : x  Auto Eosinophil % : x  Auto Basophil % : x        Assessment and Plan:     81 yo male with PMH of COPD, HTN, HLD, hemochromatosis admitted with:    #Sepsis, present on admission.  #b/l LE edema secondary to fluid overload with mild RLE cellulitis  #Right knee Swelling 2ndry to Gout and Pseudogout  - Doppler of LE  - Neg for DVT   - xray of the right knee - stable effusion  - Elevated ESR, CRP  - S/P IV lasix daily switch to PO lasix after colonoscopy  - ceftriaxone IV#6 change to ceftin 250 mg po q 12H #1 for 8 more days  - elevate extremities  - PT eval  - ID consult appreciated   - Ortho consult appreciated  - S/P Aspiration of R knee (8/11) yielded +MSU and CPP ; GS negative, CUltures still pending.  - S/P prednisone 40mg PO QD#5; continue Colchine    #Hypokalemia 2ndry to Acute on Chronic Diarrhea   - CDiff and GI PCR neg   - monitor K  - GI consult appreciated - If no improvement can check labs/urine to evaluate for neuroendocrine tumor  - lactose free intolerant   - Start immodium  - CT scan a/p with IV contrast Distended colon containing stool and fluid.  - FU labs for celiac disease   - GI FU appreciated - CT with stool throughout colon but no impaction, seems to be liquid stool on CT. Possibly related to imodium use. cont Imodium BID for now, this is OK with CT findings  - patient initially refused Colonoscopy, now wants to do it. FU with Dr Hahn    #Multiple renal and Bladder calculi  - outpatient      #COPD  - continue spiriva and advair    #HTN  - continue home meds    #HLD  - continue statin    #DVT prophylaxis  - lovenox    #Advance Directives  - FULL CODE    #Dispo - Patietn here with RLE Cellulitis and right knee gout; S/P IV rocephin and prednisone X 5 days; on colhicine; patient has a hx of chronic diarrhea and never had work up done; Stool studies neg and now on immodium BID; patient finally agreed to coloscopy FU with GI; resume lasix after colonoscopy      
Cheif complaints and Diagnosis: sepsis secondary RLE cellultiis/ gout/ chronic diahrrea    Subjective: no complaints      REVIEW OF SYSTEMS:    CONSTITUTIONAL: No weakness, fevers or chills  EYES/ENT: No visual changes;  No vertigo or throat pain   NECK: No pain or stiffness  RESPIRATORY: No cough, wheezing, hemoptysis; No shortness of breath  CARDIOVASCULAR: No chest pain or palpitations  GASTROINTESTINAL: No abdominal or epigastric pain. No nausea, vomiting, or hematemesis; No diarrhea or constipation. No melena or hematochezia.  GENITOURINARY: No dysuria, frequency or hematuria  NEUROLOGICAL: No numbness or weakness  SKIN: No itching, burning, rashes, or lesions   All other review of systems is negative unless indicated above      Vital Signs Last 24 Hrs  T(C): 36.7 (21 Aug 2021 15:51), Max: 36.7 (21 Aug 2021 15:51)  T(F): 98.1 (21 Aug 2021 15:51), Max: 98.1 (21 Aug 2021 15:51)  HR: 76 (21 Aug 2021 20:56) (76 - 86)  BP: 115/76 (21 Aug 2021 20:56) (102/72 - 144/74)  BP(mean): --  RR: 18 (21 Aug 2021 15:51) (18 - 18)  SpO2: 99% (21 Aug 2021 20:30) (98% - 99%)    HEENT:   pupils equal and reactive, EOMI, no oropharyngeal lesions, erythema, exudates, oral thrush    NECK:   supple, no carotid bruits, no palpable lymph nodes, no thyromegaly    CV:  +S1, +S2, regular, no murmurs or rubs    RESP:   lungs clear to auscultation bilaterally, no wheezing, rales, rhonchi, good air entry bilaterally    BREAST:  not examined    GI:  abdomen soft, non-tender, non-distended, normal BS, no bruits, no abdominal masses, no palpable masses    RECTAL:  not examined    :  not examined    MSK:   normal muscle tone, no atrophy, no rigidity, no contractions    EXT:   no clubbing, no cyanosis, no edema, no calf pain, swelling or erythema    VASCULAR:  pulses equal and symmetric in the upper and lower extremities    NEURO:  AAOX3, no focal neurological deficits, follows all commands, able to move extremities spontaneously    SKIN:  no ulcers, lesions or rashes    MEDICATIONS  (STANDING):  atorvastatin 10 milliGRAM(s) Oral at bedtime  buDESOnide    EC Capsule 9 milliGRAM(s) Oral at bedtime  budesonide 160 MICROgram(s)/formoterol 4.5 MICROgram(s) Inhaler 2 Puff(s) Inhalation two times a day  cefuroxime   Tablet 250 milliGRAM(s) Oral every 12 hours  diltiazem    milliGRAM(s) Oral daily  enoxaparin Injectable 40 milliGRAM(s) SubCutaneous daily  folic acid 1 milliGRAM(s) Oral daily  furosemide    Tablet 40 milliGRAM(s) Oral daily  lactobacillus acidophilus 1 Tablet(s) Oral two times a day  lisinopril 10 milliGRAM(s) Oral daily  metoprolol tartrate 25 milliGRAM(s) Oral two times a day  potassium chloride    Tablet ER 20 milliEquivalent(s) Oral daily  sodium chloride 0.9%. 1000 milliLiter(s) (50 mL/Hr) IV Continuous <Continuous>  tiotropium 18 MICROgram(s) Capsule 1 Capsule(s) Inhalation daily    MEDICATIONS  (PRN):  acetaminophen   Tablet .. 650 milliGRAM(s) Oral every 6 hours PRN Temp greater or equal to 38.5C (101.3F), Mild Pain (1 - 3)  aluminum hydroxide/magnesium hydroxide/simethicone Suspension 30 milliLiter(s) Oral every 4 hours PRN Dyspepsia  melatonin 3 milliGRAM(s) Oral at bedtime PRN Insomnia  ondansetron Injectable 4 milliGRAM(s) IV Push every 8 hours PRN Nausea and/or Vomiting            Hospital Course:    79 yo male with PMH of COPD, HTN, HLD, hemochromatosis admitted with:    #Sepsis, present on admission.  #b/l LE edema secondary to fluid overload with mild RLE cellulitis    - Doppler of LE  - Neg for DVT   - S/P IV lasix >> now on PO  - ceftriaxone IV#6 change to ceftin 250 mg po q 12H #1 for 8 more days (end date 8/23/21)  - elevate extremities  - PT eval  - ID consult appreciated   - Ortho consult appreciated        #Right knee Swelling 2ndry to Gout and Pseudogout  - S/P Aspiration of R knee (8/11) yielded +MSU and CPP ; GS negative, CUltures still pending.  - S/P prednisone 40mg PO QD#5;  - xray of the right knee - stable effusion  - Elevated ESR, CRP    #Hypokalemia 2ndry to Acute on Chronic Diarrhea   - CDiff and GI PCR neg   - GI FU appreciated - CT with stool throughout colon but no impaction, seems to be liquid stool on CT. Possibly related to imodium use. cont Imodium BID for now, this is OK with CT findings  -s/p colonoscopy >> outpatient f/u for biopsy results>> microscopic colitis >started entocort 9 mg QHS for microscopic colitis, continue until seen as outpatient  -Imodium prn      #acute gout  -s/p oral prednisone    #Multiple renal and Bladder calculi  - outpatient        Dispo: patient waiting bed at [facility]. status quo no change.       
GI    HPI:  feels somewhat better in general again today  less diarrhea  no abd pain  nai PO    ROS  As above  Otherwise unremarkable, all systems reviewed    PE:  Vital Signs Last 24 Hrs  T(C): 36.2 (13 Aug 2021 08:25), Max: 36.4 (12 Aug 2021 15:37)  T(F): 97.1 (13 Aug 2021 08:25), Max: 97.5 (12 Aug 2021 15:37)  HR: 73 (13 Aug 2021 08:25) (73 - 84)  BP: 120/73 (13 Aug 2021 08:25) (108/62 - 120/73)  BP(mean): --  RR: 18 (13 Aug 2021 08:25) (18 - 18)  SpO2: 96% (13 Aug 2021 08:25) (96% - 98%)    Constitutional: NAD, well-developed, A+Ox3  Anicteric   Respiratory: CTABL, breathing comfortably  Cardiovascular: S1 and S2, RRR  Gastrointestinal: +BS, soft, non tender, non distended, no mass  Extremities: warm, well perfused, trace edema, wrappings noted, chronic skin changes noted  Psychiatric: Normal mood, normal affect  Neuro: moves all extremities, grossly intact  Skin: No rashes or lesions    LABS:            stool tests negative (C diff and GI PCR)
GI    HPI:  had a few episodes of diarrhea after imodium held  no abd pain  nai PO    ROS  As above  Otherwise unremarkable, all systems reviewed    PE:  Vital Signs Last 24 Hrs  T(C): 36.6 (14 Aug 2021 16:04), Max: 36.6 (14 Aug 2021 16:04)  T(F): 97.9 (14 Aug 2021 16:04), Max: 97.9 (14 Aug 2021 16:04)  HR: 94 (14 Aug 2021 16:04) (70 - 94)  BP: 135/60 (14 Aug 2021 16:04) (111/74 - 143/81)  BP(mean): --  RR: 18 (14 Aug 2021 16:04) (18 - 18)  SpO2: 96% (14 Aug 2021 16:04) (96% - 97%)    Constitutional: NAD, well-developed, A+Ox3  Anicteric   Respiratory: CTABL, breathing comfortably  Cardiovascular: S1 and S2, RRR  Gastrointestinal: +BS, soft, non tender, non distended, no mass  Extremities: warm, well perfused, trace edema, wrappings noted, chronic skin changes noted  Psychiatric: Normal mood, normal affect  Neuro: moves all extremities, grossly intact  Skin: No rashes or lesions    LABS:            stool tests negative (C diff and GI PCR)    CT with liq and semisolid stool throughout colon but no obstructin
GI    HPI:  some improvement in diarrhea  no abd pain or bleeding  nai PO    ROS  As above  Otherwise unremarkable, all systems reviewed    PE:  Vital Signs Last 24 Hrs  T(C): 36.7 (19 Aug 2021 15:33), Max: 36.7 (19 Aug 2021 15:33)  T(F): 98 (19 Aug 2021 15:33), Max: 98 (19 Aug 2021 15:33)  HR: 59 (19 Aug 2021 15:33) (59 - 72)  BP: 126/89 (19 Aug 2021 15:33) (126/70 - 144/84)  BP(mean): --  RR: 18 (19 Aug 2021 15:33) (18 - 18)  SpO2: 100% (19 Aug 2021 15:33) (99% - 100%)    Constitutional: NAD, well-developed, A+Ox3  Anicteric   Respiratory: CTABL, breathing comfortably  Cardiovascular: S1 and S2, RRR  Gastrointestinal: +BS, soft, non tender, non distended, no mass  Extremities: warm, well perfused, trace edema, wrappings noted, chronic skin changes noted  Psychiatric: Normal mood, normal affect  Neuro: moves all extremities, grossly intact  Skin: No rashes or lesions    Surgical Final Report          Final Diagnosis  1. Colon biopsies (right):  - DIFFUSE INFLAMMATORY PROCESS CONSISTENT WITH LYMPHOCYTIC  ("MICROSCOPIC") COLITIS.  - RARE FOCUS OF INCREASED SUBEPITHELIAL COLLAGEN DEPOSITION  (COLLAGENOUS COLITIS).    2. Colon biopsies (left):  - DIFFUSE INFLAMMATORY PROCESS CONSISTENT WITH LYMPHOCYTIC  ("MICROSCOPIC")  COLITIS.    ---    Celiac genes+ but serologies are negative
HOSPITALIST PROGRESS NOTE:  SUBJECTIVE:  PCP:  Chief Complaint: Patient is a 80y old  Male who presents with a chief complaint of LE edema (09 Aug 2021 17:38)      HPI:  81 yo male with PMH of COPD, HTN, HLD, hemochromatosis presents to ED with b/l LE edema. Pt has been non compliant with medications. States has ran out of some medications but has not followed up with his PCP. He is on lasix for LE edema but thinks he ran out. states over the last few weeks he noted worsening LE edema and a few days ago noted weeping from RLE. No fevers. Has difficulty ambulating secondary to edema. Pt lives by himself at home. Pt denies SOB or CP. No abd pain, N/V/D.  (09 Aug 2021 17:38)    8/10: ABove reviewed; Patient states his diarrhea is chronic? He denies any hx of gout. NO hs of aspiration of the right knee  8/11: S/P arthrocentesis of right leg yesterday; continues to have diarrhea   8/12: Patient has no complaints; less diarrhea after starting immodium; K is better; Patient refusing colonoscopy and will think about the CT abd  8/13:  Patient agreeing to CT abd with IV contrast and wants rehab; less diarrhea   8/14:  Patient had a large bowel movement last night; No other complaints.     Allergies:  No Known Allergies    REVIEW OF SYSTEMS:  See HPI. All other review of systems is negative unless indicated above.     OBJECTIVE  Physical Exam:  Vital Signs Last 24 Hrs  T(C): 35.6 (14 Aug 2021 08:44), Max: 36.4 (13 Aug 2021 15:54)  T(F): 96 (14 Aug 2021 08:44), Max: 97.5 (13 Aug 2021 15:54)  HR: 70 (14 Aug 2021 08:44) (70 - 78)  BP: 143/81 (14 Aug 2021 08:44) (107/70 - 143/81)  BP(mean): --  RR: 18 (14 Aug 2021 08:44) (18 - 18)  SpO2: 96% (14 Aug 2021 08:44) (96% - 100%)    Constitutional: NAD, awake, disheveled   Neurological: no focal deficits  HEENT: PERRLA, EOMI, MMM  Neck: Soft and supple, No LAD, No JVD  Respiratory: Breath sounds are clear bilaterally, No wheezing, rales or rhonchi  Cardiovascular: S1 and S2, regular rate and rhythm; no Murmurs, gallops or rubs  Gastrointestinal: Bowel Sounds present, soft, nontender, nondistended, no guarding, no rebound tenderness  Back: No CVA tenderness   Extremities: +B/: Peripheral edema r>L Right knee swollen and warm to touch - covered with ace bandage   Vascular: 2+ peripheral pulses  Musculoskeletal: 5/5 strength b/l upper and lower extremities  Skin: No rashes  Breast: Deferred  Rectal: Deferred    MEDICATIONS  (STANDING):  atorvastatin 10 milliGRAM(s) Oral at bedtime  budesonide 160 MICROgram(s)/formoterol 4.5 MICROgram(s) Inhaler 2 Puff(s) Inhalation two times a day  cefTRIAXone Injectable.      cefTRIAXone Injectable. 1000 milliGRAM(s) IV Push every 24 hours  diltiazem    milliGRAM(s) Oral daily  enoxaparin Injectable 40 milliGRAM(s) SubCutaneous daily  folic acid 1 milliGRAM(s) Oral daily  furosemide   Injectable 40 milliGRAM(s) IV Push daily  lisinopril 10 milliGRAM(s) Oral daily  metoprolol tartrate 25 milliGRAM(s) Oral two times a day  potassium chloride    Tablet ER 20 milliEquivalent(s) Oral daily  tiotropium 18 MICROgram(s) Capsule 1 Capsule(s) Inhalation daily    Lab Results:  CBC  CBC Full  -  ( 14 Aug 2021 09:00 )  WBC Count : 11.01 K/uL  RBC Count : 3.74 M/uL  Hemoglobin : 12.5 g/dL  Hematocrit : 36.9 %  Platelet Count - Automated : 342 K/uL  Mean Cell Volume : 98.7 fl  Mean Cell Hemoglobin : 33.4 pg  Mean Cell Hemoglobin Concentration : 33.9 gm/dL  Auto Neutrophil # : x  Auto Lymphocyte # : x  Auto Monocyte # : x  Auto Eosinophil # : x  Auto Basophil # : x  Auto Neutrophil % : x  Auto Lymphocyte % : x  Auto Monocyte % : x  Auto Eosinophil % : x  Auto Basophil % : x    .		Differential:	[] Automated		[] Manual  Chemistry                        12.5   11.01 )-----------( 342      ( 14 Aug 2021 09:00 )             36.9     08-14    140  |  107  |  38<H>  ----------------------------<  123<H>  3.9   |  28  |  1.04    Ca    8.3<L>      14 Aug 2021 09:00  Phos  2.8     08-14  Mg     2.0     08-14    MICROBIOLOGY/CULTURES:  Culture Results:   GI PCR Results: NOT detected  *******Please Note:*******  GI panel PCR evaluates for:  Campylobacter, Plesiomonas shigelloides, Salmonella,  Vibrio, Yersinia enterocolitica, Enteroaggregative  Escherichia coli (EAEC), Enteropathogenic E.coli (EPEC),  Enterotoxigenic E. coli (ETEC) lt/st, Shiga-like  toxin-producing E. coli (STEC) stx1/stx2,  Shigella/ Enteroinvasive E. coli (EIEC), Cryptosporidium,  Cyclospora cayetanensis, Entamoeba histolytica,  Giardia lamblia, Adenovirus F 40/41, Astrovirus,  Norovirus GI/GII, Rotavirus A, Sapovirus (08-10 @ 21:28)  Culture Results:   No growth to date. (08-09 @ 14:46)    RADIOLOGY/EKG:    x< from: Xray Chest 1 View- PORTABLE-Urgent (08.09.21 @ 14:57) >    IMPRESSION: Linear atelectases presently seen at right base.    < end of copied text >      < from: US Duplex Venous Lower Ext Complete, Bilateral (08.09.21 @ 19:33) >    IMPRESSION:  No evidence of deep venous thrombosis in either lower extremity.      < end of copied text >    x< from: Xray Knee 1 or 2 Views, Right (08.10.21 @ 17:00) >    IMPRESSION: Sizable knee effusion. Degeneration as above.      < end of copied text >    ct< from: CT Abdomen and Pelvis w/ IV Cont (08.13.21 @ 18:23) >    IMPRESSION: Distended colon containing stool and fluid.    Left-sided renal calculi as above.    Multiple calculi in the bladder measuring up to 4 cm.        < end of copied text >      
Cheif complaints and Diagnosis: sepsis secondary RLE cellulitis/ gout    Subjective: no complaints      REVIEW OF SYSTEMS:    CONSTITUTIONAL: No weakness, fevers or chills  EYES/ENT: No visual changes;  No vertigo or throat pain   NECK: No pain or stiffness  RESPIRATORY: No cough, wheezing, hemoptysis; No shortness of breath  CARDIOVASCULAR: No chest pain or palpitations  GASTROINTESTINAL: No abdominal or epigastric pain. No nausea, vomiting, or hematemesis; No diarrhea or constipation. No melena or hematochezia.  GENITOURINARY: No dysuria, frequency or hematuria  NEUROLOGICAL: No numbness or weakness  SKIN: No itching, burning, rashes, or lesions   All other review of systems is negative unless indicated above      Vital Signs Last 24 Hrs  T(C): 36.4 (23 Aug 2021 15:42), Max: 36.7 (23 Aug 2021 09:23)  T(F): 97.6 (23 Aug 2021 15:42), Max: 98 (23 Aug 2021 09:23)  HR: 81 (23 Aug 2021 15:42) (63 - 81)  BP: 121/71 (23 Aug 2021 15:42) (90/64 - 126/82)  BP(mean): 93 (22 Aug 2021 15:52) (93 - 93)  RR: 18 (23 Aug 2021 15:42) (18 - 18)  SpO2: 98% (23 Aug 2021 15:42) (98% - 100%)    HEENT:   pupils equal and reactive, EOMI, no oropharyngeal lesions, erythema, exudates, oral thrush    NECK:   supple, no carotid bruits, no palpable lymph nodes, no thyromegaly    CV:  +S1, +S2, regular, no murmurs or rubs    RESP:   lungs clear to auscultation bilaterally, no wheezing, rales, rhonchi, good air entry bilaterally    BREAST:  not examined    GI:  abdomen soft, non-tender, non-distended, normal BS, no bruits, no abdominal masses, no palpable masses    RECTAL:  not examined    :  not examined    MSK:   normal muscle tone, no atrophy, no rigidity, no contractions    EXT:   no clubbing, no cyanosis, no edema, no calf pain, swelling or erythema    VASCULAR:  pulses equal and symmetric in the upper and lower extremities    NEURO:  AAOX3, no focal neurological deficits, follows all commands, able to move extremities spontaneously    SKIN:  no ulcers, lesions or rashes    MEDICATIONS  (STANDING):  atorvastatin 10 milliGRAM(s) Oral at bedtime  buDESOnide    EC Capsule 9 milliGRAM(s) Oral at bedtime  budesonide 160 MICROgram(s)/formoterol 4.5 MICROgram(s) Inhaler 2 Puff(s) Inhalation two times a day  diltiazem    milliGRAM(s) Oral daily  enoxaparin Injectable 40 milliGRAM(s) SubCutaneous daily  folic acid 1 milliGRAM(s) Oral daily  furosemide    Tablet 40 milliGRAM(s) Oral daily  lactobacillus acidophilus 1 Tablet(s) Oral two times a day  lisinopril 10 milliGRAM(s) Oral daily  metoprolol tartrate 25 milliGRAM(s) Oral two times a day  potassium chloride    Tablet ER 20 milliEquivalent(s) Oral daily  sodium chloride 0.9%. 1000 milliLiter(s) (50 mL/Hr) IV Continuous <Continuous>  tiotropium 18 MICROgram(s) Capsule 1 Capsule(s) Inhalation daily    MEDICATIONS  (PRN):  acetaminophen   Tablet .. 650 milliGRAM(s) Oral every 6 hours PRN Temp greater or equal to 38.5C (101.3F), Mild Pain (1 - 3)  aluminum hydroxide/magnesium hydroxide/simethicone Suspension 30 milliLiter(s) Oral every 4 hours PRN Dyspepsia  melatonin 3 milliGRAM(s) Oral at bedtime PRN Insomnia  ondansetron Injectable 4 milliGRAM(s) IV Push every 8 hours PRN Nausea and/or Vomiting          Hospital Course:    81 yo male with PMH of COPD, HTN, HLD, hemochromatosis admitted with:    #Sepsis, present on admission.  #b/l LE edema secondary to fluid overload with mild RLE cellulitis    - Doppler of LE  - Neg for DVT   - S/P IV lasix >> now on PO  - ceftriaxone IV#6 change to ceftin 250 mg po q 12H #1 for 8 more days (end date 8/23/21)  - elevate extremities  - PT eval  - ID consult appreciated   - Ortho consult appreciated        #Right knee Swelling 2ndry to Gout and Pseudogout  - S/P Aspiration of R knee (8/11) yielded +MSU and CPP ; GS negative, CUltures still pending.  - S/P prednisone 40mg PO QD#5;  - xray of the right knee - stable effusion  - Elevated ESR, CRP    #Hypokalemia 2ndry to Acute on Chronic Diarrhea   - CDiff and GI PCR neg   - GI FU appreciated - CT with stool throughout colon but no impaction, seems to be liquid stool on CT. Possibly related to imodium use. cont Imodium BID for now, this is OK with CT findings  -s/p colonoscopy >> outpatient f/u for biopsy results>> microscopic colitis >started entocort 9 mg QHS for microscopic colitis, continue until seen as outpatient  -Imodium prn      #acute gout  -s/p oral prednisone    #Multiple renal and Bladder calculi  - outpatient        Dispo: patient waiting bed at [facility]. for DC today      
Date of service: 08-15-21 @ 11:53    Patient doing well, no complaints, afebrile, will ambulate with physical therapy        ROS: no fever or chills; denies dizziness, no HA, no SOB or cough, no abdominal pain, no diarrhea or constipation; no dysuria, no urinary frequency, no legs pain, no rashes    MEDICATIONS  (STANDING):  atorvastatin 10 milliGRAM(s) Oral at bedtime  budesonide 160 MICROgram(s)/formoterol 4.5 MICROgram(s) Inhaler 2 Puff(s) Inhalation two times a day  cefTRIAXone Injectable.      cefTRIAXone Injectable. 1000 milliGRAM(s) IV Push every 24 hours  colchicine 0.6 milliGRAM(s) Oral every 12 hours  diltiazem    milliGRAM(s) Oral daily  enoxaparin Injectable 40 milliGRAM(s) SubCutaneous daily  folic acid 1 milliGRAM(s) Oral daily  lactobacillus acidophilus 1 Tablet(s) Oral two times a day  lisinopril 10 milliGRAM(s) Oral daily  loperamide 2 milliGRAM(s) Oral two times a day  metoprolol tartrate 25 milliGRAM(s) Oral two times a day  pantoprazole    Tablet 40 milliGRAM(s) Oral daily  potassium chloride    Tablet ER 20 milliEquivalent(s) Oral daily  predniSONE   Tablet 40 milliGRAM(s) Oral daily  sodium chloride 0.9%. 1000 milliLiter(s) (50 mL/Hr) IV Continuous <Continuous>  tiotropium 18 MICROgram(s) Capsule 1 Capsule(s) Inhalation daily    MEDICATIONS  (PRN):  acetaminophen   Tablet .. 650 milliGRAM(s) Oral every 6 hours PRN Temp greater or equal to 38.5C (101.3F), Mild Pain (1 - 3)  aluminum hydroxide/magnesium hydroxide/simethicone Suspension 30 milliLiter(s) Oral every 4 hours PRN Dyspepsia  melatonin 3 milliGRAM(s) Oral at bedtime PRN Insomnia  ondansetron Injectable 4 milliGRAM(s) IV Push every 8 hours PRN Nausea and/or Vomiting      Vital Signs Last 24 Hrs  T(C): 36.3 (15 Aug 2021 08:28), Max: 36.6 (14 Aug 2021 16:04)  T(F): 97.4 (15 Aug 2021 08:28), Max: 97.9 (14 Aug 2021 16:04)  HR: 76 (15 Aug 2021 08:43) (68 - 94)  BP: 153/85 (15 Aug 2021 08:28) (116/85 - 153/85)  BP(mean): --  RR: 18 (15 Aug 2021 08:28) (18 - 18)  SpO2: 97% (15 Aug 2021 08:28) (96% - 97%)        Physical Exam:          Constitutional: frail looking  HEENT: NC/AT, EOMI, PERRLA, conjunctivae clear; ears and nose atraumatic; pharynx clear  Neck: supple; thyroid not palpable  Back: no tenderness  Respiratory: respiratory effort normal; clear to auscultation  Cardiovascular: S1S2 regular, no murmurs  Abdomen: soft, not tender, not distended, positive BS; no liver or spleen organomegaly  Genitourinary: no suprapubic tenderness  Musculoskeletal: no muscle tenderness, right knee with dressing in place  Neurological/ Psychiatric: AxOx3, judgement and insight normal;  moving all extremities  Skin: no rashes; no palpable lesions    Labs: all available labs reviewed                     Labs:                        13.0   11.57 )-----------( 371      ( 15 Aug 2021 09:32 )             38.4     08-15    142  |  108  |  36<H>  ----------------------------<  133<H>  4.3   |  31  |  1.02    Ca    8.4<L>      15 Aug 2021 09:32  Phos  2.6     08-15  Mg     2.0     08-15             Cultures:       GI PCR Panel, Stool (collected 08-10-21 @ 21:28)  Source: .Stool Feces  Final Report (08-11-21 @ 07:10):    GI PCR Results: NOT detected    *******Please Note:*******    GI panel PCR evaluates for:    Campylobacter, Plesiomonas shigelloides, Salmonella,    Vibrio, Yersinia enterocolitica, Enteroaggregative    Escherichia coli (EAEC), Enteropathogenic E.coli (EPEC),    Enterotoxigenic E. coli (ETEC) lt/st, Shiga-like    toxin-producing E. coli (STEC) stx1/stx2,    Shigella/ Enteroinvasive E. coli (EIEC), Cryptosporidium,    Cyclospora cayetanensis, Entamoeba histolytica,    Giardia lamblia, Adenovirus F 40/41, Astrovirus,    Norovirus GI/GII, Rotavirus A, Sapovirus    Culture - Blood (collected 08-09-21 @ 14:46)  Source: .Blood None  Final Report (08-14-21 @ 20:01):    No Growth Final      C-Reactive Protein, Serum: 236 mg/L (08-11-21 @ 09:10)  C-Reactive Protein, Serum: 226 mg/L (08-10-21 @ 17:36)          Crystals, Synovial Fluid (08.10.21 @ 17:36)    Synovial Crystals Clarity: Turbid    Synovial Crystals Tube: Red Top Tube    Synovial Crystals Color: Yellow    Synovial Crystals Id: Crystals Present Many extra and intra cellular Calcium Pyrophosphate dihydrate crystals  and many intra and extra Monosodium Urate crystals seen under compensated  polarized light microscopy    Cell Count, Body Fluid (08.10.21 @ 17:36)    Monocyte/Macrophage Count - Body Fluid: 1 %    Fluid Segmented Granulocytes: 95 %    Fluid Type: Synovial fluid    Body Fluid Appearance: Cloudy    BF Lymphocytes: 4 %    Tube Type: Lavender    Color - Body Fluid: Brown    Total Nucleated Cell Count, Body Fluid: 8333: Peritoneal/Pleural/ Pericardial  Body Fluid Types            Total Nucleated cells: <500/uL            Neutrophils: <25%          Synovial Body Fluid Type           Total Nucleated cells: <150/uL           Neutrophils: <25%           Lymphocytes: <75%           Moncytes/Macrophages: </=70%  Please note reference range updated effective Nov 26, 2019 /uL    Total RBC Count: 32713 /uL                    Culture Results:   GI PCR Results: NOT detected  *******Please Note:*******  GI panel PCR evaluates for:  Campylobacter, Plesiomonas shigelloides, Salmonella,  Vibrio, Yersinia enterocolitica, Enteroaggregative  Escherichia coli (EAEC), Enteropathogenic E.coli (EPEC),  Enterotoxigenic E. coli (ETEC) lt/st, Shiga-like  toxin-producing E. coli (STEC) stx1/stx2,  Shigella/ Enteroinvasive E. coli (EIEC), Cryptosporidium,  Cyclospora cayetanensis, Entamoeba histolytica,  Giardia lamblia, Adenovirus F 40/41, Astrovirus,  Norovirus GI/GII, Rotavirus A, Sapovirus (08-10 @ 21:28)    Radiology: all available radiological tests reviewed    Advanced directives addressed: full resuscitation
GI    HPI:  s/p colonoscopy yest  no abd pain or bleeding  diarrhea persists  nai PO    ROS  As above  Otherwise unremarkable, all systems reviewed    PE:  Vital Signs Last 24 Hrs  T(C): 36.4 (18 Aug 2021 08:38), Max: 36.4 (18 Aug 2021 08:38)  T(F): 97.6 (18 Aug 2021 08:38), Max: 97.6 (18 Aug 2021 08:38)  HR: 63 (18 Aug 2021 08:38) (45 - 63)  BP: 142/79 (18 Aug 2021 08:38) (102/67 - 142/79)  BP(mean): --  RR: 17 (18 Aug 2021 08:38) (17 - 18)  SpO2: 97% (18 Aug 2021 08:38) (97% - 98%)  Constitutional: NAD, well-developed, A+Ox3  Anicteric   Respiratory: CTABL, breathing comfortably  Cardiovascular: S1 and S2, RRR  Gastrointestinal: +BS, soft, non tender, non distended, no mass  Extremities: warm, well perfused, trace edema, wrappings noted, chronic skin changes noted  Psychiatric: Normal mood, normal affect  Neuro: moves all extremities, grossly intact  Skin: No rashes or lesions    LABS:            stool tests negative (C diff and GI PCR)    CT with liq and semisolid stool throughout colon but no obstruction
HOSPITALIST PROGRESS NOTE:  SUBJECTIVE:  PCP:  Chief Complaint: Patient is a 80y old  Male who presents with a chief complaint of LE edema (09 Aug 2021 17:38)      HPI:  81 yo male with PMH of COPD, HTN, HLD, hemochromatosis presents to ED with b/l LE edema. Pt has been non compliant with medications. States has ran out of some medications but has not followed up with his PCP. He is on lasix for LE edema but thinks he ran out. states over the last few weeks he noted worsening LE edema and a few days ago noted weeping from RLE. No fevers. Has difficulty ambulating secondary to edema. Pt lives by himself at home. Pt denies SOB or CP. No abd pain, N/V/D.  (09 Aug 2021 17:38)    8/10: ABove reviewed; Patient states his diarrhea is chronic? He denies any hx of gout. NO hs of aspiration of the right knee  8/11: S/P arthrocentesis of right leg yesterday; continues to have diarrhea   8/12: Patient has no complaints; less diarrhea after starting immodium; K is better; Patient refusing colonoscopy and will think about the CT abd    Allergies:  No Known Allergies    REVIEW OF SYSTEMS:  See HPI. All other review of systems is negative unless indicated above.     OBJECTIVE  Physical Exam:  Vital Signs Last 24 Hrs  T(C): 36.6 (12 Aug 2021 08:13), Max: 36.7 (11 Aug 2021 15:15)  T(F): 97.8 (12 Aug 2021 08:13), Max: 98.1 (11 Aug 2021 15:15)  HR: 73 (12 Aug 2021 08:13) (72 - 82)  BP: 120/79 (12 Aug 2021 08:13) (98/67 - 120/79)  BP(mean): --  RR: 18 (12 Aug 2021 08:13) (16 - 18)  SpO2: 98% (12 Aug 2021 08:13) (97% - 100%)      Constitutional: NAD, awake, disheveled   Neurological: no focal deficits  HEENT: PERRLA, EOMI, MMM  Neck: Soft and supple, No LAD, No JVD  Respiratory: Breath sounds are clear bilaterally, No wheezing, rales or rhonchi  Cardiovascular: S1 and S2, regular rate and rhythm; no Murmurs, gallops or rubs  Gastrointestinal: Bowel Sounds present, soft, nontender, nondistended, no guarding, no rebound tenderness  Back: No CVA tenderness   Extremities: +B/: Peripheral edema r>L Right knee swollen and warm to touch - covered with ace bandage   Vascular: 2+ peripheral pulses  Musculoskeletal: 5/5 strength b/l upper and lower extremities  Skin: No rashes  Breast: Deferred  Rectal: Deferred    MEDICATIONS  (STANDING):  atorvastatin 10 milliGRAM(s) Oral at bedtime  budesonide 160 MICROgram(s)/formoterol 4.5 MICROgram(s) Inhaler 2 Puff(s) Inhalation two times a day  cefTRIAXone Injectable.      cefTRIAXone Injectable. 1000 milliGRAM(s) IV Push every 24 hours  diltiazem    milliGRAM(s) Oral daily  enoxaparin Injectable 40 milliGRAM(s) SubCutaneous daily  folic acid 1 milliGRAM(s) Oral daily  furosemide   Injectable 40 milliGRAM(s) IV Push daily  lisinopril 10 milliGRAM(s) Oral daily  metoprolol tartrate 25 milliGRAM(s) Oral two times a day  potassium chloride    Tablet ER 20 milliEquivalent(s) Oral daily  tiotropium 18 MICROgram(s) Capsule 1 Capsule(s) Inhalation daily    Lab Results:  CBC  CBC Full  -  ( 12 Aug 2021 09:16 )  WBC Count : 14.75 K/uL  RBC Count : 4.04 M/uL  Hemoglobin : 13.7 g/dL  Hematocrit : 40.0 %  Platelet Count - Automated : 279 K/uL  Mean Cell Volume : 99.0 fl  Mean Cell Hemoglobin : 33.9 pg  Mean Cell Hemoglobin Concentration : 34.3 gm/dL  Auto Neutrophil # : x  Auto Lymphocyte # : x  Auto Monocyte # : x  Auto Eosinophil # : x  Auto Basophil # : x  Auto Neutrophil % : x  Auto Lymphocyte % : x  Auto Monocyte % : x  Auto Eosinophil % : x  Auto Basophil % : x    .		Differential:	[] Automated		[] Manual  Chemistry                        13.7   14.75 )-----------( 279      ( 12 Aug 2021 09:16 )             40.0     08-12    141  |  105  |  33<H>  ----------------------------<  199<H>  3.1<L>   |  30  |  1.13    Ca    8.7      12 Aug 2021 09:16  Phos  2.6     08-12  Mg     1.9     08-12    TPro  6.0  /  Alb  2.1<L>  /  TBili  1.7<H>  /  DBili  x   /  AST  18  /  ALT  17  /  AlkPhos  82  08-11    LIVER FUNCTIONS - ( 11 Aug 2021 09:10 )  Alb: 2.1 g/dL / Pro: 6.0 gm/dL / ALK PHOS: 82 U/L / ALT: 17 U/L / AST: 18 U/L / GGT: x             MICROBIOLOGY/CULTURES:  Culture Results:   GI PCR Results: NOT detected  *******Please Note:*******  GI panel PCR evaluates for:  Campylobacter, Plesiomonas shigelloides, Salmonella,  Vibrio, Yersinia enterocolitica, Enteroaggregative  Escherichia coli (EAEC), Enteropathogenic E.coli (EPEC),  Enterotoxigenic E. coli (ETEC) lt/st, Shiga-like  toxin-producing E. coli (STEC) stx1/stx2,  Shigella/ Enteroinvasive E. coli (EIEC), Cryptosporidium,  Cyclospora cayetanensis, Entamoeba histolytica,  Giardia lamblia, Adenovirus F 40/41, Astrovirus,  Norovirus GI/GII, Rotavirus A, Sapovirus (08-10 @ 21:28)  Culture Results:   No growth to date. (08-09 @ 14:46)    RADIOLOGY/EKG:    x< from: Xray Chest 1 View- PORTABLE-Urgent (08.09.21 @ 14:57) >    IMPRESSION: Linear atelectases presently seen at right base.    < end of copied text >      < from: US Duplex Venous Lower Ext Complete, Bilateral (08.09.21 @ 19:33) >    IMPRESSION:  No evidence of deep venous thrombosis in either lower extremity.      < end of copied text >    x< from: Xray Knee 1 or 2 Views, Right (08.10.21 @ 17:00) >    IMPRESSION: Sizable knee effusion. Degeneration as above.      < end of copied text >      
HOSPITALIST PROGRESS NOTE:  SUBJECTIVE:  PCP:  Chief Complaint: Patient is a 80y old  Male who presents with a chief complaint of LE edema (09 Aug 2021 17:38)      HPI:  81 yo male with PMH of COPD, HTN, HLD, hemochromatosis presents to ED with b/l LE edema. Pt has been non compliant with medications. States has ran out of some medications but has not followed up with his PCP. He is on lasix for LE edema but thinks he ran out. states over the last few weeks he noted worsening LE edema and a few days ago noted weeping from RLE. No fevers. Has difficulty ambulating secondary to edema. Pt lives by himself at home. Pt denies SOB or CP. No abd pain, N/V/D.  (09 Aug 2021 17:38)    8/10: ABove reviewed; Patient states his diarrhea is chronic? He denies any hx of gout. NO hs of aspiration of the right knee  8/11: S/P arthrocentesis of right leg yesterday; continues to have diarrhea   8/12: Patient has no complaints; less diarrhea after starting immodium; K is better; Patient refusing colonoscopy and will think about the CT abd  8/13:  Patient agreeing to CT abd with IV contrast and wants rehab; less diarrhea     Allergies:  No Known Allergies    REVIEW OF SYSTEMS:  See HPI. All other review of systems is negative unless indicated above.     OBJECTIVE  Physical Exam:  Vital Signs Last 24 Hrs  T(C): 36.2 (13 Aug 2021 08:25), Max: 36.4 (12 Aug 2021 15:37)  T(F): 97.1 (13 Aug 2021 08:25), Max: 97.5 (12 Aug 2021 15:37)  HR: 73 (13 Aug 2021 08:25) (73 - 84)  BP: 120/73 (13 Aug 2021 08:25) (108/62 - 120/73)  BP(mean): --  RR: 18 (13 Aug 2021 08:25) (18 - 18)  SpO2: 96% (13 Aug 2021 08:25) (96% - 98%)    Constitutional: NAD, awake, disheveled   Neurological: no focal deficits  HEENT: PERRLA, EOMI, MMM  Neck: Soft and supple, No LAD, No JVD  Respiratory: Breath sounds are clear bilaterally, No wheezing, rales or rhonchi  Cardiovascular: S1 and S2, regular rate and rhythm; no Murmurs, gallops or rubs  Gastrointestinal: Bowel Sounds present, soft, nontender, nondistended, no guarding, no rebound tenderness  Back: No CVA tenderness   Extremities: +B/: Peripheral edema r>L Right knee swollen and warm to touch - covered with ace bandage   Vascular: 2+ peripheral pulses  Musculoskeletal: 5/5 strength b/l upper and lower extremities  Skin: No rashes  Breast: Deferred  Rectal: Deferred    MEDICATIONS  (STANDING):  atorvastatin 10 milliGRAM(s) Oral at bedtime  budesonide 160 MICROgram(s)/formoterol 4.5 MICROgram(s) Inhaler 2 Puff(s) Inhalation two times a day  cefTRIAXone Injectable.      cefTRIAXone Injectable. 1000 milliGRAM(s) IV Push every 24 hours  diltiazem    milliGRAM(s) Oral daily  enoxaparin Injectable 40 milliGRAM(s) SubCutaneous daily  folic acid 1 milliGRAM(s) Oral daily  furosemide   Injectable 40 milliGRAM(s) IV Push daily  lisinopril 10 milliGRAM(s) Oral daily  metoprolol tartrate 25 milliGRAM(s) Oral two times a day  potassium chloride    Tablet ER 20 milliEquivalent(s) Oral daily  tiotropium 18 MICROgram(s) Capsule 1 Capsule(s) Inhalation daily    Lab Results:  CBC  CBC Full  -  ( 13 Aug 2021 07:47 )  WBC Count : 13.50 K/uL  RBC Count : 3.84 M/uL  Hemoglobin : 12.9 g/dL  Hematocrit : 38.2 %  Platelet Count - Automated : 343 K/uL  Mean Cell Volume : 99.5 fl  Mean Cell Hemoglobin : 33.6 pg  Mean Cell Hemoglobin Concentration : 33.8 gm/dL  Auto Neutrophil # : x  Auto Lymphocyte # : x  Auto Monocyte # : x  Auto Eosinophil # : x  Auto Basophil # : x  Auto Neutrophil % : x  Auto Lymphocyte % : x  Auto Monocyte % : x  Auto Eosinophil % : x  Auto Basophil % : x    .		Differential:	[] Automated		[] Manual  Chemistry                        12.9   13.50 )-----------( 343      ( 13 Aug 2021 07:47 )             38.2     08-13    139  |  106  |  39<H>  ----------------------------<  123<H>  3.6   |  30  |  1.08    Ca    8.7      13 Aug 2021 07:47  Phos  2.9     08-13  Mg     2.2     08-13        MICROBIOLOGY/CULTURES:  Culture Results:   GI PCR Results: NOT detected  *******Please Note:*******  GI panel PCR evaluates for:  Campylobacter, Plesiomonas shigelloides, Salmonella,  Vibrio, Yersinia enterocolitica, Enteroaggregative  Escherichia coli (EAEC), Enteropathogenic E.coli (EPEC),  Enterotoxigenic E. coli (ETEC) lt/st, Shiga-like  toxin-producing E. coli (STEC) stx1/stx2,  Shigella/ Enteroinvasive E. coli (EIEC), Cryptosporidium,  Cyclospora cayetanensis, Entamoeba histolytica,  Giardia lamblia, Adenovirus F 40/41, Astrovirus,  Norovirus GI/GII, Rotavirus A, Sapovirus (08-10 @ 21:28)  Culture Results:   No growth to date. (08-09 @ 14:46)    RADIOLOGY/EKG:    x< from: Xray Chest 1 View- PORTABLE-Urgent (08.09.21 @ 14:57) >    IMPRESSION: Linear atelectases presently seen at right base.    < end of copied text >      < from: US Duplex Venous Lower Ext Complete, Bilateral (08.09.21 @ 19:33) >    IMPRESSION:  No evidence of deep venous thrombosis in either lower extremity.      < end of copied text >    x< from: Xray Knee 1 or 2 Views, Right (08.10.21 @ 17:00) >    IMPRESSION: Sizable knee effusion. Degeneration as above.      < end of copied text >      
HOSPITALIST PROGRESS NOTE:  SUBJECTIVE:  PCP:  Chief Complaint: Patient is a 80y old  Male who presents with a chief complaint of LE edema (09 Aug 2021 17:38)      HPI:  81 yo male with PMH of COPD, HTN, HLD, hemochromatosis presents to ED with b/l LE edema. Pt has been non compliant with medications. States has ran out of some medications but has not followed up with his PCP. He is on lasix for LE edema but thinks he ran out. states over the last few weeks he noted worsening LE edema and a few days ago noted weeping from RLE. No fevers. Has difficulty ambulating secondary to edema. Pt lives by himself at home. Pt denies SOB or CP. No abd pain, N/V/D.  (09 Aug 2021 17:38)    8/10: ABove reviewed; Patient states his diarrhea is chronic? He denies any hx of gout. NO hs of aspiration of the right knee  8/11: S/P arthrocentesis of right leg yesterday; continues to have diarrhea   8/12: Patient has no complaints; less diarrhea after starting immodium; K is better; Patient refusing colonoscopy and will think about the CT abd  8/13:  Patient agreeing to CT abd with IV contrast and wants rehab; less diarrhea   8/14:  Patient had a large bowel movement last night; No other complaints.   8/15:  PAtient has no complaints; thinking about possible colonoscopy; hes going to discuss it with his daughter   8/16:  patient is now agreeable for colonoscopy;     Allergies:  No Known Allergies    REVIEW OF SYSTEMS:  See HPI. All other review of systems is negative unless indicated above.     OBJECTIVE  Physical Exam:  Vital Signs Last 24 Hrs  T(C): 36.7 (16 Aug 2021 09:00), Max: 36.7 (16 Aug 2021 09:00)  T(F): 98.1 (16 Aug 2021 09:00), Max: 98.1 (16 Aug 2021 09:00)  HR: 72 (16 Aug 2021 09:00) (58 - 80)  BP: 153/90 (16 Aug 2021 09:00) (114/54 - 153/90)  BP(mean): --  RR: 18 (15 Aug 2021 23:28) (18 - 19)  SpO2: 99% (16 Aug 2021 09:00) (98% - 99%)    Constitutional: NAD, awake, disheveled   Neurological: no focal deficits  HEENT: PERRLA, EOMI, MMM  Neck: Soft and supple, No LAD, No JVD  Respiratory: Breath sounds are clear bilaterally, No wheezing, rales or rhonchi  Cardiovascular: S1 and S2, regular rate and rhythm; no Murmurs, gallops or rubs  Gastrointestinal: Bowel Sounds present, soft, nontender, nondistended, no guarding, no rebound tenderness  Back: No CVA tenderness   Extremities: +B/: Peripheral edema r>L Right knee swollen and warm to touch - covered with ace bandage   Vascular: 2+ peripheral pulses  Musculoskeletal: 5/5 strength b/l upper and lower extremities  Skin: No rashes  Breast: Deferred  Rectal: Deferred    MEDICATIONS  (STANDING):  atorvastatin 10 milliGRAM(s) Oral at bedtime  budesonide 160 MICROgram(s)/formoterol 4.5 MICROgram(s) Inhaler 2 Puff(s) Inhalation two times a day  cefTRIAXone Injectable.      cefTRIAXone Injectable. 1000 milliGRAM(s) IV Push every 24 hours  diltiazem    milliGRAM(s) Oral daily  enoxaparin Injectable 40 milliGRAM(s) SubCutaneous daily  folic acid 1 milliGRAM(s) Oral daily  furosemide   Injectable 40 milliGRAM(s) IV Push daily  lisinopril 10 milliGRAM(s) Oral daily  metoprolol tartrate 25 milliGRAM(s) Oral two times a day  potassium chloride    Tablet ER 20 milliEquivalent(s) Oral daily  tiotropium 18 MICROgram(s) Capsule 1 Capsule(s) Inhalation daily    Lab Results:  CBC  CBC Full  -  ( 15 Aug 2021 09:32 )  WBC Count : 11.57 K/uL  RBC Count : 3.82 M/uL  Hemoglobin : 13.0 g/dL  Hematocrit : 38.4 %  Platelet Count - Automated : 371 K/uL  Mean Cell Volume : 100.5 fl  Mean Cell Hemoglobin : 34.0 pg  Mean Cell Hemoglobin Concentration : 33.9 gm/dL  Auto Neutrophil # : x  Auto Lymphocyte # : x  Auto Monocyte # : x  Auto Eosinophil # : x  Auto Basophil # : x  Auto Neutrophil % : x  Auto Lymphocyte % : x  Auto Monocyte % : x  Auto Eosinophil % : x  Auto Basophil % : x    .		Differential:	[] Automated		[] Manual  Chemistry                        13.0   11.57 )-----------( 371      ( 15 Aug 2021 09:32 )             38.4     08-15    142  |  108  |  36<H>  ----------------------------<  133<H>  4.3   |  31  |  1.02    Ca    8.4<L>      15 Aug 2021 09:32  Phos  2.6     08-15  Mg     2.0     08-15        MICROBIOLOGY/CULTURES:  Culture Results:   GI PCR Results: NOT detected  *******Please Note:*******  GI panel PCR evaluates for:  Campylobacter, Plesiomonas shigelloides, Salmonella,  Vibrio, Yersinia enterocolitica, Enteroaggregative  Escherichia coli (EAEC), Enteropathogenic E.coli (EPEC),  Enterotoxigenic E. coli (ETEC) lt/st, Shiga-like  toxin-producing E. coli (STEC) stx1/stx2,  Shigella/ Enteroinvasive E. coli (EIEC), Cryptosporidium,  Cyclospora cayetanensis, Entamoeba histolytica,  Giardia lamblia, Adenovirus F 40/41, Astrovirus,  Norovirus GI/GII, Rotavirus A, Sapovirus (08-10 @ 21:28)        RADIOLOGY/EKG:    x< from: Xray Chest 1 View- PORTABLE-Urgent (08.09.21 @ 14:57) >    IMPRESSION: Linear atelectases presently seen at right base.    < end of copied text >      < from: US Duplex Venous Lower Ext Complete, Bilateral (08.09.21 @ 19:33) >    IMPRESSION:  No evidence of deep venous thrombosis in either lower extremity.      < end of copied text >    x< from: Xray Knee 1 or 2 Views, Right (08.10.21 @ 17:00) >    IMPRESSION: Sizable knee effusion. Degeneration as above.      < end of copied text >    ct< from: CT Abdomen and Pelvis w/ IV Cont (08.13.21 @ 18:23) >    IMPRESSION: Distended colon containing stool and fluid.    Left-sided renal calculi as above.    Multiple calculi in the bladder measuring up to 4 cm.        < end of copied text >      
Cheif complaints and Diagnosis: RLE cellulitis/ b/l edema    Subjective: waiting for colonscopy today       REVIEW OF SYSTEMS:    CONSTITUTIONAL: No weakness, fevers or chills  EYES/ENT: No visual changes;  No vertigo or throat pain   NECK: No pain or stiffness  RESPIRATORY: No cough, wheezing, hemoptysis; No shortness of breath  CARDIOVASCULAR: No chest pain or palpitations  GASTROINTESTINAL: No abdominal or epigastric pain. No nausea, vomiting, or hematemesis; No diarrhea or constipation. No melena or hematochezia.  GENITOURINARY: No dysuria, frequency or hematuria  NEUROLOGICAL: No numbness or weakness  SKIN: No itching, burning, rashes, or lesions   All other review of systems is negative unless indicated above      Vital Signs Last 24 Hrs  T(C): 35.8 (17 Aug 2021 08:11), Max: 36.4 (16 Aug 2021 23:31)  T(F): 96.5 (17 Aug 2021 08:11), Max: 97.5 (16 Aug 2021 23:31)  HR: 63 (17 Aug 2021 16:20) (63 - 98)  BP: 102/67 (17 Aug 2021 16:20) (102/67 - 125/72)  BP(mean): --  RR: 18 (17 Aug 2021 16:20) (18 - 18)  SpO2: 98% (17 Aug 2021 16:20) (98% - 100%)    HEENT:   pupils equal and reactive, EOMI, no oropharyngeal lesions, erythema, exudates, oral thrush    NECK:   supple, no carotid bruits, no palpable lymph nodes, no thyromegaly    CV:  +S1, +S2, regular, no murmurs or rubs    RESP:   lungs clear to auscultation bilaterally, no wheezing, rales, rhonchi, good air entry bilaterally    BREAST:  not examined    GI:  abdomen soft, non-tender, non-distended, normal BS, no bruits, no abdominal masses, no palpable masses    RECTAL:  not examined    :  not examined    MSK:   normal muscle tone, no atrophy, no rigidity, no contractions    EXT:   no clubbing, no cyanosis, no edema, no calf pain, swelling or erythema    VASCULAR:  pulses equal and symmetric in the upper and lower extremities    NEURO:  AAOX3, no focal neurological deficits, follows all commands, able to move extremities spontaneously    SKIN:  no ulcers, lesions or rashes    MEDICATIONS  (STANDING):  atorvastatin 10 milliGRAM(s) Oral at bedtime  budesonide 160 MICROgram(s)/formoterol 4.5 MICROgram(s) Inhaler 2 Puff(s) Inhalation two times a day  cefuroxime   Tablet 250 milliGRAM(s) Oral every 12 hours  diltiazem    milliGRAM(s) Oral daily  enoxaparin Injectable 40 milliGRAM(s) SubCutaneous daily  folic acid 1 milliGRAM(s) Oral daily  lactobacillus acidophilus 1 Tablet(s) Oral two times a day  lisinopril 10 milliGRAM(s) Oral daily  metoprolol tartrate 25 milliGRAM(s) Oral two times a day  pantoprazole    Tablet 40 milliGRAM(s) Oral daily  potassium chloride    Tablet ER 20 milliEquivalent(s) Oral daily  predniSONE   Tablet 40 milliGRAM(s) Oral daily  sodium chloride 0.9%. 1000 milliLiter(s) (50 mL/Hr) IV Continuous <Continuous>  tiotropium 18 MICROgram(s) Capsule 1 Capsule(s) Inhalation daily    MEDICATIONS  (PRN):  acetaminophen   Tablet .. 650 milliGRAM(s) Oral every 6 hours PRN Temp greater or equal to 38.5C (101.3F), Mild Pain (1 - 3)  aluminum hydroxide/magnesium hydroxide/simethicone Suspension 30 milliLiter(s) Oral every 4 hours PRN Dyspepsia  melatonin 3 milliGRAM(s) Oral at bedtime PRN Insomnia  ondansetron Injectable 4 milliGRAM(s) IV Push every 8 hours PRN Nausea and/or Vomiting      17 Aug 2021 08:08    146    |  115    |  53     ----------------------------<  114    3.5     |  24     |  1.56     Ca    8.8        17 Aug 2021 08:08  Phos  4.9       17 Aug 2021 08:08  Mg     2.2       17 Aug 2021 08:08    CBC Full  -  ( 17 Aug 2021 08:08 )  WBC Count : 15.72 K/uL  Hemoglobin : 14.3 g/dL  Hematocrit : 43.1 %  Platelet Count - Automated : 399 K/uL  Mean Cell Volume : 101.9 fl  Mean Cell Hemoglobin : 33.8 pg  Mean Cell Hemoglobin Concentration : 33.2 gm/dL            Assessment and Plan:     79 yo male with PMH of COPD, HTN, HLD, hemochromatosis admitted with:    #Sepsis, present on admission.  #b/l LE edema secondary to fluid overload with mild RLE cellulitis  #Right knee Swelling 2ndry to Gout and Pseudogout  - Doppler of LE  - Neg for DVT   - xray of the right knee - stable effusion  - Elevated ESR, CRP  - S/P IV lasix daily switch to PO lasix after colonoscopy  - ceftriaxone IV#6 change to ceftin 250 mg po q 12H #1 for 8 more days  - elevate extremities  - PT eval  - ID consult appreciated   - Ortho consult appreciated  - S/P Aspiration of R knee (8/11) yielded +MSU and CPP ; GS negative, CUltures still pending.  - S/P prednisone 40mg PO QD#5; continue Colchine    #Hypokalemia 2ndry to Acute on Chronic Diarrhea   - CDiff and GI PCR neg   - monitor K  - GI consult appreciated - If no improvement can check labs/urine to evaluate for neuroendocrine tumor  - lactose free intolerant   - Start immodium  - CT scan a/p with IV contrast Distended colon containing stool and fluid.  - FU labs for celiac disease   - GI FU appreciated - CT with stool throughout colon but no impaction, seems to be liquid stool on CT. Possibly related to imodium use. cont Imodium BID for now, this is OK with CT findings  - patient initially refused Colonoscopy, now wants to do it. FU with Dr Hahn >> colonoscopy today    #Multiple renal and Bladder calculi  - outpatient      #COPD  - continue spiriva and advair    #HTN  - continue home meds    #HLD  - continue statin    #DVT prophylaxis  - lovenox    #Advance Directives  - FULL CODE    #Dispo - Patietn here with RLE Cellulitis and right knee gout; S/P IV rocephin and prednisone X 5 days; on colhicine; patient has a hx of chronic diarrhea and never had work up done; Stool studies neg and now on immodium BID; patient finally agreed to coloscopy FU with GI; resume lasix after colonoscopy      
Cheif complaints and Diagnosis: RLE cellulitis/ chronic diahrrea    Subjective: no complaints      REVIEW OF SYSTEMS:    CONSTITUTIONAL: No weakness, fevers or chills  EYES/ENT: No visual changes;  No vertigo or throat pain   NECK: No pain or stiffness  RESPIRATORY: No cough, wheezing, hemoptysis; No shortness of breath  CARDIOVASCULAR: No chest pain or palpitations  GASTROINTESTINAL: No abdominal or epigastric pain. No nausea, vomiting, or hematemesis; No diarrhea or constipation. No melena or hematochezia.  GENITOURINARY: No dysuria, frequency or hematuria  NEUROLOGICAL: No numbness or weakness  SKIN: No itching, burning, rashes, or lesions   All other review of systems is negative unless indicated above      Vital Signs Last 24 Hrs  T(C): 36.3 (19 Aug 2021 07:56), Max: 36.4 (18 Aug 2021 15:47)  T(F): 97.4 (19 Aug 2021 07:56), Max: 97.6 (18 Aug 2021 15:47)  HR: 71 (19 Aug 2021 08:34) (61 - 72)  BP: 144/84 (19 Aug 2021 07:56) (122/76 - 144/84)  BP(mean): --  RR: 18 (19 Aug 2021 07:56) (18 - 18)  SpO2: 99% (19 Aug 2021 07:56) (99% - 99%)    HEENT:   pupils equal and reactive, EOMI, no oropharyngeal lesions, erythema, exudates, oral thrush    NECK:   supple, no carotid bruits, no palpable lymph nodes, no thyromegaly    CV:  +S1, +S2, regular, no murmurs or rubs    RESP:   lungs clear to auscultation bilaterally, no wheezing, rales, rhonchi, good air entry bilaterally    BREAST:  not examined    GI:  abdomen soft, non-tender, non-distended, normal BS, no bruits, no abdominal masses, no palpable masses    RECTAL:  not examined    :  not examined    MSK:   normal muscle tone, no atrophy, no rigidity, no contractions    EXT:   no clubbing, no cyanosis, no edema, no calf pain, swelling or erythema    VASCULAR:  pulses equal and symmetric in the upper and lower extremities    NEURO:  AAOX3, no focal neurological deficits, follows all commands, able to move extremities spontaneously    SKIN:  no ulcers, lesions or rashes    MEDICATIONS  (STANDING):  atorvastatin 10 milliGRAM(s) Oral at bedtime  budesonide 160 MICROgram(s)/formoterol 4.5 MICROgram(s) Inhaler 2 Puff(s) Inhalation two times a day  cefuroxime   Tablet 250 milliGRAM(s) Oral every 12 hours  diltiazem    milliGRAM(s) Oral daily  enoxaparin Injectable 40 milliGRAM(s) SubCutaneous daily  folic acid 1 milliGRAM(s) Oral daily  furosemide    Tablet 40 milliGRAM(s) Oral daily  lactobacillus acidophilus 1 Tablet(s) Oral two times a day  lisinopril 10 milliGRAM(s) Oral daily  metoprolol tartrate 25 milliGRAM(s) Oral two times a day  potassium chloride    Tablet ER 20 milliEquivalent(s) Oral daily  sodium chloride 0.9%. 1000 milliLiter(s) (50 mL/Hr) IV Continuous <Continuous>  tiotropium 18 MICROgram(s) Capsule 1 Capsule(s) Inhalation daily    MEDICATIONS  (PRN):  acetaminophen   Tablet .. 650 milliGRAM(s) Oral every 6 hours PRN Temp greater or equal to 38.5C (101.3F), Mild Pain (1 - 3)  aluminum hydroxide/magnesium hydroxide/simethicone Suspension 30 milliLiter(s) Oral every 4 hours PRN Dyspepsia  melatonin 3 milliGRAM(s) Oral at bedtime PRN Insomnia  ondansetron Injectable 4 milliGRAM(s) IV Push every 8 hours PRN Nausea and/or Vomiting      19 Aug 2021 08:44    143    |  111    |  37     ----------------------------<  109    3.5     |  27     |  1.12     Ca    8.4        19 Aug 2021 08:44          Assessment and Plan:     81 yo male with PMH of COPD, HTN, HLD, hemochromatosis admitted with:    #Sepsis, present on admission.  #b/l LE edema secondary to fluid overload with mild RLE cellulitis  #Right knee Swelling 2ndry to Gout and Pseudogout  - Doppler of LE  - Neg for DVT   - xray of the right knee - stable effusion  - Elevated ESR, CRP  - S/P IV lasix >> now on PO  - ceftriaxone IV#6 change to ceftin 250 mg po q 12H #1 for 8 more days  - elevate extremities  - PT eval  - ID consult appreciated   - Ortho consult appreciated  - S/P Aspiration of R knee (8/11) yielded +MSU and CPP ; GS negative, CUltures still pending.  - S/P prednisone 40mg PO QD#5;    #Hypokalemia 2ndry to Acute on Chronic Diarrhea   - CDiff and GI PCR neg   - monitor K  - GI consult appreciated - If no improvement can check labs/urine to evaluate for neuroendocrine tumor  - lactose free intolerant   - Start immodium  - CT scan a/p with IV contrast Distended colon containing stool and fluid.  - FU labs for celiac disease   - GI FU appreciated - CT with stool throughout colon but no impaction, seems to be liquid stool on CT. Possibly related to imodium use. cont Imodium BID for now, this is OK with CT findings  -s/p colonoscopy >> outpatient f/u for biopsy results.     #acute gout  -s/p oral prednisone    #hypokalemia  -supplemented      #Multiple renal and Bladder calculi  - outpatient      #COPD  - continue spiriva and advair    #HTN  - continue home meds    #HLD  - continue statin    #DVT prophylaxis  - lovenox    #Advance Directives  - FULL CODE    Dispo: patient waiting bed at [facility]. status quo no change.         
Cheif complaints and Diagnosis: sepsis RLE cellulitis/  chronic diahrrea/ hypokalemia    Subjective: no complaints      REVIEW OF SYSTEMS:    CONSTITUTIONAL: No weakness, fevers or chills  EYES/ENT: No visual changes;  No vertigo or throat pain   NECK: No pain or stiffness  RESPIRATORY: No cough, wheezing, hemoptysis; No shortness of breath  CARDIOVASCULAR: No chest pain or palpitations  GASTROINTESTINAL: No abdominal or epigastric pain. No nausea, vomiting, or hematemesis; No diarrhea or constipation. No melena or hematochezia.  GENITOURINARY: No dysuria, frequency or hematuria  NEUROLOGICAL: No numbness or weakness  SKIN: No itching, burning, rashes, or lesions   All other review of systems is negative unless indicated above      Vital Signs Last 24 Hrs  T(C): 36.4 (18 Aug 2021 15:47), Max: 36.4 (18 Aug 2021 08:38)  T(F): 97.6 (18 Aug 2021 15:47), Max: 97.6 (18 Aug 2021 08:38)  HR: 70 (18 Aug 2021 15:47) (45 - 70)  BP: 122/76 (18 Aug 2021 15:47) (102/67 - 142/79)  BP(mean): --  RR: 18 (18 Aug 2021 15:47) (17 - 18)  SpO2: 99% (18 Aug 2021 15:47) (97% - 99%)    HEENT:   pupils equal and reactive, EOMI, no oropharyngeal lesions, erythema, exudates, oral thrush    NECK:   supple, no carotid bruits, no palpable lymph nodes, no thyromegaly    CV:  +S1, +S2, regular, no murmurs or rubs    RESP:   lungs clear to auscultation bilaterally, no wheezing, rales, rhonchi, good air entry bilaterally    BREAST:  not examined    GI:  abdomen soft, non-tender, non-distended, normal BS, no bruits, no abdominal masses, no palpable masses    RECTAL:  not examined    :  not examined    MSK:   normal muscle tone, no atrophy, no rigidity, no contractions    EXT:   no clubbing, no cyanosis, no edema, no calf pain, swelling or erythema    VASCULAR:  pulses equal and symmetric in the upper and lower extremities    NEURO:  AAOX3, no focal neurological deficits, follows all commands, able to move extremities spontaneously    SKIN:  no ulcers, lesions or rashes    MEDICATIONS  (STANDING):  atorvastatin 10 milliGRAM(s) Oral at bedtime  budesonide 160 MICROgram(s)/formoterol 4.5 MICROgram(s) Inhaler 2 Puff(s) Inhalation two times a day  cefuroxime   Tablet 250 milliGRAM(s) Oral every 12 hours  diltiazem    milliGRAM(s) Oral daily  enoxaparin Injectable 40 milliGRAM(s) SubCutaneous daily  folic acid 1 milliGRAM(s) Oral daily  lactobacillus acidophilus 1 Tablet(s) Oral two times a day  lisinopril 10 milliGRAM(s) Oral daily  metoprolol tartrate 25 milliGRAM(s) Oral two times a day  pantoprazole    Tablet 40 milliGRAM(s) Oral daily  potassium chloride    Tablet ER 20 milliEquivalent(s) Oral daily  predniSONE   Tablet 40 milliGRAM(s) Oral daily  sodium chloride 0.9%. 1000 milliLiter(s) (50 mL/Hr) IV Continuous <Continuous>  tiotropium 18 MICROgram(s) Capsule 1 Capsule(s) Inhalation daily    MEDICATIONS  (PRN):  acetaminophen   Tablet .. 650 milliGRAM(s) Oral every 6 hours PRN Temp greater or equal to 38.5C (101.3F), Mild Pain (1 - 3)  aluminum hydroxide/magnesium hydroxide/simethicone Suspension 30 milliLiter(s) Oral every 4 hours PRN Dyspepsia  melatonin 3 milliGRAM(s) Oral at bedtime PRN Insomnia  ondansetron Injectable 4 milliGRAM(s) IV Push every 8 hours PRN Nausea and/or Vomiting      18 Aug 2021 08:01    146    |  113    |  42     ----------------------------<  113    3.2     |  27     |  1.12     Ca    8.0        18 Aug 2021 08:01  Phos  4.9       17 Aug 2021 08:08  Mg     2.2       17 Aug 2021 08:08    CBC Full  -  ( 17 Aug 2021 08:08 )  WBC Count : 15.72 K/uL  Hemoglobin : 14.3 g/dL  Hematocrit : 43.1 %  Platelet Count - Automated : 399 K/uL  Mean Cell Volume : 101.9 fl  Mean Cell Hemoglobin : 33.8 pg  Mean Cell Hemoglobin Concentration : 33.2 gm/dL            Assessment and Plan:     79 yo male with PMH of COPD, HTN, HLD, hemochromatosis admitted with:    #Sepsis, present on admission.  #b/l LE edema secondary to fluid overload with mild RLE cellulitis  #Right knee Swelling 2ndry to Gout and Pseudogout  - Doppler of LE  - Neg for DVT   - xray of the right knee - stable effusion  - Elevated ESR, CRP  - S/P IV lasix >> now on PO  - ceftriaxone IV#6 change to ceftin 250 mg po q 12H #1 for 8 more days  - elevate extremities  - PT eval  - ID consult appreciated   - Ortho consult appreciated  - S/P Aspiration of R knee (8/11) yielded +MSU and CPP ; GS negative, CUltures still pending.  - S/P prednisone 40mg PO QD#5;    #Hypokalemia 2ndry to Acute on Chronic Diarrhea   - CDiff and GI PCR neg   - monitor K  - GI consult appreciated - If no improvement can check labs/urine to evaluate for neuroendocrine tumor  - lactose free intolerant   - Start immodium  - CT scan a/p with IV contrast Distended colon containing stool and fluid.  - FU labs for celiac disease   - GI FU appreciated - CT with stool throughout colon but no impaction, seems to be liquid stool on CT. Possibly related to imodium use. cont Imodium BID for now, this is OK with CT findings  -s/p colonoscopy >> outpatient f/u for biopsy results.     #acute gout  -s/p oral prednisone    #hypokalemia  -supplemented      #Multiple renal and Bladder calculi  - outpatient      #COPD  - continue spiriva and advair    #HTN  - continue home meds    #HLD  - continue statin    #DVT prophylaxis  - lovenox    #Advance Directives  - FULL CODE    Dispo: patient waiting bed at [facility].     
Cheif complaints and Diagnosis: sepsis secondary to lower ext cellulitis/ gout    Subjective: no complaints      REVIEW OF SYSTEMS:    CONSTITUTIONAL: No weakness, fevers or chills  EYES/ENT: No visual changes;  No vertigo or throat pain   NECK: No pain or stiffness  RESPIRATORY: No cough, wheezing, hemoptysis; No shortness of breath  CARDIOVASCULAR: No chest pain or palpitations  GASTROINTESTINAL: No abdominal or epigastric pain. No nausea, vomiting, or hematemesis; No diarrhea or constipation. No melena or hematochezia.  GENITOURINARY: No dysuria, frequency or hematuria  NEUROLOGICAL: No numbness or weakness  SKIN: No itching, burning, rashes, or lesions   All other review of systems is negative unless indicated above      Vital Signs Last 24 Hrs  T(C): 36.6 (21 Aug 2021 08:23), Max: 36.6 (21 Aug 2021 08:23)  T(F): 97.8 (21 Aug 2021 08:23), Max: 97.8 (21 Aug 2021 08:23)  HR: 83 (21 Aug 2021 09:15) (63 - 83)  BP: 144/74 (21 Aug 2021 08:23) (101/68 - 144/74)  BP(mean): --  RR: 18 (21 Aug 2021 08:23) (18 - 18)  SpO2: 98% (21 Aug 2021 09:15) (98% - 100%)    HEENT:   pupils equal and reactive, EOMI, no oropharyngeal lesions, erythema, exudates, oral thrush    NECK:   supple, no carotid bruits, no palpable lymph nodes, no thyromegaly    CV:  +S1, +S2, regular, no murmurs or rubs    RESP:   lungs clear to auscultation bilaterally, no wheezing, rales, rhonchi, good air entry bilaterally    BREAST:  not examined    GI:  abdomen soft, non-tender, non-distended, normal BS, no bruits, no abdominal masses, no palpable masses    RECTAL:  not examined    :  not examined    MSK:   normal muscle tone, no atrophy, no rigidity, no contractions    EXT:   no clubbing, no cyanosis, no edema, no calf pain, swelling or erythema    VASCULAR:  pulses equal and symmetric in the upper and lower extremities    NEURO:  AAOX3, no focal neurological deficits, follows all commands, able to move extremities spontaneously    SKIN:  no ulcers, lesions or rashes    MEDICATIONS  (STANDING):  atorvastatin 10 milliGRAM(s) Oral at bedtime  buDESOnide    EC Capsule 9 milliGRAM(s) Oral at bedtime  budesonide 160 MICROgram(s)/formoterol 4.5 MICROgram(s) Inhaler 2 Puff(s) Inhalation two times a day  cefuroxime   Tablet 250 milliGRAM(s) Oral every 12 hours  diltiazem    milliGRAM(s) Oral daily  enoxaparin Injectable 40 milliGRAM(s) SubCutaneous daily  folic acid 1 milliGRAM(s) Oral daily  furosemide    Tablet 40 milliGRAM(s) Oral daily  lactobacillus acidophilus 1 Tablet(s) Oral two times a day  lisinopril 10 milliGRAM(s) Oral daily  metoprolol tartrate 25 milliGRAM(s) Oral two times a day  potassium chloride    Tablet ER 20 milliEquivalent(s) Oral daily  sodium chloride 0.9%. 1000 milliLiter(s) (50 mL/Hr) IV Continuous <Continuous>  tiotropium 18 MICROgram(s) Capsule 1 Capsule(s) Inhalation daily    MEDICATIONS  (PRN):  acetaminophen   Tablet .. 650 milliGRAM(s) Oral every 6 hours PRN Temp greater or equal to 38.5C (101.3F), Mild Pain (1 - 3)  aluminum hydroxide/magnesium hydroxide/simethicone Suspension 30 milliLiter(s) Oral every 4 hours PRN Dyspepsia  melatonin 3 milliGRAM(s) Oral at bedtime PRN Insomnia  ondansetron Injectable 4 milliGRAM(s) IV Push every 8 hours PRN Nausea and/or Vomiting                        RADIOLOGY RESULTS:          Assessment and Plan:        Hospital Course:    79 yo male with PMH of COPD, HTN, HLD, hemochromatosis admitted with:    #Sepsis, present on admission.  #b/l LE edema secondary to fluid overload with mild RLE cellulitis    - Doppler of LE  - Neg for DVT   - S/P IV lasix >> now on PO  - ceftriaxone IV#6 change to ceftin 250 mg po q 12H #1 for 8 more days (end date 8/23/21)  - elevate extremities  - PT eval  - ID consult appreciated   - Ortho consult appreciated        #Right knee Swelling 2ndry to Gout and Pseudogout  - S/P Aspiration of R knee (8/11) yielded +MSU and CPP ; GS negative, CUltures still pending.  - S/P prednisone 40mg PO QD#5;  - xray of the right knee - stable effusion  - Elevated ESR, CRP    #Hypokalemia 2ndry to Acute on Chronic Diarrhea   - CDiff and GI PCR neg   - GI FU appreciated - CT with stool throughout colon but no impaction, seems to be liquid stool on CT. Possibly related to imodium use. cont Imodium BID for now, this is OK with CT findings  -s/p colonoscopy >> outpatient f/u for biopsy results>> microscopic colitis >started entocort 9 mg QHS for microscopic colitis, continue until seen as outpatient  -Imodium prn      #acute gout  -s/p oral prednisone    #Multiple renal and Bladder calculi  - outpatient        Dispo: patient waiting bed at [facility]. status quo no change.     
GI    HPI:  feels somewhat better in general  1 BM today  no abd pain  nai PO    ROS  As above  Otherwise unremarkable, all systems reviewed    PE:  Vital Signs Last 24 Hrs  T(C): 36.6 (12 Aug 2021 08:13), Max: 36.7 (11 Aug 2021 15:15)  T(F): 97.8 (12 Aug 2021 08:13), Max: 98.1 (11 Aug 2021 15:15)  HR: 73 (12 Aug 2021 08:13) (72 - 82)  BP: 120/79 (12 Aug 2021 08:13) (98/67 - 120/79)  BP(mean): --  RR: 18 (12 Aug 2021 08:13) (16 - 18)  SpO2: 98% (12 Aug 2021 08:13) (97% - 100%)    Constitutional: NAD, well-developed, A+Ox3  Anicteric   Respiratory: CTABL, breathing comfortably  Cardiovascular: S1 and S2, RRR  Gastrointestinal: +BS, soft, non tender, non distended, no mass  Extremities: warm, well perfused, trace edema, wrappings noted, chronic skin changes noted  Psychiatric: Normal mood, normal affect  Neuro: moves all extremities, grossly intact  Skin: No rashes or lesions    LABS:            stool tests negative (C diff and GI PCR)
HOSPITALIST PROGRESS NOTE:  SUBJECTIVE:  PCP:  Chief Complaint: Patient is a 80y old  Male who presents with a chief complaint of LE edema (09 Aug 2021 17:38)      HPI:  79 yo male with PMH of COPD, HTN, HLD, hemochromatosis presents to ED with b/l LE edema. Pt has been non compliant with medications. States has ran out of some medications but has not followed up with his PCP. He is on lasix for LE edema but thinks he ran out. states over the last few weeks he noted worsening LE edema and a few days ago noted weeping from RLE. No fevers. Has difficulty ambulating secondary to edema. Pt lives by himself at home. Pt denies SOB or CP. No abd pain, N/V/D.  (09 Aug 2021 17:38)    8/10: ABove reviewed; Patient states his diarrhea is chronic? He denies any hx of gout. NO hs of aspiration of the right knee  8/11: S/P arthrocentesis of right leg yesterday; continues to have diarrhea   8/12: Patient has no complaints; less diarrhea after starting immodium; K is better; Patient refusing colonoscopy and will think about the CT abd  8/13:  Patient agreeing to CT abd with IV contrast and wants rehab; less diarrhea   8/14:  Patient had a large bowel movement last night; No other complaints.   8/15:  PAtient has no complaints; thinking about possible colonoscopy; hes going to discuss it with his daughter     Allergies:  No Known Allergies    REVIEW OF SYSTEMS:  See HPI. All other review of systems is negative unless indicated above.     OBJECTIVE  Physical Exam:  Vital Signs Last 24 Hrs  T(C): 36.3 (15 Aug 2021 08:28), Max: 36.6 (14 Aug 2021 16:04)  T(F): 97.4 (15 Aug 2021 08:28), Max: 97.9 (14 Aug 2021 16:04)  HR: 76 (15 Aug 2021 08:43) (68 - 94)  BP: 153/85 (15 Aug 2021 08:28) (116/85 - 153/85)  BP(mean): --  RR: 18 (15 Aug 2021 08:28) (18 - 18)  SpO2: 97% (15 Aug 2021 08:28) (96% - 97%)    Constitutional: NAD, awake, disheveled   Neurological: no focal deficits  HEENT: PERRLA, EOMI, MMM  Neck: Soft and supple, No LAD, No JVD  Respiratory: Breath sounds are clear bilaterally, No wheezing, rales or rhonchi  Cardiovascular: S1 and S2, regular rate and rhythm; no Murmurs, gallops or rubs  Gastrointestinal: Bowel Sounds present, soft, nontender, nondistended, no guarding, no rebound tenderness  Back: No CVA tenderness   Extremities: +B/: Peripheral edema r>L Right knee swollen and warm to touch - covered with ace bandage   Vascular: 2+ peripheral pulses  Musculoskeletal: 5/5 strength b/l upper and lower extremities  Skin: No rashes  Breast: Deferred  Rectal: Deferred    MEDICATIONS  (STANDING):  atorvastatin 10 milliGRAM(s) Oral at bedtime  budesonide 160 MICROgram(s)/formoterol 4.5 MICROgram(s) Inhaler 2 Puff(s) Inhalation two times a day  cefTRIAXone Injectable.      cefTRIAXone Injectable. 1000 milliGRAM(s) IV Push every 24 hours  diltiazem    milliGRAM(s) Oral daily  enoxaparin Injectable 40 milliGRAM(s) SubCutaneous daily  folic acid 1 milliGRAM(s) Oral daily  furosemide   Injectable 40 milliGRAM(s) IV Push daily  lisinopril 10 milliGRAM(s) Oral daily  metoprolol tartrate 25 milliGRAM(s) Oral two times a day  potassium chloride    Tablet ER 20 milliEquivalent(s) Oral daily  tiotropium 18 MICROgram(s) Capsule 1 Capsule(s) Inhalation daily    Lab Results:  CBC  CBC Full  -  ( 15 Aug 2021 09:32 )  WBC Count : 11.57 K/uL  RBC Count : 3.82 M/uL  Hemoglobin : 13.0 g/dL  Hematocrit : 38.4 %  Platelet Count - Automated : 371 K/uL  Mean Cell Volume : 100.5 fl  Mean Cell Hemoglobin : 34.0 pg  Mean Cell Hemoglobin Concentration : 33.9 gm/dL  Auto Neutrophil # : x  Auto Lymphocyte # : x  Auto Monocyte # : x  Auto Eosinophil # : x  Auto Basophil # : x  Auto Neutrophil % : x  Auto Lymphocyte % : x  Auto Monocyte % : x  Auto Eosinophil % : x  Auto Basophil % : x    .		Differential:	[] Automated		[] Manual  Chemistry                        13.0   11.57 )-----------( 371      ( 15 Aug 2021 09:32 )             38.4     08-15    142  |  108  |  36<H>  ----------------------------<  133<H>  4.3   |  31  |  1.02    Ca    8.4<L>      15 Aug 2021 09:32  Phos  2.6     08-15  Mg     2.0     08-15      MICROBIOLOGY/CULTURES:  Culture Results:   GI PCR Results: NOT detected  *******Please Note:*******  GI panel PCR evaluates for:  Campylobacter, Plesiomonas shigelloides, Salmonella,  Vibrio, Yersinia enterocolitica, Enteroaggregative  Escherichia coli (EAEC), Enteropathogenic E.coli (EPEC),  Enterotoxigenic E. coli (ETEC) lt/st, Shiga-like  toxin-producing E. coli (STEC) stx1/stx2,  Shigella/ Enteroinvasive E. coli (EIEC), Cryptosporidium,  Cyclospora cayetanensis, Entamoeba histolytica,  Giardia lamblia, Adenovirus F 40/41, Astrovirus,  Norovirus GI/GII, Rotavirus A, Sapovirus (08-10 @ 21:28)  Culture Results:   No Growth Final (08-09 @ 14:46)        MICROBIOLOGY/CULTURES:  Culture Results:   GI PCR Results: NOT detected  *******Please Note:*******  GI panel PCR evaluates for:  Campylobacter, Plesiomonas shigelloides, Salmonella,  Vibrio, Yersinia enterocolitica, Enteroaggregative  Escherichia coli (EAEC), Enteropathogenic E.coli (EPEC),  Enterotoxigenic E. coli (ETEC) lt/st, Shiga-like  toxin-producing E. coli (STEC) stx1/stx2,  Shigella/ Enteroinvasive E. coli (EIEC), Cryptosporidium,  Cyclospora cayetanensis, Entamoeba histolytica,  Giardia lamblia, Adenovirus F 40/41, Astrovirus,  Norovirus GI/GII, Rotavirus A, Sapovirus (08-10 @ 21:28)  Culture Results:   No growth to date. (08-09 @ 14:46)    RADIOLOGY/EKG:    x< from: Xray Chest 1 View- PORTABLE-Urgent (08.09.21 @ 14:57) >    IMPRESSION: Linear atelectases presently seen at right base.    < end of copied text >      < from: US Duplex Venous Lower Ext Complete, Bilateral (08.09.21 @ 19:33) >    IMPRESSION:  No evidence of deep venous thrombosis in either lower extremity.      < end of copied text >    x< from: Xray Knee 1 or 2 Views, Right (08.10.21 @ 17:00) >    IMPRESSION: Sizable knee effusion. Degeneration as above.      < end of copied text >    ct< from: CT Abdomen and Pelvis w/ IV Cont (08.13.21 @ 18:23) >    IMPRESSION: Distended colon containing stool and fluid.    Left-sided renal calculi as above.    Multiple calculi in the bladder measuring up to 4 cm.        < end of copied text >      
  Date of service: 08-13-21 @ 10:40      Patient lying in bed; no specific complaints, afebrile      ROS: no fever or chills; denies dizziness, no HA, no SOB or cough, no abdominal pain, no diarrhea or constipation; no dysuria, no urinary frequency, no legs pain, no rashes    MEDICATIONS  (STANDING):  atorvastatin 10 milliGRAM(s) Oral at bedtime  budesonide 160 MICROgram(s)/formoterol 4.5 MICROgram(s) Inhaler 2 Puff(s) Inhalation two times a day  cefTRIAXone Injectable.      cefTRIAXone Injectable. 1000 milliGRAM(s) IV Push every 24 hours  colchicine 0.6 milliGRAM(s) Oral every 12 hours  diltiazem    milliGRAM(s) Oral daily  enoxaparin Injectable 40 milliGRAM(s) SubCutaneous daily  folic acid 1 milliGRAM(s) Oral daily  lisinopril 10 milliGRAM(s) Oral daily  loperamide 2 milliGRAM(s) Oral three times a day  metoprolol tartrate 25 milliGRAM(s) Oral two times a day  pantoprazole    Tablet 40 milliGRAM(s) Oral daily  potassium chloride    Tablet ER 40 milliEquivalent(s) Oral once  potassium chloride    Tablet ER 20 milliEquivalent(s) Oral daily  predniSONE   Tablet 40 milliGRAM(s) Oral daily  tiotropium 18 MICROgram(s) Capsule 1 Capsule(s) Inhalation daily    MEDICATIONS  (PRN):  acetaminophen   Tablet .. 650 milliGRAM(s) Oral every 6 hours PRN Temp greater or equal to 38.5C (101.3F), Mild Pain (1 - 3)  aluminum hydroxide/magnesium hydroxide/simethicone Suspension 30 milliLiter(s) Oral every 4 hours PRN Dyspepsia  melatonin 3 milliGRAM(s) Oral at bedtime PRN Insomnia  ondansetron Injectable 4 milliGRAM(s) IV Push every 8 hours PRN Nausea and/or Vomiting      Vital Signs Last 24 Hrs  T(C): 36.2 (13 Aug 2021 08:25), Max: 36.4 (12 Aug 2021 15:37)  T(F): 97.1 (13 Aug 2021 08:25), Max: 97.5 (12 Aug 2021 15:37)  HR: 73 (13 Aug 2021 08:25) (73 - 84)  BP: 120/73 (13 Aug 2021 08:25) (108/62 - 120/73)  BP(mean): --  RR: 18 (13 Aug 2021 08:25) (18 - 18)  SpO2: 96% (13 Aug 2021 08:25) (96% - 98%)        Physical Exam:          Constitutional: frail looking  HEENT: NC/AT, EOMI, PERRLA, conjunctivae clear; ears and nose atraumatic; pharynx clear  Neck: supple; thyroid not palpable  Back: no tenderness  Respiratory: respiratory effort normal; clear to auscultation  Cardiovascular: S1S2 regular, no murmurs  Abdomen: soft, not tender, not distended, positive BS; no liver or spleen organomegaly  Genitourinary: no suprapubic tenderness  Musculoskeletal: no muscle tenderness, right knee with dressing in place  Neurological/ Psychiatric: AxOx3, judgement and insight normal;  moving all extremities  Skin: no rashes; no palpable lesions    Labs: all available labs reviewed                   Labs:                        12.9   13.50 )-----------( 343      ( 13 Aug 2021 07:47 )             38.2     08-13    139  |  106  |  39<H>  ----------------------------<  123<H>  3.6   |  30  |  1.08    Ca    8.7      13 Aug 2021 07:47  Phos  2.9     08-13  Mg     2.2     08-13             Cultures:       GI PCR Panel, Stool (collected 08-10-21 @ 21:28)  Source: .Stool Feces  Final Report (08-11-21 @ 07:10):    GI PCR Results: NOT detected    *******Please Note:*******    GI panel PCR evaluates for:    Campylobacter, Plesiomonas shigelloides, Salmonella,    Vibrio, Yersinia enterocolitica, Enteroaggregative    Escherichia coli (EAEC), Enteropathogenic E.coli (EPEC),    Enterotoxigenic E. coli (ETEC) lt/st, Shiga-like    toxin-producing E. coli (STEC) stx1/stx2,    Shigella/ Enteroinvasive E. coli (EIEC), Cryptosporidium,    Cyclospora cayetanensis, Entamoeba histolytica,    Giardia lamblia, Adenovirus F 40/41, Astrovirus,    Norovirus GI/GII, Rotavirus A, Sapovirus    Culture - Blood (collected 08-09-21 @ 14:46)  Source: .Blood None  Preliminary Report (08-10-21 @ 20:01):    No growth to date.      C-Reactive Protein, Serum: 236 mg/L (08-11-21 @ 09:10)  C-Reactive Protein, Serum: 226 mg/L (08-10-21 @ 17:36)          Crystals, Synovial Fluid (08.10.21 @ 17:36)    Synovial Crystals Clarity: Turbid    Synovial Crystals Tube: Red Top Tube    Synovial Crystals Color: Yellow    Synovial Crystals Id: Crystals Present Many extra and intra cellular Calcium Pyrophosphate dihydrate crystals  and many intra and extra Monosodium Urate crystals seen under compensated  polarized light microscopy    Cell Count, Body Fluid (08.10.21 @ 17:36)    Monocyte/Macrophage Count - Body Fluid: 1 %    Fluid Segmented Granulocytes: 95 %    Fluid Type: Synovial fluid    Body Fluid Appearance: Cloudy    BF Lymphocytes: 4 %    Tube Type: Lavender    Color - Body Fluid: Brown    Total Nucleated Cell Count, Body Fluid: 8333: Peritoneal/Pleural/ Pericardial  Body Fluid Types            Total Nucleated cells: <500/uL            Neutrophils: <25%          Synovial Body Fluid Type           Total Nucleated cells: <150/uL           Neutrophils: <25%           Lymphocytes: <75%           Moncytes/Macrophages: </=70%  Please note reference range updated effective Nov 26, 2019 /uL    Total RBC Count: 81789 /uL                    Culture Results:   GI PCR Results: NOT detected  *******Please Note:*******  GI panel PCR evaluates for:  Campylobacter, Plesiomonas shigelloides, Salmonella,  Vibrio, Yersinia enterocolitica, Enteroaggregative  Escherichia coli (EAEC), Enteropathogenic E.coli (EPEC),  Enterotoxigenic E. coli (ETEC) lt/st, Shiga-like  toxin-producing E. coli (STEC) stx1/stx2,  Shigella/ Enteroinvasive E. coli (EIEC), Cryptosporidium,  Cyclospora cayetanensis, Entamoeba histolytica,  Giardia lamblia, Adenovirus F 40/41, Astrovirus,  Norovirus GI/GII, Rotavirus A, Sapovirus (08-10 @ 21:28)    Radiology: all available radiological tests reviewed    Advanced directives addressed: full resuscitation
HOSPITALIST PROGRESS NOTE:  SUBJECTIVE:  PCP:  Chief Complaint: Patient is a 80y old  Male who presents with a chief complaint of LE edema (09 Aug 2021 17:38)      HPI:  81 yo male with PMH of COPD, HTN, HLD, hemochromatosis presents to ED with b/l LE edema. Pt has been non compliant with medications. States has ran out of some medications but has not followed up with his PCP. He is on lasix for LE edema but thinks he ran out. states over the last few weeks he noted worsening LE edema and a few days ago noted weeping from RLE. No fevers. Has difficulty ambulating secondary to edema. Pt lives by himself at home. Pt denies SOB or CP. No abd pain, N/V/D.  (09 Aug 2021 17:38)    8/10: ABove reviewed; Patient states his diarrhea is chronic? He denies any hx of gout. NO hs of aspiration of the right knee    Allergies:  No Known Allergies    REVIEW OF SYSTEMS:  See HPI. All other review of systems is negative unless indicated above.     OBJECTIVE  Physical Exam:  Vital Signs:    Vital Signs Last 24 Hrs  T(C): 36.8 (10 Aug 2021 08:41), Max: 37.5 (09 Aug 2021 20:31)  T(F): 98.2 (10 Aug 2021 08:41), Max: 99.5 (09 Aug 2021 20:31)  HR: 98 (10 Aug 2021 09:06) (87 - 114)  BP: 147/79 (10 Aug 2021 08:41) (119/77 - 153/93)  BP(mean): 107 (09 Aug 2021 20:31) (107 - 107)  RR: 18 (10 Aug 2021 08:41) (14 - 18)  SpO2: 97% (10 Aug 2021 08:41) (97% - 98%)  I&O's Summary    09 Aug 2021 07:01  -  10 Aug 2021 07:00  --------------------------------------------------------  IN: 0 mL / OUT: 100 mL / NET: -100 mL    10 Aug 2021 07:01  -  10 Aug 2021 16:27  --------------------------------------------------------  IN: 0 mL / OUT: 400 mL / NET: -400 mL        Constitutional: NAD, awake, disheveled   Neurological: no focal deficits  HEENT: PERRLA, EOMI, MMM  Neck: Soft and supple, No LAD, No JVD  Respiratory: Breath sounds are clear bilaterally, No wheezing, rales or rhonchi  Cardiovascular: S1 and S2, regular rate and rhythm; no Murmurs, gallops or rubs  Gastrointestinal: Bowel Sounds present, soft, nontender, nondistended, no guarding, no rebound tenderness  Back: No CVA tenderness   Extremities: +B/: Peripheral edema r>L Right knee swollen and warm to touch  Vascular: 2+ peripheral pulses  Musculoskeletal: 5/5 strength b/l upper and lower extremities  Skin: No rashes  Breast: Deferred  Rectal: Deferred    MEDICATIONS  (STANDING):  atorvastatin 10 milliGRAM(s) Oral at bedtime  budesonide 160 MICROgram(s)/formoterol 4.5 MICROgram(s) Inhaler 2 Puff(s) Inhalation two times a day  cefTRIAXone Injectable.      cefTRIAXone Injectable. 1000 milliGRAM(s) IV Push every 24 hours  diltiazem    milliGRAM(s) Oral daily  enoxaparin Injectable 40 milliGRAM(s) SubCutaneous daily  folic acid 1 milliGRAM(s) Oral daily  furosemide   Injectable 40 milliGRAM(s) IV Push daily  lisinopril 10 milliGRAM(s) Oral daily  metoprolol tartrate 25 milliGRAM(s) Oral two times a day  potassium chloride    Tablet ER 20 milliEquivalent(s) Oral daily  tiotropium 18 MICROgram(s) Capsule 1 Capsule(s) Inhalation daily      LABS: All Labs Reviewed:                        13.0   11.25 )-----------( 241      ( 10 Aug 2021 08:17 )             37.5     08-10    142  |  105  |  23  ----------------------------<  90  2.6<LL>   |  31  |  1.09    Ca    8.7      10 Aug 2021 08:17  Mg     2.0     08-10    TPro  5.8<L>  /  Alb  2.4<L>  /  TBili  2.0<H>  /  DBili  x   /  AST  14<L>  /  ALT  14  /  AlkPhos  74  08-09    PT/INR - ( 09 Aug 2021 14:46 )   PT: 15.5 sec;   INR: 1.36 ratio         PTT - ( 09 Aug 2021 14:46 )  PTT:32.2 sec  CARDIAC MARKERS ( 09 Aug 2021 14:46 )  <0.015 ng/mL / x     / x     / x     / x        RADIOLOGY/EKG:    x< from: Xray Chest 1 View- PORTABLE-Urgent (08.09.21 @ 14:57) >    IMPRESSION: Linear atelectases presently seen at right base.    < end of copied text >      < from: US Duplex Venous Lower Ext Complete, Bilateral (08.09.21 @ 19:33) >    IMPRESSION:  No evidence of deep venous thrombosis in either lower extremity.      < end of copied text >      
HOSPITALIST PROGRESS NOTE:  SUBJECTIVE:  PCP:  Chief Complaint: Patient is a 80y old  Male who presents with a chief complaint of LE edema (09 Aug 2021 17:38)      HPI:  81 yo male with PMH of COPD, HTN, HLD, hemochromatosis presents to ED with b/l LE edema. Pt has been non compliant with medications. States has ran out of some medications but has not followed up with his PCP. He is on lasix for LE edema but thinks he ran out. states over the last few weeks he noted worsening LE edema and a few days ago noted weeping from RLE. No fevers. Has difficulty ambulating secondary to edema. Pt lives by himself at home. Pt denies SOB or CP. No abd pain, N/V/D.  (09 Aug 2021 17:38)    8/10: ABove reviewed; Patient states his diarrhea is chronic? He denies any hx of gout. NO hs of aspiration of the right knee  8/11: S/P arthrocentesis of right leg yesterday; continues to have diarrhea     Allergies:  No Known Allergies    REVIEW OF SYSTEMS:  See HPI. All other review of systems is negative unless indicated above.     OBJECTIVE  Physical Exam:  Vital Signs Last 24 Hrs  T(C): 36.7 (11 Aug 2021 15:15), Max: 37.9 (10 Aug 2021 17:06)  T(F): 98.1 (11 Aug 2021 15:15), Max: 100.3 (10 Aug 2021 17:06)  HR: 82 (11 Aug 2021 15:15) (82 - 92)  BP: 104/64 (11 Aug 2021 15:15) (104/64 - 125/73)  BP(mean): 85 (10 Aug 2021 17:06) (85 - 85)  RR: 16 (11 Aug 2021 15:15) (16 - 18)  SpO2: 97% (11 Aug 2021 15:15) (96% - 98%)    Constitutional: NAD, awake, disheveled   Neurological: no focal deficits  HEENT: PERRLA, EOMI, MMM  Neck: Soft and supple, No LAD, No JVD  Respiratory: Breath sounds are clear bilaterally, No wheezing, rales or rhonchi  Cardiovascular: S1 and S2, regular rate and rhythm; no Murmurs, gallops or rubs  Gastrointestinal: Bowel Sounds present, soft, nontender, nondistended, no guarding, no rebound tenderness  Back: No CVA tenderness   Extremities: +B/: Peripheral edema r>L Right knee swollen and warm to touch - covered with ace bandage   Vascular: 2+ peripheral pulses  Musculoskeletal: 5/5 strength b/l upper and lower extremities  Skin: No rashes  Breast: Deferred  Rectal: Deferred    MEDICATIONS  (STANDING):  atorvastatin 10 milliGRAM(s) Oral at bedtime  budesonide 160 MICROgram(s)/formoterol 4.5 MICROgram(s) Inhaler 2 Puff(s) Inhalation two times a day  cefTRIAXone Injectable.      cefTRIAXone Injectable. 1000 milliGRAM(s) IV Push every 24 hours  diltiazem    milliGRAM(s) Oral daily  enoxaparin Injectable 40 milliGRAM(s) SubCutaneous daily  folic acid 1 milliGRAM(s) Oral daily  furosemide   Injectable 40 milliGRAM(s) IV Push daily  lisinopril 10 milliGRAM(s) Oral daily  metoprolol tartrate 25 milliGRAM(s) Oral two times a day  potassium chloride    Tablet ER 20 milliEquivalent(s) Oral daily  tiotropium 18 MICROgram(s) Capsule 1 Capsule(s) Inhalation daily    Lab Results:  CBC  CBC Full  -  ( 11 Aug 2021 09:10 )  WBC Count : 12.59 K/uL  RBC Count : 3.79 M/uL  Hemoglobin : 13.0 g/dL  Hematocrit : 37.4 %  Platelet Count - Automated : 273 K/uL  Mean Cell Volume : 98.7 fl  Mean Cell Hemoglobin : 34.3 pg  Mean Cell Hemoglobin Concentration : 34.8 gm/dL  Auto Neutrophil # : x  Auto Lymphocyte # : x  Auto Monocyte # : x  Auto Eosinophil # : x  Auto Basophil # : x  Auto Neutrophil % : x  Auto Lymphocyte % : x  Auto Monocyte % : x  Auto Eosinophil % : x  Auto Basophil % : x    .		Differential:	[] Automated		[] Manual  Chemistry                        13.0   12.59 )-----------( 273      ( 11 Aug 2021 09:10 )             37.4     08-11    143  |  105  |  26<H>  ----------------------------<  109<H>  2.5<LL>   |  32<H>  |  1.08    Ca    8.2<L>      11 Aug 2021 09:10  Phos  2.3     08-11  Mg     1.8     08-11    TPro  6.0  /  Alb  2.1<L>  /  TBili  1.7<H>  /  DBili  x   /  AST  18  /  ALT  17  /  AlkPhos  82  08-11    LIVER FUNCTIONS - ( 11 Aug 2021 09:10 )  Alb: 2.1 g/dL / Pro: 6.0 gm/dL / ALK PHOS: 82 U/L / ALT: 17 U/L / AST: 18 U/L / GGT: x             MICROBIOLOGY/CULTURES:  Culture Results:   GI PCR Results: NOT detected  *******Please Note:*******  GI panel PCR evaluates for:  Campylobacter, Plesiomonas shigelloides, Salmonella,  Vibrio, Yersinia enterocolitica, Enteroaggregative  Escherichia coli (EAEC), Enteropathogenic E.coli (EPEC),  Enterotoxigenic E. coli (ETEC) lt/st, Shiga-like  toxin-producing E. coli (STEC) stx1/stx2,  Shigella/ Enteroinvasive E. coli (EIEC), Cryptosporidium,  Cyclospora cayetanensis, Entamoeba histolytica,  Giardia lamblia, Adenovirus F 40/41, Astrovirus,  Norovirus GI/GII, Rotavirus A, Sapovirus (08-10 @ 21:28)  Culture Results:   No growth to date. (08-09 @ 14:46)      RADIOLOGY/EKG:    x< from: Xray Chest 1 View- PORTABLE-Urgent (08.09.21 @ 14:57) >    IMPRESSION: Linear atelectases presently seen at right base.    < end of copied text >      < from: US Duplex Venous Lower Ext Complete, Bilateral (08.09.21 @ 19:33) >    IMPRESSION:  No evidence of deep venous thrombosis in either lower extremity.      < end of copied text >    x< from: Xray Knee 1 or 2 Views, Right (08.10.21 @ 17:00) >    IMPRESSION: Sizable knee effusion. Degeneration as above.      < end of copied text >

## 2021-08-23 NOTE — DISCHARGE NOTE NURSING/CASE MANAGEMENT/SOCIAL WORK - NSTOBACCONEVERSMOKERY/N_GEN_A
Bedside shift change report given to Vasquez Ceron (oncoming nurse) by Phyllis Mcfadden (offgoing nurse). Report included the following information SBAR, Kardex, Recent Results and Cardiac Rhythm NSR. Yes

## 2021-08-27 DIAGNOSIS — K52.9 NONINFECTIVE GASTROENTERITIS AND COLITIS, UNSPECIFIED: ICD-10-CM

## 2021-08-27 DIAGNOSIS — I10 ESSENTIAL (PRIMARY) HYPERTENSION: ICD-10-CM

## 2021-08-27 DIAGNOSIS — E78.5 HYPERLIPIDEMIA, UNSPECIFIED: ICD-10-CM

## 2021-08-27 DIAGNOSIS — Z91.14 PATIENT'S OTHER NONCOMPLIANCE WITH MEDICATION REGIMEN: ICD-10-CM

## 2021-08-27 DIAGNOSIS — J44.9 CHRONIC OBSTRUCTIVE PULMONARY DISEASE, UNSPECIFIED: ICD-10-CM

## 2021-08-27 DIAGNOSIS — M10.9 GOUT, UNSPECIFIED: ICD-10-CM

## 2021-08-27 DIAGNOSIS — Z87.891 PERSONAL HISTORY OF NICOTINE DEPENDENCE: ICD-10-CM

## 2021-08-27 DIAGNOSIS — B35.1 TINEA UNGUIUM: ICD-10-CM

## 2021-08-27 DIAGNOSIS — A41.9 SEPSIS, UNSPECIFIED ORGANISM: ICD-10-CM

## 2021-08-27 DIAGNOSIS — L03.115 CELLULITIS OF RIGHT LOWER LIMB: ICD-10-CM

## 2021-08-27 DIAGNOSIS — E87.6 HYPOKALEMIA: ICD-10-CM

## 2021-09-03 ENCOUNTER — INPATIENT (INPATIENT)
Facility: HOSPITAL | Age: 81
LOS: 4 days | Discharge: NURSING, REHAB OR LONG-TERM CARE | DRG: 308 | End: 2021-09-08
Attending: INTERNAL MEDICINE | Admitting: INTERNAL MEDICINE
Payer: MEDICARE

## 2021-09-03 VITALS
HEART RATE: 128 BPM | HEIGHT: 75 IN | WEIGHT: 184.97 LBS | RESPIRATION RATE: 24 BRPM | DIASTOLIC BLOOD PRESSURE: 75 MMHG | OXYGEN SATURATION: 87 % | SYSTOLIC BLOOD PRESSURE: 114 MMHG | TEMPERATURE: 98 F

## 2021-09-03 DIAGNOSIS — I48.91 UNSPECIFIED ATRIAL FIBRILLATION: ICD-10-CM

## 2021-09-03 LAB
ALBUMIN SERPL ELPH-MCNC: 2.5 G/DL — LOW (ref 3.3–5)
ALP SERPL-CCNC: 147 U/L — HIGH (ref 30–120)
ALT FLD-CCNC: 76 U/L DA — HIGH (ref 10–60)
AMMONIA BLD-MCNC: 29 UMOL/L — SIGNIFICANT CHANGE UP (ref 11–32)
ANION GAP SERPL CALC-SCNC: 13 MMOL/L — SIGNIFICANT CHANGE UP (ref 5–17)
APPEARANCE UR: CLEAR — SIGNIFICANT CHANGE UP
AST SERPL-CCNC: 32 U/L — SIGNIFICANT CHANGE UP (ref 10–40)
BACTERIA # UR AUTO: ABNORMAL
BASOPHILS # BLD AUTO: 0.01 K/UL — SIGNIFICANT CHANGE UP (ref 0–0.2)
BASOPHILS NFR BLD AUTO: 0.1 % — SIGNIFICANT CHANGE UP (ref 0–2)
BILIRUB SERPL-MCNC: 0.8 MG/DL — SIGNIFICANT CHANGE UP (ref 0.2–1.2)
BILIRUB UR-MCNC: NEGATIVE — SIGNIFICANT CHANGE UP
BUN SERPL-MCNC: 79 MG/DL — HIGH (ref 7–23)
CALCIUM SERPL-MCNC: 8.4 MG/DL — SIGNIFICANT CHANGE UP (ref 8.4–10.5)
CHLORIDE SERPL-SCNC: 110 MMOL/L — HIGH (ref 96–108)
CK SERPL-CCNC: 46 U/L — SIGNIFICANT CHANGE UP (ref 39–308)
CO2 SERPL-SCNC: 20 MMOL/L — LOW (ref 22–31)
COLOR SPEC: YELLOW — SIGNIFICANT CHANGE UP
CREAT SERPL-MCNC: 2.54 MG/DL — HIGH (ref 0.5–1.3)
D DIMER BLD IA.RAPID-MCNC: HIGH NG/ML DDU
DIFF PNL FLD: ABNORMAL
EOSINOPHIL # BLD AUTO: 0.06 K/UL — SIGNIFICANT CHANGE UP (ref 0–0.5)
EOSINOPHIL NFR BLD AUTO: 0.4 % — SIGNIFICANT CHANGE UP (ref 0–6)
EPI CELLS # UR: SIGNIFICANT CHANGE UP
ERYTHROCYTE [SEDIMENTATION RATE] IN BLOOD: 18 MM/HR — HIGH (ref 0–9)
GLUCOSE SERPL-MCNC: 142 MG/DL — HIGH (ref 70–99)
GLUCOSE UR QL: NEGATIVE MG/DL — SIGNIFICANT CHANGE UP
HCT VFR BLD CALC: 42.1 % — SIGNIFICANT CHANGE UP (ref 39–50)
HGB BLD-MCNC: 13.7 G/DL — SIGNIFICANT CHANGE UP (ref 13–17)
IMM GRANULOCYTES NFR BLD AUTO: 0.9 % — SIGNIFICANT CHANGE UP (ref 0–1.5)
KETONES UR-MCNC: NEGATIVE — SIGNIFICANT CHANGE UP
LACTATE SERPL-SCNC: 2.1 MMOL/L — HIGH (ref 0.7–2)
LACTATE SERPL-SCNC: 2.3 MMOL/L — HIGH (ref 0.7–2)
LEUKOCYTE ESTERASE UR-ACNC: ABNORMAL
LYMPHOCYTES # BLD AUTO: 1.34 K/UL — SIGNIFICANT CHANGE UP (ref 1–3.3)
LYMPHOCYTES # BLD AUTO: 8.6 % — LOW (ref 13–44)
MAGNESIUM SERPL-MCNC: 1.7 MG/DL — SIGNIFICANT CHANGE UP (ref 1.6–2.6)
MCHC RBC-ENTMCNC: 32.5 GM/DL — SIGNIFICANT CHANGE UP (ref 32–36)
MCHC RBC-ENTMCNC: 33.6 PG — SIGNIFICANT CHANGE UP (ref 27–34)
MCV RBC AUTO: 103.2 FL — HIGH (ref 80–100)
MONOCYTES # BLD AUTO: 1.08 K/UL — HIGH (ref 0–0.9)
MONOCYTES NFR BLD AUTO: 6.9 % — SIGNIFICANT CHANGE UP (ref 2–14)
NEUTROPHILS # BLD AUTO: 12.97 K/UL — HIGH (ref 1.8–7.4)
NEUTROPHILS NFR BLD AUTO: 83.1 % — HIGH (ref 43–77)
NITRITE UR-MCNC: NEGATIVE — SIGNIFICANT CHANGE UP
NRBC # BLD: 0 /100 WBCS — SIGNIFICANT CHANGE UP (ref 0–0)
NT-PROBNP SERPL-SCNC: 1086 PG/ML — HIGH (ref 0–450)
PH UR: 5 — SIGNIFICANT CHANGE UP (ref 5–8)
PLATELET # BLD AUTO: 170 K/UL — SIGNIFICANT CHANGE UP (ref 150–400)
POTASSIUM SERPL-MCNC: 5.6 MMOL/L — HIGH (ref 3.5–5.3)
POTASSIUM SERPL-SCNC: 5.6 MMOL/L — HIGH (ref 3.5–5.3)
PROT SERPL-MCNC: 6.1 G/DL — SIGNIFICANT CHANGE UP (ref 6–8.3)
PROT UR-MCNC: 30 MG/DL
RBC # BLD: 4.08 M/UL — LOW (ref 4.2–5.8)
RBC # FLD: 13.7 % — SIGNIFICANT CHANGE UP (ref 10.3–14.5)
RBC CASTS # UR COMP ASSIST: >50 /HPF (ref 0–4)
SARS-COV-2 RNA SPEC QL NAA+PROBE: SIGNIFICANT CHANGE UP
SODIUM SERPL-SCNC: 143 MMOL/L — SIGNIFICANT CHANGE UP (ref 135–145)
SP GR SPEC: 1.01 — SIGNIFICANT CHANGE UP (ref 1.01–1.02)
TROPONIN I SERPL-MCNC: 0 NG/ML — LOW (ref 0.02–0.06)
UROBILINOGEN FLD QL: NEGATIVE MG/DL — SIGNIFICANT CHANGE UP
WBC # BLD: 15.6 K/UL — HIGH (ref 3.8–10.5)
WBC # FLD AUTO: 15.6 K/UL — HIGH (ref 3.8–10.5)
WBC UR QL: SIGNIFICANT CHANGE UP

## 2021-09-03 PROCEDURE — 93010 ELECTROCARDIOGRAM REPORT: CPT

## 2021-09-03 PROCEDURE — 99284 EMERGENCY DEPT VISIT MOD MDM: CPT

## 2021-09-03 PROCEDURE — 93970 EXTREMITY STUDY: CPT | Mod: 26

## 2021-09-03 PROCEDURE — 93306 TTE W/DOPPLER COMPLETE: CPT | Mod: 26

## 2021-09-03 PROCEDURE — 70450 CT HEAD/BRAIN W/O DYE: CPT | Mod: 26

## 2021-09-03 PROCEDURE — 71045 X-RAY EXAM CHEST 1 VIEW: CPT | Mod: 26

## 2021-09-03 PROCEDURE — 99223 1ST HOSP IP/OBS HIGH 75: CPT

## 2021-09-03 RX ORDER — INFLUENZA VIRUS VACCINE 15; 15; 15; 15 UG/.5ML; UG/.5ML; UG/.5ML; UG/.5ML
0.5 SUSPENSION INTRAMUSCULAR ONCE
Refills: 0 | Status: DISCONTINUED | OUTPATIENT
Start: 2021-09-03 | End: 2021-09-08

## 2021-09-03 RX ORDER — METOPROLOL TARTRATE 50 MG
25 TABLET ORAL
Refills: 0 | Status: DISCONTINUED | OUTPATIENT
Start: 2021-09-03 | End: 2021-09-08

## 2021-09-03 RX ORDER — ACETAMINOPHEN 500 MG
650 TABLET ORAL EVERY 6 HOURS
Refills: 0 | Status: DISCONTINUED | OUTPATIENT
Start: 2021-09-03 | End: 2021-09-08

## 2021-09-03 RX ORDER — NYSTATIN CREAM 100000 [USP'U]/G
1 CREAM TOPICAL
Refills: 0 | Status: DISCONTINUED | OUTPATIENT
Start: 2021-09-03 | End: 2021-09-08

## 2021-09-03 RX ORDER — SODIUM CHLORIDE 9 MG/ML
500 INJECTION INTRAMUSCULAR; INTRAVENOUS; SUBCUTANEOUS ONCE
Refills: 0 | Status: COMPLETED | OUTPATIENT
Start: 2021-09-03 | End: 2021-09-03

## 2021-09-03 RX ORDER — TIOTROPIUM BROMIDE 18 UG/1
1 CAPSULE ORAL; RESPIRATORY (INHALATION) DAILY
Refills: 0 | Status: DISCONTINUED | OUTPATIENT
Start: 2021-09-03 | End: 2021-09-08

## 2021-09-03 RX ORDER — ENOXAPARIN SODIUM 100 MG/ML
85 INJECTION SUBCUTANEOUS ONCE
Refills: 0 | Status: COMPLETED | OUTPATIENT
Start: 2021-09-03 | End: 2021-09-03

## 2021-09-03 RX ORDER — ATORVASTATIN CALCIUM 80 MG/1
10 TABLET, FILM COATED ORAL AT BEDTIME
Refills: 0 | Status: DISCONTINUED | OUTPATIENT
Start: 2021-09-03 | End: 2021-09-08

## 2021-09-03 RX ORDER — SODIUM CHLORIDE 9 MG/ML
1000 INJECTION INTRAMUSCULAR; INTRAVENOUS; SUBCUTANEOUS ONCE
Refills: 0 | Status: COMPLETED | OUTPATIENT
Start: 2021-09-03 | End: 2021-09-03

## 2021-09-03 RX ORDER — SODIUM CHLORIDE 9 MG/ML
1000 INJECTION INTRAMUSCULAR; INTRAVENOUS; SUBCUTANEOUS
Refills: 0 | Status: DISCONTINUED | OUTPATIENT
Start: 2021-09-03 | End: 2021-09-04

## 2021-09-03 RX ORDER — BUDESONIDE AND FORMOTEROL FUMARATE DIHYDRATE 160; 4.5 UG/1; UG/1
2 AEROSOL RESPIRATORY (INHALATION)
Refills: 0 | Status: DISCONTINUED | OUTPATIENT
Start: 2021-09-03 | End: 2021-09-08

## 2021-09-03 RX ADMIN — SODIUM CHLORIDE 500 MILLILITER(S): 9 INJECTION INTRAMUSCULAR; INTRAVENOUS; SUBCUTANEOUS at 13:30

## 2021-09-03 RX ADMIN — ENOXAPARIN SODIUM 85 MILLIGRAM(S): 100 INJECTION SUBCUTANEOUS at 13:53

## 2021-09-03 RX ADMIN — NYSTATIN CREAM 1 APPLICATION(S): 100000 CREAM TOPICAL at 17:54

## 2021-09-03 RX ADMIN — ATORVASTATIN CALCIUM 10 MILLIGRAM(S): 80 TABLET, FILM COATED ORAL at 21:04

## 2021-09-03 RX ADMIN — SODIUM CHLORIDE 500 MILLILITER(S): 9 INJECTION INTRAMUSCULAR; INTRAVENOUS; SUBCUTANEOUS at 21:05

## 2021-09-03 RX ADMIN — TIOTROPIUM BROMIDE 1 CAPSULE(S): 18 CAPSULE ORAL; RESPIRATORY (INHALATION) at 17:54

## 2021-09-03 RX ADMIN — BUDESONIDE AND FORMOTEROL FUMARATE DIHYDRATE 2 PUFF(S): 160; 4.5 AEROSOL RESPIRATORY (INHALATION) at 19:44

## 2021-09-03 RX ADMIN — SODIUM CHLORIDE 100 MILLILITER(S): 9 INJECTION INTRAMUSCULAR; INTRAVENOUS; SUBCUTANEOUS at 21:04

## 2021-09-03 RX ADMIN — Medication 25 MILLIGRAM(S): at 12:22

## 2021-09-03 RX ADMIN — SODIUM CHLORIDE 500 MILLILITER(S): 9 INJECTION INTRAMUSCULAR; INTRAVENOUS; SUBCUTANEOUS at 12:22

## 2021-09-03 RX ADMIN — SODIUM CHLORIDE 1000 MILLILITER(S): 9 INJECTION INTRAMUSCULAR; INTRAVENOUS; SUBCUTANEOUS at 23:57

## 2021-09-03 RX ADMIN — SODIUM CHLORIDE 100 MILLILITER(S): 9 INJECTION INTRAMUSCULAR; INTRAVENOUS; SUBCUTANEOUS at 16:28

## 2021-09-03 NOTE — ED ADULT NURSE NOTE - NSICDXPASTMEDICALHX_GEN_ALL_CORE_FT
PAST MEDICAL HISTORY:  Cellulitis Right lower leg    COPD exacerbation     Gout     H/O ulcerative colitis     History of hemochromatosis     History of hemochromatosis     HTN (hypertension)     Hyperlipidemia

## 2021-09-03 NOTE — ED ADULT NURSE NOTE - OBJECTIVE STATEMENT
pt comes to ED sent from [facility] for period of unresponsiveness, no trauma noted. pt denies fall, noted to be in Afib - 114-130 BPM Dr. [facility] at bedside for eval, Pt appears tachypneic but denies any cp/sob/palp in ED. Pt states feeling well. Pt denies abd pain, n/v/d. pt denies hx of covid vaccine. pt MAEx4, neuro Intact, AOx4, will cont to monitor.

## 2021-09-03 NOTE — CONSULT NOTE ADULT - ASSESSMENT
The patient is an 80 year old male with a history of HTN, HL, COPD, hemochromatosis who presents with syncope and rapid atrial fibrillation.    Plan:  - ECG and telemetry with new atrial fibrillation - mildly tachycardic in the 110-120 range  - BPs borderline in 90s systolic  - Will given 500 cc bolus  - Give metoprolol tartrate 25 mg now and continue bid  - Continue diltiazem  mg daily  - Hold lisinopril  - Hold furosemide  - If BP remains borderline, will hold diltiazem and start digoxin  - Check echo  - Monitor on telemetry  - CXR with grossly clear lungs  - Check one set of cardiac enzymes  - Check BNP The patient is an 80 year old male with a history of HTN, HL, COPD, hemochromatosis who presents with syncope and rapid atrial fibrillation.    Plan:  - ECG and telemetry with new atrial fibrillation - mildly tachycardic in the 110-120 range  - BPs borderline in 90s systolic  - Will given 500 cc bolus  - Give metoprolol tartrate 25 mg now and continue bid  - Continue diltiazem  mg daily  - Hold lisinopril  - Hold furosemide  - If BP remains borderline, will hold diltiazem and start digoxin  - Check echo  - Monitor on telemetry  - CXR with grossly clear lungs  - Check one set of cardiac enzymes  - Check BNP  - Will likely start apixaban 5 mg bid but will await labs prior to starting

## 2021-09-03 NOTE — CONSULT NOTE ADULT - ASSESSMENT
80 year old male with a history of HTN, HL, COPD, hemochromatosis who presents with syncope and rapid atrial fibrillation.    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

## 2021-09-03 NOTE — PROVIDER CONTACT NOTE (CRITICAL VALUE NOTIFICATION) - ASSESSMENT
AxO x4, +fatigue. 02 NC 3L in place, no wheezing, no SOB currently. Denies chest pain and nausea/vomitting. Incontinent of urine, large amounts. Afebrile
AxO x4. Afib per tele, rate below 100. 02 3L via NC, breathing unlabored, current RR 16. Afebrile. Voiding small amounts dark yellow urine. Denies chest pain.

## 2021-09-03 NOTE — CHART NOTE - NSCHARTNOTEFT_GEN_A_CORE
Called about elevated lactate.  Patient admitted for syncope and new onset afib.  Despite leukocytosis, patient's last vitals are all wnl.  No fever, tachycardia, and RR<20.  Patient therefore does not meet the definition of sepsis.  Will give some fluids for now and repeat lactate.
Called again for elevated lactate, now 2.3.  Patient still with WNL vitals signs (no fevers, no tachypnea still).  No complaints of any pain.   May be still behind in fluids.  Will bolus 1L of NS and recheck lactate again.

## 2021-09-03 NOTE — H&P ADULT - NSHPPHYSICALEXAM_GEN_ALL_CORE
PHYSICAL EXAM:  Vital Signs Last 24 Hrs  T(C): 37.1 (03 Sep 2021 11:30), Max: 37.1 (03 Sep 2021 11:30)  T(F): 98.7 (03 Sep 2021 11:30), Max: 98.7 (03 Sep 2021 11:30)  HR: 95 (03 Sep 2021 13:45) (95 - 128)  BP: 124/82 (03 Sep 2021 13:45) (114/75 - 124/82)  BP(mean): --  RR: 20 (03 Sep 2021 13:45) (20 - 24)  SpO2: 97% (03 Sep 2021 13:45) (87% - 97%)    GENERAL: NAD, well-groomed, well-developed  HEAD:  Atraumatic, Normocephalic  EYES: EOMI, PERRLA, conjunctiva and sclera clear  ENMT: No tonsillar erythema, exudates, or enlargement; Moist mucous membranes, Good dentition, No lesions  NECK: Supple, No JVD, Normal thyroid  CHEST/LUNG: Clear to auscultation bilaterally; No rales, rhonchi, wheezing, or rubs  HEART: Regular rate and rhythm; No murmurs, rubs, or gallops  ABDOMEN: Soft, Nontender, Nondistended; Bowel sounds present  EXTREMITIES:  2+ Peripheral Edema up to knee B/L.  RLE with ulceration but no drainage.   LYMPH: No lymphadenopathy noted

## 2021-09-03 NOTE — PROVIDER CONTACT NOTE (CRITICAL VALUE NOTIFICATION) - RECOMMENDATIONS
Give 1 liter saline bolus x1 hr, repeat lactate at 0300
Dr. Sanchez will evaluate and render further orders if necessary

## 2021-09-03 NOTE — H&P ADULT - HISTORY OF PRESENT ILLNESS
80M HTN, HLD, COPD, Hemachromatosis who was in Rehab after being admitted in [facility] from 8/9 thru 8/23 for Crystal Arthropathy and RLE Cellulitis treated with Antibiotics and Steroids. Patient today was reported to have syncopized, where patient does not recall the details of, but does state that for the past 2 days has been not feeling well and increasing SOB.  No chest pain or palpitations. No Fevers or chills. No nausea or vomiting.      In the ED routine labs and imaging done.  Was found to be in Atrial Fibrillation with RVR, Acute Renal Failure, and elevated D. Dimer.  EKG revealed Atrial Fibrillation with 's. GIven IVF and Metoprolol initially.  Patient also hypoxic and placed on 3L NC. Then was started on Digoxin and admitted for further management.

## 2021-09-03 NOTE — ED ADULT NURSE NOTE - NSFALLRSKHARMRISK_ED_ALL_ED
29 y/m with no PMH presents with chief complain of difficulty speaking that started around 9 pm yesterday, describes as "stuttering and delayed speech" lasted for around 30-60 minutes, he also had numbness of the right side of the body, above the level of hip, no weakness, had feeling of soreness of throat but no swallowing difficulty, no focal weakness, felt his vision was somewhat changed during the episode but cannot tell what exactly, denies double vision, no facial deviation, no chest pain, belly pain, shortness of breath, no change in bowel or bladder habits, fever, cough, belly pain. This episode lasted around 30-60 minutes, no symptoms on presentation to ED. He denies any history of stroke or similar episode in the past, except for occasional tingling.
yes

## 2021-09-03 NOTE — ED PROVIDER NOTE - CHPI ED SYMPTOMS NEG
no back pain/no cough/no dizziness/no fever/no shortness of breath/no vomiting/no chills/no diaphoresis

## 2021-09-03 NOTE — ED PROVIDER NOTE - OBJECTIVE STATEMENT
79 yo M p/w w/ hx COPD, HTN was BIB EMS from SNF for episode of unresponsiveness today - ~ 5 min , no fall. EMS was called and pt found in new onset rapid AF. No cp/sob/palp now. Pt states feeling well, except for some on / off slight dizziness. no abd pain. no n/v/d. no recent illness. no cough/ uri. no agg/allev factors. no other inj or co. Pt NOT yet vaccinated for covid. no exposures. no other acute co.

## 2021-09-03 NOTE — H&P ADULT - NSHPLABSRESULTS_GEN_ALL_CORE
Labs:                          13.7   15.60 )-----------( 170      ( 03 Sep 2021 12:07 )             42.1           143  |  110<H>  |  79<H>  ----------------------------<  142<H>  5.6<H>   |  20<L>  |  2.54<H>    Ca    8.4      03 Sep 2021 12:07  Mg     1.7         TPro  6.1  /  Alb  2.5<L>  /  TBili  0.8  /  DBili  x   /  AST  32  /  ALT  76<H>  /  AlkPhos  147<H>                Urinalysis Basic - ( 03 Sep 2021 12:07 )    Color: Yellow / Appearance: Clear / S.015 / pH: x  Gluc: x / Ketone: Negative  / Bili: Negative / Urobili: Negative mg/dL   Blood: x / Protein: 30 mg/dL / Nitrite: Negative   Leuk Esterase: Small / RBC: >50 /HPF / WBC 0-2   Sq Epi: x / Non Sq Epi: Few / Bacteria: Few            Lactate Trend      CARDIAC MARKERS ( 03 Sep 2021 12:07 )  .000 ng/mL / x     / 46 U/L / x     / x            CAPILLARY BLOOD GLUCOSE          EKG:   Personally Reviewed:  [ ] YES     Imaging:   Personally Reviewed:  [ ] YES

## 2021-09-03 NOTE — H&P ADULT - ASSESSMENT
80M HTN, HLD, COPD, Hemachromatosis admitted for Syncope and New onset Atrial Fibrillation with RVR.     Syncope / Atrial Fibrillation RVR - New Onset. BP soft and started on Digoxin. Continue Metoprolol and Cardizem. Given therapeutic Lovenox and will start Eliquis for AC.  Check TSH and Echocardiogram.  Cardiology consult noted.     Elevated D. Dimer - CT-A contraindicated due to renal failure. V/Q not reliable due to COPD. Check Venous Dopplers. Elevation possible from recent infection or renal failure. Given therapeutic Lovenox.     HTN / HLD - Hold ACE-I and Diuretics due to renal failure. Continue Cardizem, Metoprolol and Statin    COPD - Advair, Albuterol and Spiriva     Diet - Regular    DVT Prophylaxis - Eliquis    Disposition - Discharge planning pending hospital course      80M HTN, HLD, COPD, Hemachromatosis admitted for Syncope and New onset Atrial Fibrillation with RVR.     Syncope / Atrial Fibrillation RVR - New Onset. BP soft and started on Digoxin. Continue Metoprolol and Cardizem. Given therapeutic Lovenox and will start Eliquis for AC.  Check TSH and Echocardiogram.  Cardiology consult noted.     Elevated D. Dimer - CT-A contraindicated due to renal failure. V/Q not reliable due to COPD. Check Venous Dopplers. Elevation possible from recent infection or renal failure. Given therapeutic Lovenox.     Acute Renal Failure - Most likely from dehydration and diuretic use. Give IV Fluids and monitor Lytes.     HTN / HLD - Hold ACE-I and Diuretics due to renal failure. Continue Cardizem, Metoprolol and Statin    COPD - Advair, Albuterol and Spiriva     Diet - Regular    DVT Prophylaxis - Eliquis    Disposition - Discharge planning pending hospital course      80M HTN, HLD, COPD, Hemachromatosis admitted for Syncope and New onset Atrial Fibrillation with RVR.     Syncope / Atrial Fibrillation RVR - New Onset. BP soft and started on Digoxin. Continue Metoprolol and Cardizem. Given therapeutic Lovenox and will start Eliquis for AC.  Check TSH and Echocardiogram.  Cardiology consult noted.     Elevated D. Dimer - CT-A contraindicated due to renal failure. V/Q not reliable due to COPD. Check Venous Dopplers. Elevation possible from recent infection or renal failure. Given therapeutic Lovenox.     Acute Renal Failure - Most likely from dehydration and diuretic use. Baseline CR= 1.18. Give IV Fluids and monitor Lytes.     HTN / HLD - Hold ACE-I and Diuretics due to renal failure. Continue Cardizem, Metoprolol and Statin    COPD - Advair, Albuterol and Spiriva     Diet - Regular    DVT Prophylaxis - Eliquis    Disposition - Discharge planning pending hospital course     Recent Admission in [facility] 8/11/21 thru 8/23/21.  Had Arthrocentesis 8/10/21 and found to have MSU and CCP crystals. Treated with Steroids.  Also found to have RLE Cellulitis and treated with Abx. Discharged to rehab 8/23.

## 2021-09-04 LAB
ANION GAP SERPL CALC-SCNC: 8 MMOL/L — SIGNIFICANT CHANGE UP (ref 5–17)
APPEARANCE UR: ABNORMAL
BILIRUB UR-MCNC: ABNORMAL
BUN SERPL-MCNC: 65 MG/DL — HIGH (ref 7–23)
CALCIUM SERPL-MCNC: 8.1 MG/DL — LOW (ref 8.4–10.5)
CHLORIDE SERPL-SCNC: 115 MMOL/L — HIGH (ref 96–108)
CO2 SERPL-SCNC: 23 MMOL/L — SIGNIFICANT CHANGE UP (ref 22–31)
COLOR SPEC: ABNORMAL
COVID-19 SPIKE DOMAIN AB INTERP: NEGATIVE — SIGNIFICANT CHANGE UP
COVID-19 SPIKE DOMAIN ANTIBODY RESULT: 0.4 U/ML — SIGNIFICANT CHANGE UP
CREAT SERPL-MCNC: 2.08 MG/DL — HIGH (ref 0.5–1.3)
CRP SERPL-MCNC: 43 MG/L — HIGH
DIFF PNL FLD: ABNORMAL
GLUCOSE SERPL-MCNC: 106 MG/DL — HIGH (ref 70–99)
GLUCOSE UR QL: NEGATIVE MG/DL — SIGNIFICANT CHANGE UP
HCT VFR BLD CALC: 36.2 % — LOW (ref 39–50)
HGB BLD-MCNC: 11.8 G/DL — LOW (ref 13–17)
KETONES UR-MCNC: ABNORMAL
LACTATE SERPL-SCNC: 1.4 MMOL/L — SIGNIFICANT CHANGE UP (ref 0.7–2)
LEUKOCYTE ESTERASE UR-ACNC: ABNORMAL
MCHC RBC-ENTMCNC: 32.6 GM/DL — SIGNIFICANT CHANGE UP (ref 32–36)
MCHC RBC-ENTMCNC: 33.3 PG — SIGNIFICANT CHANGE UP (ref 27–34)
MCV RBC AUTO: 102.3 FL — HIGH (ref 80–100)
NITRITE UR-MCNC: POSITIVE
NRBC # BLD: 0 /100 WBCS — SIGNIFICANT CHANGE UP (ref 0–0)
PH UR: 6.5 — SIGNIFICANT CHANGE UP (ref 5–8)
PHOSPHATE SERPL-MCNC: 5 MG/DL — HIGH (ref 2.5–4.5)
PLATELET # BLD AUTO: 143 K/UL — LOW (ref 150–400)
POTASSIUM SERPL-MCNC: 5.4 MMOL/L — HIGH (ref 3.5–5.3)
POTASSIUM SERPL-SCNC: 5.4 MMOL/L — HIGH (ref 3.5–5.3)
PREALB SERPL-MCNC: 16 MG/DL — LOW (ref 20–40)
PROT UR-MCNC: 100 MG/DL
RBC # BLD: 3.54 M/UL — LOW (ref 4.2–5.8)
RBC # FLD: 13.9 % — SIGNIFICANT CHANGE UP (ref 10.3–14.5)
SARS-COV-2 IGG+IGM SERPL QL IA: 0.4 U/ML — SIGNIFICANT CHANGE UP
SARS-COV-2 IGG+IGM SERPL QL IA: NEGATIVE — SIGNIFICANT CHANGE UP
SODIUM SERPL-SCNC: 146 MMOL/L — HIGH (ref 135–145)
SP GR SPEC: 1.01 — SIGNIFICANT CHANGE UP (ref 1.01–1.02)
TSH SERPL-MCNC: 2.23 UIU/ML — SIGNIFICANT CHANGE UP (ref 0.27–4.2)
UROBILINOGEN FLD QL: 1 MG/DL
WBC # BLD: 11.74 K/UL — HIGH (ref 3.8–10.5)
WBC # FLD AUTO: 11.74 K/UL — HIGH (ref 3.8–10.5)

## 2021-09-04 PROCEDURE — 74176 CT ABD & PELVIS W/O CONTRAST: CPT | Mod: 26

## 2021-09-04 PROCEDURE — 71250 CT THORAX DX C-: CPT | Mod: 26

## 2021-09-04 PROCEDURE — 99232 SBSQ HOSP IP/OBS MODERATE 35: CPT

## 2021-09-04 PROCEDURE — 76775 US EXAM ABDO BACK WALL LIM: CPT | Mod: 26

## 2021-09-04 PROCEDURE — 93925 LOWER EXTREMITY STUDY: CPT | Mod: 26

## 2021-09-04 RX ORDER — SODIUM CHLORIDE 9 MG/ML
1000 INJECTION INTRAMUSCULAR; INTRAVENOUS; SUBCUTANEOUS
Refills: 0 | Status: DISCONTINUED | OUTPATIENT
Start: 2021-09-04 | End: 2021-09-04

## 2021-09-04 RX ORDER — APIXABAN 2.5 MG/1
5 TABLET, FILM COATED ORAL
Refills: 0 | Status: DISCONTINUED | OUTPATIENT
Start: 2021-09-04 | End: 2021-09-08

## 2021-09-04 RX ORDER — SODIUM CHLORIDE 9 MG/ML
1000 INJECTION, SOLUTION INTRAVENOUS
Refills: 0 | Status: DISCONTINUED | OUTPATIENT
Start: 2021-09-04 | End: 2021-09-06

## 2021-09-04 RX ADMIN — Medication 25 MILLIGRAM(S): at 05:47

## 2021-09-04 RX ADMIN — SODIUM CHLORIDE 100 MILLILITER(S): 9 INJECTION, SOLUTION INTRAVENOUS at 12:33

## 2021-09-04 RX ADMIN — NYSTATIN CREAM 1 APPLICATION(S): 100000 CREAM TOPICAL at 05:47

## 2021-09-04 RX ADMIN — Medication 25 MILLIGRAM(S): at 17:19

## 2021-09-04 RX ADMIN — NYSTATIN CREAM 1 APPLICATION(S): 100000 CREAM TOPICAL at 17:19

## 2021-09-04 RX ADMIN — APIXABAN 5 MILLIGRAM(S): 2.5 TABLET, FILM COATED ORAL at 17:19

## 2021-09-04 RX ADMIN — TIOTROPIUM BROMIDE 1 CAPSULE(S): 18 CAPSULE ORAL; RESPIRATORY (INHALATION) at 05:47

## 2021-09-04 RX ADMIN — BUDESONIDE AND FORMOTEROL FUMARATE DIHYDRATE 2 PUFF(S): 160; 4.5 AEROSOL RESPIRATORY (INHALATION) at 19:20

## 2021-09-04 RX ADMIN — ATORVASTATIN CALCIUM 10 MILLIGRAM(S): 80 TABLET, FILM COATED ORAL at 21:27

## 2021-09-04 RX ADMIN — SODIUM CHLORIDE 50 MILLILITER(S): 9 INJECTION INTRAMUSCULAR; INTRAVENOUS; SUBCUTANEOUS at 10:09

## 2021-09-04 RX ADMIN — BUDESONIDE AND FORMOTEROL FUMARATE DIHYDRATE 2 PUFF(S): 160; 4.5 AEROSOL RESPIRATORY (INHALATION) at 05:47

## 2021-09-04 NOTE — PROGRESS NOTE ADULT - SUBJECTIVE AND OBJECTIVE BOX
Date/Time Patient Seen:  		  Referring MD:   Data Reviewed	       Patient is a 80y old  Male who presents with a chief complaint of Syncope and SOB (03 Sep 2021 16:30)      Subjective/HPI     PAST MEDICAL & SURGICAL HISTORY:  No pertinent past medical history    Gout    Cellulitis  Right lower leg    COPD exacerbation    HTN (hypertension)    H/O ulcerative colitis    Hyperlipidemia    History of hemochromatosis    History of hemochromatosis          Medication list         MEDICATIONS  (STANDING):  atorvastatin 10 milliGRAM(s) Oral at bedtime  budesonide 160 MICROgram(s)/formoterol 4.5 MICROgram(s) Inhaler 2 Puff(s) Inhalation two times a day  influenza   Vaccine 0.5 milliLiter(s) IntraMuscular once  metoprolol tartrate 25 milliGRAM(s) Oral two times a day  nystatin Powder 1 Application(s) Topical two times a day  sodium chloride 0.9%. 1000 milliLiter(s) (100 mL/Hr) IV Continuous <Continuous>  tiotropium 18 MICROgram(s) Capsule 1 Capsule(s) Inhalation daily    MEDICATIONS  (PRN):  acetaminophen   Tablet .. 650 milliGRAM(s) Oral every 6 hours PRN Temp greater or equal to 38C (100.4F), Mild Pain (1 - 3)         Vitals log        ICU Vital Signs Last 24 Hrs  T(C): 36.4 (04 Sep 2021 05:45), Max: 37.1 (03 Sep 2021 11:30)  T(F): 97.6 (04 Sep 2021 05:45), Max: 98.7 (03 Sep 2021 11:30)  HR: 67 (04 Sep 2021 05:45) (60 - 128)  BP: 145/94 (04 Sep 2021 05:45) (105/69 - 145/94)  BP(mean): --  ABP: --  ABP(mean): --  RR: 16 (04 Sep 2021 05:45) (16 - 24)  SpO2: 100% (04 Sep 2021 05:45) (87% - 100%)           Input and Output:  I&O's Detail    03 Sep 2021 07:01  -  04 Sep 2021 06:44  --------------------------------------------------------  IN:    sodium chloride 0.9%: 800 mL    Sodium Chloride 0.9% Bolus: 1000 mL    Sodium Chloride 0.9% Bolus: 500 mL  Total IN: 2300 mL    OUT:    Voided (mL): 300 mL  Total OUT: 300 mL    Total NET: 2000 mL          Lab Data                        13.7   15.60 )-----------( 170      ( 03 Sep 2021 12:07 )             42.1     09-03    143  |  110<H>  |  79<H>  ----------------------------<  142<H>  5.6<H>   |  20<L>  |  2.54<H>    Ca    8.4      03 Sep 2021 12:07  Mg     1.7     09-03    TPro  6.1  /  Alb  2.5<L>  /  TBili  0.8  /  DBili  x   /  AST  32  /  ALT  76<H>  /  AlkPhos  147<H>  09-03      CARDIAC MARKERS ( 03 Sep 2021 12:07 )  .000 ng/mL / x     / 46 U/L / x     / x            Review of Systems	      Objective     Physical Examination    extr edema  chr changes  heart s1s2  lung dec BS  abd soft      Pertinent Lab findings & Imaging      Trae:  NO   Adequate UO     I&O's Detail    03 Sep 2021 07:01  -  04 Sep 2021 06:44  --------------------------------------------------------  IN:    sodium chloride 0.9%: 800 mL    Sodium Chloride 0.9% Bolus: 1000 mL    Sodium Chloride 0.9% Bolus: 500 mL  Total IN: 2300 mL    OUT:    Voided (mL): 300 mL  Total OUT: 300 mL    Total NET: 2000 mL               Discussed with:     Cultures:	        Radiology

## 2021-09-04 NOTE — PROGRESS NOTE ADULT - SUBJECTIVE AND OBJECTIVE BOX
Chief Complaint: Syncope    Interval Events: No events overnight.    Review of Systems:  General: No fevers, chills, weight loss or gain  Skin: No rashes, color changes  Cardiovascular: No chest pain, orthopnea  Respiratory: No shortness of breath, cough  Gastrointestinal: No nausea, abdominal pain  Genitourinary: No incontinence, pain with urination  Musculoskeletal: No pain, swelling, decreased range of motion  Neurological: No headache, weakness  Psychiatric: No depression, anxiety  Endocrine: No weight loss or gain, increased thirst  All other systems are comprehensively negative.    Physical Exam:  Vitals:        Vital Signs Last 24 Hrs  T(C): 36.4 (04 Sep 2021 05:45), Max: 37.1 (03 Sep 2021 11:30)  T(F): 97.6 (04 Sep 2021 05:45), Max: 98.7 (03 Sep 2021 11:30)  HR: 67 (04 Sep 2021 05:45) (60 - 128)  BP: 145/94 (04 Sep 2021 05:45) (105/69 - 145/94)  BP(mean): --  RR: 16 (04 Sep 2021 05:45) (16 - 24)  SpO2: 100% (04 Sep 2021 05:45) (87% - 100%)  General: NAD  HEENT: MMM  Neck: No JVD, no carotid bruit  Lungs: CTAB  CV: RRR, nl S1/S2, no M/R/G  Abdomen: S/NT/ND, +BS  Extremities: No LE edema, no cyanosis  Neuro: AAOx3, non-focal  Skin: No rash    Labs:                        11.8   11.74 )-----------( 143      ( 04 Sep 2021 08:03 )             36.2     09-04    146<H>  |  115<H>  |  65<H>  ----------------------------<  106<H>  5.4<H>   |  23  |  2.08<H>    Ca    8.1<L>      04 Sep 2021 08:03  Phos  5.0     09-04  Mg     1.7     09-03    TPro  6.1  /  Alb  2.5<L>  /  TBili  0.8  /  DBili  x   /  AST  32  /  ALT  76<H>  /  AlkPhos  147<H>  09-03    CARDIAC MARKERS ( 03 Sep 2021 12:07 )  .000 ng/mL / x     / 46 U/L / x     / x              Telemetry:  Chief Complaint: Syncope    Interval Events: No events overnight.    Review of Systems:  General: No fevers, chills, weight loss or gain  Skin: No rashes, color changes  Cardiovascular: No chest pain, orthopnea  Respiratory: No shortness of breath, cough  Gastrointestinal: No nausea, abdominal pain  Genitourinary: No incontinence, pain with urination  Musculoskeletal: No pain, swelling, decreased range of motion  Neurological: No headache, weakness  Psychiatric: No depression, anxiety  Endocrine: No weight loss or gain, increased thirst  All other systems are comprehensively negative.    Physical Exam:  Vitals:        Vital Signs Last 24 Hrs  T(C): 36.4 (04 Sep 2021 05:45), Max: 37.1 (03 Sep 2021 11:30)  T(F): 97.6 (04 Sep 2021 05:45), Max: 98.7 (03 Sep 2021 11:30)  HR: 67 (04 Sep 2021 05:45) (60 - 128)  BP: 145/94 (04 Sep 2021 05:45) (105/69 - 145/94)  BP(mean): --  RR: 16 (04 Sep 2021 05:45) (16 - 24)  SpO2: 100% (04 Sep 2021 05:45) (87% - 100%)  General: NAD  HEENT: MMM  Neck: No JVD, no carotid bruit  Lungs: CTAB  CV: RRR, nl S1/S2, no M/R/G  Abdomen: S/NT/ND, +BS  Extremities: No LE edema, no cyanosis  Neuro: AAOx3, non-focal  Skin: No rash    Labs:                        11.8   11.74 )-----------( 143      ( 04 Sep 2021 08:03 )             36.2     09-04    146<H>  |  115<H>  |  65<H>  ----------------------------<  106<H>  5.4<H>   |  23  |  2.08<H>    Ca    8.1<L>      04 Sep 2021 08:03  Phos  5.0     09-04  Mg     1.7     09-03    TPro  6.1  /  Alb  2.5<L>  /  TBili  0.8  /  DBili  x   /  AST  32  /  ALT  76<H>  /  AlkPhos  147<H>  09-03    CARDIAC MARKERS ( 03 Sep 2021 12:07 )  .000 ng/mL / x     / 46 U/L / x     / x              Telemetry: AF

## 2021-09-04 NOTE — PROGRESS NOTE ADULT - ASSESSMENT
80 year old male with a history of HTN, HL, COPD, hemochromatosis who presents with syncope and rapid atrial fibrillation.    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

## 2021-09-04 NOTE — PROGRESS NOTE ADULT - ASSESSMENT
The patient is an 80 year old male with a history of HTN, HL, COPD, hemochromatosis who presents with syncope and rapid atrial fibrillation.    Plan:  - Echo with normal LV systolic function, mild pulm HTN  - BP and HR improved  - Continue metoprolol tartrate 25 mg bid  - If HR trends up, resume diltiazem  - Hold lisinopril  - Hold furosemide  - D-dimer significantly elevated - possible PE although cannot get CTA chest due to ROBERT  - Renal function improving with IV fluids  - Given possible VTE and ROBERT (although improving) with new AF, will start apixaban at 5 mg bid and forego 10 mg bid dosing for now

## 2021-09-04 NOTE — PROGRESS NOTE ADULT - SUBJECTIVE AND OBJECTIVE BOX
Subjective: Patient seen and examined. No overnight events. HR improved.     MEDICATIONS  (STANDING):  apixaban 5 milliGRAM(s) Oral two times a day  atorvastatin 10 milliGRAM(s) Oral at bedtime  budesonide 160 MICROgram(s)/formoterol 4.5 MICROgram(s) Inhaler 2 Puff(s) Inhalation two times a day  influenza   Vaccine 0.5 milliLiter(s) IntraMuscular once  metoprolol tartrate 25 milliGRAM(s) Oral two times a day  nystatin Powder 1 Application(s) Topical two times a day  sodium chloride 0.9%. 1000 milliLiter(s) (50 mL/Hr) IV Continuous <Continuous>  tiotropium 18 MICROgram(s) Capsule 1 Capsule(s) Inhalation daily    MEDICATIONS  (PRN):  acetaminophen   Tablet .. 650 milliGRAM(s) Oral every 6 hours PRN Temp greater or equal to 38C (100.4F), Mild Pain (1 - 3)      Allergies    No Known Allergies    Intolerances        Vital Signs Last 24 Hrs  T(C): 36.2 (04 Sep 2021 09:45), Max: 37.1 (03 Sep 2021 11:30)  T(F): 97.2 (04 Sep 2021 09:45), Max: 98.7 (03 Sep 2021 11:30)  HR: 64 (04 Sep 2021 09:45) (60 - 114)  BP: 123/77 (04 Sep 2021 09:45) (105/69 - 145/94)  BP(mean): --  RR: 18 (04 Sep 2021 09:45) (16 - 22)  SpO2: 97% (04 Sep 2021 09:45) (93% - 100%)    PHYSICAL EXAM:  GENERAL: NAD, well-groomed, well-developed  HEAD:  Atraumatic, Normocephalic  ENMT: Moist mucous membranes,   NECK: Supple, No JVD, Normal thyroid  NERVOUS SYSTEM:  All 4 extremities mobile, no gross sensory deficits.   CHEST/LUNG: Clear to auscultation bilaterally; No rales, rhonchi, wheezing, or rubs  HEART: Regular rate and rhythm; No murmurs, rubs, or gallops  ABDOMEN: Soft, Nontender, Nondistended; Bowel sounds present  EXTREMITIES:  2+ Peripheral Pulses, No clubbing, cyanosis, or edema      LABS:                        11.8   11.74 )-----------( 143      ( 04 Sep 2021 08:03 )             36.2     04 Sep 2021 08:03    146    |  115    |  65     ----------------------------<  106    5.4     |  23     |  2.08     Ca    8.1        04 Sep 2021 08:03  Phos  5.0       04 Sep 2021 08:03  Mg     1.7       03 Sep 2021 12:07    TPro  6.1    /  Alb  2.5    /  TBili  0.8    /  DBili  x      /  AST  32     /  ALT  76     /  AlkPhos  147    03 Sep 2021 12:07      Urinalysis Basic - ( 03 Sep 2021 12:07 )    Color: Yellow / Appearance: Clear / S.015 / pH: x  Gluc: x / Ketone: Negative  / Bili: Negative / Urobili: Negative mg/dL   Blood: x / Protein: 30 mg/dL / Nitrite: Negative   Leuk Esterase: Small / RBC: >50 /HPF / WBC 0-2   Sq Epi: x / Non Sq Epi: Few / Bacteria: Few      CAPILLARY BLOOD GLUCOSE          RADIOLOGY & ADDITIONAL TESTS:    Imaging Personally Reviewed:  [ ] YES     Consultant(s) Notes Reviewed:      Care Discussed with Consultants/Other Providers:    Advanced Directives: [ ] DNR  [ ] No feeding tube  [ ] MOLST in chart  [ ] MOLST completed today  [ ] Unknown

## 2021-09-04 NOTE — PROGRESS NOTE ADULT - ASSESSMENT
80M HTN, HLD, COPD, Hemachromatosis admitted for Syncope and New onset Atrial Fibrillation with RVR.     Syncope / Atrial Fibrillation RVR - New Onset. BP soft and started on Digoxin. Continue Metoprolol and hold Cardizem. Given therapeutic Lovenox and started Eliquis for AC.  Follow up TSH. Echo reviewed.  Cardiology consult noted.     Elevated D. Dimer - CT-A contraindicated due to renal failure. V/Q not reliable due to COPD. Venous Dopplers negative for DVT.  Elevation possible from recent infection or renal failure. On Eliquis.    Acute Renal Failure - Most likely from dehydration and diuretic use. Baseline CR= 1.18. Give IV Fluids and monitor Lytes. Dose reduction on medications.     HTN / HLD - Hold ACE-I, Cardizem and Diuretics due to renal failure. Continue, Metoprolol and Statin.     COPD - Advair, Albuterol and Spiriva     Diet - Regular    DVT Prophylaxis - Eliquis    Disposition - Discharge planning pending hospital course     Recent Admission in [facility] 8/11/21 thru 8/23/21.  Had Arthrocentesis 8/10/21 and found to have MSU and CCP crystals. Treated with Steroids.  Also found to have RLE Cellulitis and treated with Abx. Discharged to rehab 8/23.      80M HTN, HLD, COPD, Hemachromatosis admitted for Syncope and New onset Atrial Fibrillation with RVR.     Syncope / Atrial Fibrillation RVR - New Onset. BP soft and started on Digoxin. Continue Metoprolol and hold Cardizem. Given therapeutic Lovenox and started Eliquis for AC.  Follow up TSH. Echo reviewed.  Cardiology consult noted.     Elevated D. Dimer - CT-A contraindicated due to renal failure. V/Q not reliable due to COPD. Venous Dopplers negative for DVT.  Elevation possible from recent infection or renal failure. On Eliquis.    Acute Renal Failure - Acute Urinary Retention.  Insert [place]. Baseline CR= 1.18. Change IVF to D5 due to hypernatremia and monitor Lytes. Dose reduction on medications.     HTN / HLD - Hold ACE-I, Cardizem and Diuretics due to renal failure. Continue, Metoprolol and Statin.     COPD - Advair, Albuterol and Spiriva     Diet - Regular    DVT Prophylaxis - Eliquis    Disposition - Discharge planning pending hospital course     Recent Admission in [facility] 8/11/21 thru 8/23/21.  Had Arthrocentesis 8/10/21 and found to have MSU and CCP crystals. Treated with Steroids.  Also found to have RLE Cellulitis and treated with Abx. Discharged to rehab 8/23.

## 2021-09-05 DIAGNOSIS — E78.5 HYPERLIPIDEMIA, UNSPECIFIED: ICD-10-CM

## 2021-09-05 DIAGNOSIS — N17.9 ACUTE KIDNEY FAILURE, UNSPECIFIED: ICD-10-CM

## 2021-09-05 DIAGNOSIS — Z29.9 ENCOUNTER FOR PROPHYLACTIC MEASURES, UNSPECIFIED: ICD-10-CM

## 2021-09-05 DIAGNOSIS — I48.91 UNSPECIFIED ATRIAL FIBRILLATION: ICD-10-CM

## 2021-09-05 DIAGNOSIS — J44.9 CHRONIC OBSTRUCTIVE PULMONARY DISEASE, UNSPECIFIED: ICD-10-CM

## 2021-09-05 DIAGNOSIS — I10 ESSENTIAL (PRIMARY) HYPERTENSION: ICD-10-CM

## 2021-09-05 LAB
ALBUMIN SERPL ELPH-MCNC: 1.9 G/DL — LOW (ref 3.3–5)
ALP SERPL-CCNC: 113 U/L — SIGNIFICANT CHANGE UP (ref 30–120)
ALT FLD-CCNC: 36 U/L DA — SIGNIFICANT CHANGE UP (ref 10–60)
ANION GAP SERPL CALC-SCNC: 10 MMOL/L — SIGNIFICANT CHANGE UP (ref 5–17)
AST SERPL-CCNC: 15 U/L — SIGNIFICANT CHANGE UP (ref 10–40)
BILIRUB SERPL-MCNC: 0.8 MG/DL — SIGNIFICANT CHANGE UP (ref 0.2–1.2)
BUN SERPL-MCNC: 45 MG/DL — HIGH (ref 7–23)
CALCIUM SERPL-MCNC: 7.9 MG/DL — LOW (ref 8.4–10.5)
CHLORIDE SERPL-SCNC: 113 MMOL/L — HIGH (ref 96–108)
CO2 SERPL-SCNC: 20 MMOL/L — LOW (ref 22–31)
CREAT SERPL-MCNC: 1.38 MG/DL — HIGH (ref 0.5–1.3)
CULTURE RESULTS: NO GROWTH — SIGNIFICANT CHANGE UP
CULTURE RESULTS: SIGNIFICANT CHANGE UP
GLUCOSE SERPL-MCNC: 94 MG/DL — SIGNIFICANT CHANGE UP (ref 70–99)
HCT VFR BLD CALC: 35.6 % — LOW (ref 39–50)
HGB BLD-MCNC: 11.8 G/DL — LOW (ref 13–17)
MCHC RBC-ENTMCNC: 33.1 GM/DL — SIGNIFICANT CHANGE UP (ref 32–36)
MCHC RBC-ENTMCNC: 33.3 PG — SIGNIFICANT CHANGE UP (ref 27–34)
MCV RBC AUTO: 100.6 FL — HIGH (ref 80–100)
NRBC # BLD: 0 /100 WBCS — SIGNIFICANT CHANGE UP (ref 0–0)
PLATELET # BLD AUTO: 143 K/UL — LOW (ref 150–400)
POTASSIUM SERPL-MCNC: 4.1 MMOL/L — SIGNIFICANT CHANGE UP (ref 3.5–5.3)
POTASSIUM SERPL-SCNC: 4.1 MMOL/L — SIGNIFICANT CHANGE UP (ref 3.5–5.3)
PROT SERPL-MCNC: 5.1 G/DL — LOW (ref 6–8.3)
RBC # BLD: 3.54 M/UL — LOW (ref 4.2–5.8)
RBC # FLD: 13.5 % — SIGNIFICANT CHANGE UP (ref 10.3–14.5)
SODIUM SERPL-SCNC: 143 MMOL/L — SIGNIFICANT CHANGE UP (ref 135–145)
SPECIMEN SOURCE: SIGNIFICANT CHANGE UP
SPECIMEN SOURCE: SIGNIFICANT CHANGE UP
TSH SERPL-MCNC: 2.33 UIU/ML — SIGNIFICANT CHANGE UP (ref 0.27–4.2)
WBC # BLD: 8.97 K/UL — SIGNIFICANT CHANGE UP (ref 3.8–10.5)
WBC # FLD AUTO: 8.97 K/UL — SIGNIFICANT CHANGE UP (ref 3.8–10.5)

## 2021-09-05 PROCEDURE — 99233 SBSQ HOSP IP/OBS HIGH 50: CPT

## 2021-09-05 RX ADMIN — NYSTATIN CREAM 1 APPLICATION(S): 100000 CREAM TOPICAL at 05:32

## 2021-09-05 RX ADMIN — Medication 25 MILLIGRAM(S): at 05:31

## 2021-09-05 RX ADMIN — ATORVASTATIN CALCIUM 10 MILLIGRAM(S): 80 TABLET, FILM COATED ORAL at 22:06

## 2021-09-05 RX ADMIN — TIOTROPIUM BROMIDE 1 CAPSULE(S): 18 CAPSULE ORAL; RESPIRATORY (INHALATION) at 06:31

## 2021-09-05 RX ADMIN — BUDESONIDE AND FORMOTEROL FUMARATE DIHYDRATE 2 PUFF(S): 160; 4.5 AEROSOL RESPIRATORY (INHALATION) at 18:07

## 2021-09-05 RX ADMIN — Medication 25 MILLIGRAM(S): at 18:07

## 2021-09-05 RX ADMIN — APIXABAN 5 MILLIGRAM(S): 2.5 TABLET, FILM COATED ORAL at 18:07

## 2021-09-05 RX ADMIN — SODIUM CHLORIDE 100 MILLILITER(S): 9 INJECTION, SOLUTION INTRAVENOUS at 06:31

## 2021-09-05 RX ADMIN — BUDESONIDE AND FORMOTEROL FUMARATE DIHYDRATE 2 PUFF(S): 160; 4.5 AEROSOL RESPIRATORY (INHALATION) at 06:22

## 2021-09-05 RX ADMIN — APIXABAN 5 MILLIGRAM(S): 2.5 TABLET, FILM COATED ORAL at 05:31

## 2021-09-05 RX ADMIN — NYSTATIN CREAM 1 APPLICATION(S): 100000 CREAM TOPICAL at 18:08

## 2021-09-05 NOTE — DIETITIAN INITIAL EVALUATION ADULT. - PERTINENT LABORATORY DATA
( 9/4/21)   146(H)   115( H)   106                  5.4( H)    23        65( H) /2.08  ca 8.1 phos 5.0( H)  CRP 43( H) alb 2.5( L) prealbumin 16( L) mg 1.7

## 2021-09-05 NOTE — PROGRESS NOTE ADULT - SUBJECTIVE AND OBJECTIVE BOX
Chief Complaint: Syncope    Interval Events: No events overnight.    Review of Systems:  General: No fevers, chills, weight loss or gain  Skin: No rashes, color changes  Cardiovascular: No chest pain, orthopnea  Respiratory: No shortness of breath, cough  Gastrointestinal: No nausea, abdominal pain  Genitourinary: No incontinence, pain with urination  Musculoskeletal: No pain, swelling, decreased range of motion  Neurological: No headache, weakness  Psychiatric: No depression, anxiety  Endocrine: No weight loss or gain, increased thirst  All other systems are comprehensively negative.    Physical Exam:  Vital Signs Last 24 Hrs  T(C): 36.6 (05 Sep 2021 05:51), Max: 36.6 (04 Sep 2021 14:13)  T(F): 97.9 (05 Sep 2021 05:51), Max: 97.9 (04 Sep 2021 14:13)  HR: 81 (05 Sep 2021 05:51) (61 - 81)  BP: 125/85 (05 Sep 2021 05:51) (123/77 - 155/86)  BP(mean): --  RR: 18 (05 Sep 2021 05:51) (16 - 18)  SpO2: 99% (05 Sep 2021 05:51) (94% - 99%)  General: NAD  HEENT: MMM  Neck: No JVD, no carotid bruit  Lungs: CTAB  CV: RRR, nl S1/S2, no M/R/G  Abdomen: S/NT/ND, +BS  Extremities: No LE edema, no cyanosis  Neuro: AAOx3, non-focal  Skin: No rash    Labs:    09-05    143  |  113<H>  |  45<H>  ----------------------------<  94  4.1   |  20<L>  |  1.38<H>    Ca    7.9<L>      05 Sep 2021 07:32  Phos  5.0     09-04  Mg     1.7     09-03    TPro  5.1<L>  /  Alb  1.9<L>  /  TBili  0.8  /  DBili  x   /  AST  15  /  ALT  36  /  AlkPhos  113  09-05                        11.8   8.97  )-----------( 143      ( 05 Sep 2021 07:32 )             35.6         Telemetry: AF, PVCs

## 2021-09-05 NOTE — PROGRESS NOTE ADULT - ASSESSMENT
80 year old male with a history of HTN, HL, COPD, hemochromatosis who presents with syncope and rapid atrial fibrillation.    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

## 2021-09-05 NOTE — PROGRESS NOTE ADULT - SUBJECTIVE AND OBJECTIVE BOX
[facility] NEPHROLOGY SERVICES, [facility]  NEPHROLOGY AND HYPERTENSION  [address]  [address]  [place] [address]  [phone]    MD ANTONIO MALDONADO, MD JOSHUA DIAS, MD LINA MATTSON, MD CHRISTOPHER PORTILLO, MD JOSE RAFAEL WANG, MD NEO LOPEZ MD          Patient events noted    MEDICATIONS  (STANDING):  apixaban 5 milliGRAM(s) Oral two times a day  atorvastatin 10 milliGRAM(s) Oral at bedtime  budesonide 160 MICROgram(s)/formoterol 4.5 MICROgram(s) Inhaler 2 Puff(s) Inhalation two times a day  dextrose 5% + sodium chloride 0.45%. 1000 milliLiter(s) (100 mL/Hr) IV Continuous <Continuous>  influenza   Vaccine 0.5 milliLiter(s) IntraMuscular once  metoprolol tartrate 25 milliGRAM(s) Oral two times a day  nystatin Powder 1 Application(s) Topical two times a day  tiotropium 18 MICROgram(s) Capsule 1 Capsule(s) Inhalation daily    MEDICATIONS  (PRN):  acetaminophen   Tablet .. 650 milliGRAM(s) Oral every 6 hours PRN Temp greater or equal to 38C (100.4F), Mild Pain (1 - 3)      09-04-21 @ 07:01  -  09-05-21 @ 07:00  --------------------------------------------------------  IN: 260 mL / OUT: 3975 mL / NET: -3715 mL    09-05-21 @ 07:01  -  09-05-21 @ 21:56  --------------------------------------------------------  IN: 1360 mL / OUT: 400 mL / NET: 960 mL      PHYSICAL EXAM:      T(C): 36.9 (09-05-21 @ 21:39), Max: 37 (09-05-21 @ 17:11)  HR: 89 (09-05-21 @ 21:39) (74 - 97)  BP: 110/87 (09-05-21 @ 21:39) (110/87 - 150/96)  RR: 18 (09-05-21 @ 21:39) (17 - 18)  SpO2: 99% (09-05-21 @ 21:39) (94% - 99%)  Wt(kg): --  Lungs clear  Heart S1S2  Abd soft NT ND  Extremities:   tr edema                                    11.8   8.97  )-----------( 143      ( 05 Sep 2021 07:32 )             35.6     09-05    143  |  113<H>  |  45<H>  ----------------------------<  94  4.1   |  20<L>  |  1.38<H>    Ca    7.9<L>      05 Sep 2021 07:32  Phos  5.0     09-04    TPro  5.1<L>  /  Alb  1.9<L>  /  TBili  0.8  /  DBili  x   /  AST  15  /  ALT  36  /  AlkPhos  113  09-05      LIVER FUNCTIONS - ( 05 Sep 2021 07:32 )  Alb: 1.9 g/dL / Pro: 5.1 g/dL / ALK PHOS: 113 U/L / ALT: 36 U/L DA / AST: 15 U/L / GGT: x           Creatinine Trend: 1.38<--, 2.08<--, 2.54<--    < from: US Renal (09.04.21 @ 13:15) >  IMPRESSION:    Interval resolution of right hydronephrosis. There remains mild left hydronephrosis, which is improved since the CT performed earlier today.    Nonobstructing 1.7 cm calculus in the lower pole of the left kidney.    The indeterminate left renal lesion identified on the CT exam is not visualized. Consider further evaluation with nonemergent contrast enhanced abdominal MRI utilizing renal mass protocol.    Prostamegaly.    Interval decompression of the urinary bladder.    Findings were discussed with Dr. Trinh 9/4/2021 1:26 PM by Dr. Friend.    --- End of Report ---    < end of copied text >    Assessment   ROBERT, pre / post renal azotemia  Nephrolithiasis   Improving     Plan:  Can d/c IVF  Urology evaluation    Micah Pederson MD [facility] NEPHROLOGY SERVICES, [facility]  NEPHROLOGY AND HYPERTENSION  [address]  [address]  [place] [address]  [phone]    MD ANTONIO MALDONADO, MD JOSHUA DIAS, MD LINA MATTSON, MD CHRISTOPHER PORTILLO, MD JOSE RAFAEL WANG, MD NEO LOPEZ MD          Patient events noted    MEDICATIONS  (STANDING):  apixaban 5 milliGRAM(s) Oral two times a day  atorvastatin 10 milliGRAM(s) Oral at bedtime  budesonide 160 MICROgram(s)/formoterol 4.5 MICROgram(s) Inhaler 2 Puff(s) Inhalation two times a day  dextrose 5% + sodium chloride 0.45%. 1000 milliLiter(s) (100 mL/Hr) IV Continuous <Continuous>  influenza   Vaccine 0.5 milliLiter(s) IntraMuscular once  metoprolol tartrate 25 milliGRAM(s) Oral two times a day  nystatin Powder 1 Application(s) Topical two times a day  tiotropium 18 MICROgram(s) Capsule 1 Capsule(s) Inhalation daily    MEDICATIONS  (PRN):  acetaminophen   Tablet .. 650 milliGRAM(s) Oral every 6 hours PRN Temp greater or equal to 38C (100.4F), Mild Pain (1 - 3)      09-04-21 @ 07:01  -  09-05-21 @ 07:00  --------------------------------------------------------  IN: 260 mL / OUT: 3975 mL / NET: -3715 mL    09-05-21 @ 07:01  -  09-05-21 @ 21:56  --------------------------------------------------------  IN: 1360 mL / OUT: 400 mL / NET: 960 mL      PHYSICAL EXAM:      T(C): 36.9 (09-05-21 @ 21:39), Max: 37 (09-05-21 @ 17:11)  HR: 89 (09-05-21 @ 21:39) (74 - 97)  BP: 110/87 (09-05-21 @ 21:39) (110/87 - 150/96)  RR: 18 (09-05-21 @ 21:39) (17 - 18)  SpO2: 99% (09-05-21 @ 21:39) (94% - 99%)  Wt(kg): --  Lungs clear  Heart S1S2  Abd soft NT ND  Extremities:   tr edema                                    11.8   8.97  )-----------( 143      ( 05 Sep 2021 07:32 )             35.6     09-05    143  |  113<H>  |  45<H>  ----------------------------<  94  4.1   |  20<L>  |  1.38<H>    Ca    7.9<L>      05 Sep 2021 07:32  Phos  5.0     09-04    TPro  5.1<L>  /  Alb  1.9<L>  /  TBili  0.8  /  DBili  x   /  AST  15  /  ALT  36  /  AlkPhos  113  09-05      LIVER FUNCTIONS - ( 05 Sep 2021 07:32 )  Alb: 1.9 g/dL / Pro: 5.1 g/dL / ALK PHOS: 113 U/L / ALT: 36 U/L DA / AST: 15 U/L / GGT: x           Creatinine Trend: 1.38<--, 2.08<--, 2.54<--    < from: US Renal (09.04.21 @ 13:15) >  IMPRESSION:    Interval resolution of right hydronephrosis. There remains mild left hydronephrosis, which is improved since the CT performed earlier today.    Nonobstructing 1.7 cm calculus in the lower pole of the left kidney.    The indeterminate left renal lesion identified on the CT exam is not visualized. Consider further evaluation with nonemergent contrast enhanced abdominal MRI utilizing renal mass protocol.    Prostamegaly.    Interval decompression of the urinary bladder.    Findings were discussed with Dr. Trinh 9/4/2021 1:26 PM by Dr. Friend.    --- End of Report ---    < end of copied text >    Assessment   ROBERT, pre / post renal azotemia  Nephrolithiasis   Improving     Plan:  Can d/c IVF tomorrow  Urology evaluation    Micah Pederson MD

## 2021-09-05 NOTE — DIETITIAN INITIAL EVALUATION ADULT. - OTHER INFO
80M HTN, HLD, COPD, Hemachromatosis admitted for Syncope and New onset Atrial Fibrillation with RVR. 80M HTN, HLD, COPD, Hemachromatosis admitted for Syncope and New onset Atrial Fibrillation with RVR.  skin: venous ulcer to lower R shin  unstageable to mid spine  stg I PI to sacrum  trace edema to ankles

## 2021-09-05 NOTE — PROGRESS NOTE ADULT - ASSESSMENT
80M HTN, HLD, COPD, Hemachromatosis who was in Rehab after being admitted in [facility] from 8/9 thru 8/23 for Crystal Arthropathy and RLE Cellulitis treated with Antibiotics and Steroids. Patient today was reported to have syncopized, where patient does not recall the details of, but does state that for the past 2 days has been not feeling well and increasing SOB.  No chest pain or palpitations. No Fevers or chills. No nausea or vomiting.      In the ED routine labs and imaging done.  Was found to be in Atrial Fibrillation with RVR, Acute Renal Failure, and elevated D. Dimer.  EKG revealed Atrial Fibrillation with 's. GIven IVF and Metoprolol initially.  Patient also hypoxic and placed on 3L NC. Then was started on Digoxin and admitted for further management.  (03 Sep 2021 13:57)

## 2021-09-05 NOTE — PROGRESS NOTE ADULT - PROBLEM SELECTOR PLAN 2
Patient with ROBERT, POA.   Possibly prerenal.   BUN/Creat improved with IVF.   Monitor serial BMP.   Nephrology follow up.

## 2021-09-05 NOTE — PROGRESS NOTE ADULT - PROBLEM SELECTOR PLAN 1
Patient with new onset atrial fibrillation with RVR, POA.   Continue metoprolol for rate control.   Continue Eliquis for anticoagulation.   Cardiology follow up.

## 2021-09-05 NOTE — DIETITIAN INITIAL EVALUATION ADULT. - PHYSCIAL ASSESSMENT
19 yo M with abdominal pain since this morning, was generalized then went to RLQ. Sent in my PMD to r/o appendicitis (PMD - Albert). No fever, no v/d. Nl BM this morning. No nausea. Slightly decreased appetite. No urinary symptoms. Exam - Gen - NAD, Head - NCAT, TMs - clear b/l, Pharynx - clear, MMM, Heart - RRR, no m/g/r, Lungs - CTAB, no w/c/r, Abdomen - soft, tender to RLQ, ND,  - Circumcised, nl descended testes b/l, Extremities - FROM, no edema, erythema, ecchymosis, Neuro - CN 2-12 intact, nl strength and sensation, nl gait. Plan - CT abd to r/o appendicitis, labs, urine, IV, contrast. CT confirmed early appendicitis. Admitted to surgery for OR, given cefotetan. Res presents with depletion of muscle mass, moderate in temporal region , mild in shoulder and clavicle region. Depletion of fat - moderate in tricep region and mild overlying the ribs c/e mod maln in context of chronic illness debilitated

## 2021-09-05 NOTE — DIETITIAN INITIAL EVALUATION ADULT. - ORAL INTAKE PTA/DIET HISTORY
Pt reports he follows no special diet, has lost 45-50# over the last year, initially intentional and then unintentional . He reports with wt loss, dentures have gotten looser so he cannot eat bagels. He is not a great historian in terms of providing a typical daily intake

## 2021-09-05 NOTE — PROGRESS NOTE ADULT - PROBLEM/PLAN-6
Counseling and Referral of Other Preventative  (Italic type indicates deductible and co-insurance are waived)    Patient Name: Arash Childress  Today's Date: 2/19/2019    Health Maintenance       Date Due Completion Date    Zoster Vaccine 08/30/2013 ---    Pneumococcal Vaccine (65+ Low/Medium Risk) (1 of 2 - PCV13) 08/30/2018 ---    Abdominal Aortic Aneurysm Screening 08/30/2018 ---    Lipid Panel 12/07/2023 12/7/2018    Colonoscopy 08/18/2024 8/18/2014    TETANUS VACCINE 09/18/2025 9/18/2015        Orders Placed This Encounter   Procedures    US Abdominal Aorta    Pneumococcal Conjugate Vaccine (13 Valent) (IM)     The following information is provided to all patients.  This information is to help you find resources for any of the problems found today that may be affecting your health:                Living healthy guide: www.[facility].[place].gov      Understanding Diabetes: www.diabetes.org      Eating healthy: www.cdc.gov/healthyweight      CDC home safety checklist: www.cdc.gov/steadi/patient.html      Agency on Aging: www.goea.[place].[facility]      Alcoholics anonymous (AA): www.aa.org      Physical Activity: www.lorraine.nih.gov/nj7yfyt      Tobacco use: www.quitwithusla.org      DISPLAY PLAN FREE TEXT

## 2021-09-05 NOTE — PHYSICAL THERAPY INITIAL EVALUATION ADULT - CRITERIA FOR SKILLED THERAPEUTIC INTERVENTIONS
Return to [facility]/impairments found/anticipated equipment needs at discharge/anticipated discharge recommendation

## 2021-09-05 NOTE — PROGRESS NOTE ADULT - ASSESSMENT
The patient is an 80 year old male with a history of HTN, HL, COPD, hemochromatosis who presents with syncope and rapid atrial fibrillation.    Plan:  - Echo with normal LV systolic function, mild pulm HTN  - BP and HR improved  - Continue metoprolol tartrate 25 mg bid  - If HR trends up, resume diltiazem  - Hold lisinopril  - Hold furosemide - resume when ok from a renal standpoint  - D-dimer significantly elevated - possible PE although cannot get CTA chest due to ROBERT  - Renal function improving with IV fluids  - Given possible VTE and ROBERT (although improving) with new AF, continue apixaban at 5 mg bid and forego 10 mg bid dosing for now

## 2021-09-05 NOTE — PROGRESS NOTE ADULT - SUBJECTIVE AND OBJECTIVE BOX
Date/Time Patient Seen:  		  Referring MD:   Data Reviewed	       Patient is a 80y old  Male who presents with a chief complaint of Syncope and SOB (04 Sep 2021 11:29)      Subjective/HPI     PAST MEDICAL & SURGICAL HISTORY:  No pertinent past medical history    Gout    Cellulitis  Right lower leg    COPD exacerbation    HTN (hypertension)    H/O ulcerative colitis    Hyperlipidemia    History of hemochromatosis    History of hemochromatosis          Medication list         MEDICATIONS  (STANDING):  apixaban 5 milliGRAM(s) Oral two times a day  atorvastatin 10 milliGRAM(s) Oral at bedtime  budesonide 160 MICROgram(s)/formoterol 4.5 MICROgram(s) Inhaler 2 Puff(s) Inhalation two times a day  dextrose 5% + sodium chloride 0.45%. 1000 milliLiter(s) (100 mL/Hr) IV Continuous <Continuous>  influenza   Vaccine 0.5 milliLiter(s) IntraMuscular once  metoprolol tartrate 25 milliGRAM(s) Oral two times a day  nystatin Powder 1 Application(s) Topical two times a day  tiotropium 18 MICROgram(s) Capsule 1 Capsule(s) Inhalation daily    MEDICATIONS  (PRN):  acetaminophen   Tablet .. 650 milliGRAM(s) Oral every 6 hours PRN Temp greater or equal to 38C (100.4F), Mild Pain (1 - 3)         Vitals log        ICU Vital Signs Last 24 Hrs  T(C): 36.6 (05 Sep 2021 05:51), Max: 36.6 (04 Sep 2021 14:13)  T(F): 97.9 (05 Sep 2021 05:51), Max: 97.9 (04 Sep 2021 14:13)  HR: 81 (05 Sep 2021 05:51) (61 - 81)  BP: 125/85 (05 Sep 2021 05:51) (123/77 - 155/86)  BP(mean): --  ABP: --  ABP(mean): --  RR: 18 (05 Sep 2021 05:51) (16 - 18)  SpO2: 99% (05 Sep 2021 05:51) (94% - 99%)           Input and Output:  I&O's Detail    03 Sep 2021 07:01  -  04 Sep 2021 07:00  --------------------------------------------------------  IN:    sodium chloride 0.9%: 800 mL    Sodium Chloride 0.9% Bolus: 1000 mL    Sodium Chloride 0.9% Bolus: 500 mL  Total IN: 2300 mL    OUT:    Voided (mL): 750 mL  Total OUT: 750 mL    Total NET: 1550 mL      04 Sep 2021 07:01  -  05 Sep 2021 06:47  --------------------------------------------------------  IN:    Oral Fluid: 260 mL  Total IN: 260 mL    OUT:    Indwelling Catheter - Urethral (mL): 3175 mL  Total OUT: 3175 mL    Total NET: -2915 mL          Lab Data                        11.8   11.74 )-----------( 143      ( 04 Sep 2021 08:03 )             36.2     09-04    146<H>  |  115<H>  |  65<H>  ----------------------------<  106<H>  5.4<H>   |  23  |  2.08<H>    Ca    8.1<L>      04 Sep 2021 08:03  Phos  5.0     09-04  Mg     1.7     09-03    TPro  6.1  /  Alb  2.5<L>  /  TBili  0.8  /  DBili  x   /  AST  32  /  ALT  76<H>  /  AlkPhos  147<H>  09-03      CARDIAC MARKERS ( 03 Sep 2021 12:07 )  .000 ng/mL / x     / 46 U/L / x     / x            Review of Systems	      Objective     Physical Examination    heart s1s2  lung dec BS  abd soft      Pertinent Lab findings & Imaging      Trae:  NO   Adequate UO     I&O's Detail    03 Sep 2021 07:01  -  04 Sep 2021 07:00  --------------------------------------------------------  IN:    sodium chloride 0.9%: 800 mL    Sodium Chloride 0.9% Bolus: 1000 mL    Sodium Chloride 0.9% Bolus: 500 mL  Total IN: 2300 mL    OUT:    Voided (mL): 750 mL  Total OUT: 750 mL    Total NET: 1550 mL      04 Sep 2021 07:01  -  05 Sep 2021 06:47  --------------------------------------------------------  IN:    Oral Fluid: 260 mL  Total IN: 260 mL    OUT:    Indwelling Catheter - Urethral (mL): 3175 mL  Total OUT: 3175 mL    Total NET: -2915 mL               Discussed with:     Cultures:	        Radiology

## 2021-09-05 NOTE — PHYSICAL THERAPY INITIAL EVALUATION ADULT - PERTINENT HX OF CURRENT PROBLEM, REHAB EVAL
was in Rehab after being admitted in [facility] from 8/9 thru 8/23 for Crystal Arthropathy and RLE Cellulitis treated with Antibiotics and Steroids. Patient today was reported to have syncopized, where patient does not recall the details of, but does state that for the past 2 days has been not feeling well and increasing SOB.

## 2021-09-05 NOTE — PROGRESS NOTE ADULT - SUBJECTIVE AND OBJECTIVE BOX
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 Patient today was reported to have syncopized, where patient does not recall the details of, but does state that for the past 2 days has been not feeling well and increasing SOB.  No chest pain or palpitations. No Fevers or chills. No nausea or vomiting.      In the ED routine labs and imaging done.  Was found to be in Atrial Fibrillation with RVR, Acute Renal Failure, and elevated D. Dimer.  EKG revealed Atrial Fibrillation with 's. GIven IVF and Metoprolol initially.  Patient also hypoxic and placed on 3L NC. Then was started on Digoxin and admitted for further management.  (03 Sep 2021 13:57)    INTERVAL HPI/OVERNIGHT EVENTS:  Chart reviewed, notes reviewed.   Patient seen and examined.  Being followed by following specialists: cardiology, pulmonary, nephrology.     Consultant(s) Notes Reviewed:  [X] Yes    Care Discussed with Consultants/Other Providers: [X] Yes    2021 --> Doing better. Denies any chest pain or pressure. No nausea or vomiting. No fever or chills. No dizziness or syncope.     REVIEW OF SYSTEMS:  CONSTITUTIONAL: + fatigue  EYES: No eye pain, or discharge  ENMT: No sinus or throat pain  NECK: No pain or stiffness  BREASTS: No pain, masses, or nipple discharge  RESPIRATORY:  No shortness of breath  CARDIOVASCULAR: No chest pain, palpitations, dizziness,   GASTROINTESTINAL: No abdominal or epigastric pain. No nausea, vomiting.  GENITOURINARY: No dysuria, frequency, hematuria, or incontinence  NEUROLOGICAL: No loss of strength, numbness, or tremors  SKIN: No itching, burning, rashes, or lesions   LYMPH NODES: No enlarged glands  ENDOCRINE: No polydipsia or polyuria  MUSCULOSKELETAL: No muscle, back, or extremity pain  PSYCHIATRIC: No depression, anxiety, mood swings.  HEME/LYMPH: No easy bruising, or bleeding gums  ALLERGY AND IMMUNOLOGIC: No hives or eczema    Allergies    No Known Allergies    Intolerances      Home Medications:  Advair Diskus 250 mcg-50 mcg inhalation powder: 1 puff(s) inhaled 2 times a day (03 Sep 2021 13:31)  atorvastatin 10 mg oral tablet: 1 tab(s) orally once a day (03 Sep 2021 13:31)  budesonide 9 mg oral tablet, extended release: 1 tab(s) orally once a day (in the morning) (03 Sep 2021 13:31)  Cardizem  mg/24 hours oral capsule, extended release: 1 cap(s) orally once a day (03 Sep 2021 13:31)  Cipro 500 mg oral tablet: 1 tab(s) orally every 12 hours (03 Sep 2021 13:31)  folic acid 1 mg oral tablet: 1 tab(s) orally once a day (03 Sep 2021 13:31)  K-Dur 20 oral tablet, extended release: 1 tab(s) orally 2 times a day (03 Sep 2021 13:31)  lactobacillus acidophilus oral tablet:  (03 Sep 2021 13:31)  Lasix 40 mg oral tablet: 1 tab(s) orally once a day (03 Sep 2021 13:31)  lisinopril 10 mg oral tablet: 1 tab(s) orally once a day (03 Sep 2021 13:31)  magnesium oxide 400 mg oral tablet: 1 tab(s) orally once a day (03 Sep 2021 13:31)  Metoprolol Tartrate 25 mg oral tablet: 1 tab(s) orally 2 times a day (03 Sep 2021 13:31)  metroNIDAZOLE 500 mg oral tablet: 1 tab(s) orally 3 times a day (03 Sep 2021 13:31)  Spiriva HandiHaler 18 mcg inhalation capsule: 1 cap(s) inhaled once a day (03 Sep 2021 13:31)  Tubersol 5 tuberculin units/0.1 mL intradermal solution:  (03 Sep 2021 13:31)    MEDICATIONS  (STANDING):  apixaban 5 milliGRAM(s) Oral two times a day  atorvastatin 10 milliGRAM(s) Oral at bedtime  budesonide 160 MICROgram(s)/formoterol 4.5 MICROgram(s) Inhaler 2 Puff(s) Inhalation two times a day  dextrose 5% + sodium chloride 0.45%. 1000 milliLiter(s) (100 mL/Hr) IV Continuous <Continuous>  influenza   Vaccine 0.5 milliLiter(s) IntraMuscular once  metoprolol tartrate 25 milliGRAM(s) Oral two times a day  nystatin Powder 1 Application(s) Topical two times a day  tiotropium 18 MICROgram(s) Capsule 1 Capsule(s) Inhalation daily    MEDICATIONS  (PRN):  acetaminophen   Tablet .. 650 milliGRAM(s) Oral every 6 hours PRN Temp greater or equal to 38C (100.4F), Mild Pain (1 - 3)    Vital Signs Last 24 Hrs  T(C): 36.3 (05 Sep 2021 14:02), Max: 36.6 (04 Sep 2021 21:41)  T(F): 97.4 (05 Sep 2021 14:02), Max: 97.9 (04 Sep 2021 21:41)  HR: 95 (05 Sep 2021 14:03) (74 - 97)  BP: 133/74 (05 Sep 2021 14:02) (111/69 - 155/86)  BP(mean): --  RR: 18 (05 Sep 2021 14:02) (16 - 18)  SpO2: 98% (05 Sep 2021 14:02) (94% - 99%)    PHYSICAL EXAM:  GENERAL: NAD, well-groomed, well-developed  HEAD:  Atraumatic, Normocephalic  EYES: Pallor +  ENMT: Moist mucous membranes, no lesions  NECK: Supple.  CHEST/LUNG: Decreased breath sounds at bases, rest is clear.   HEART: S1, S2.   ABDOMEN: Soft, Nontender, Nondistended; Bowel sounds present  EXTREMITIES:  2+ Peripheral Pulses, No clubbing, cyanosis, or edema  MS: No joint swelling or deformity.   LYMPH: No lymphadenopathy noted  SKIN: No rashes or lesions  NERVOUS SYSTEM:  No focal deficit.   PSYCH:  Awake and alert.   LABS:                         11.8   8.97  )-----------( 143      ( 05 Sep 2021 07:32 )             35.6     05 Sep 2021 07:32    143    |  113    |  45     ----------------------------<  94     4.1     |  20     |  1.38     Ca    7.9<L>      05 Sep 2021 07:32  Phos  5.0     09-04    TPro  5.1<L>  /  Alb  1.9<L>  /  TBili  0.8  /  DBili  x   /  AST  15  /  ALT  36  /  AlkPhos  113  09-05    Sodium, Serum: 143 mmol/L ( @ 07:32)  Sodium, Serum: 146 mmol/L ( @ 08:03)    Potassium, Serum: 4.1 mmol/L ( @ 07:32)  Potassium, Serum: 5.4 mmol/L ( @ 08:03)    Hemoglobin: 11.8 g/dL ( @ 07:32)  Hemoglobin: 11.8 g/dL ( @ 08:03)  Hemoglobin: 13.7 g/dL ( @ 12:07)    Creatinine, Serum 1.38 ( @ 07:32)  Creatinine, Serum 2.08 ( @ 08:03)  Creatinine, Serum 2.54 ( @ 12:07)    Thyroid Stimulating Hormone, Serum: 2.23 uIU/mL ( @ 12:07)    Creatine Kinase, Serum: 46 U/L ( @ 12:07)    Troponin I, Serum: .000 ng/mL ( @ 12:07)    Culture Results:   >=3 organisms. Probable collection contamination. ( @ 16:20)    Urinalysis Basic - ( 04 Sep 2021 12:29 )    Color: Brown / Appearance: Turbid / S.015 / pH: x  Gluc: x / Ketone: Trace  / Bili: Small / Urobili: 1 mg/dL   Blood: x / Protein: 100 mg/dL / Nitrite: Positive   Leuk Esterase: Moderate / RBC: >50 /HPF / WBC 3-5   Sq Epi: x / Non Sq Epi: Many / Bacteria: Moderate    RADIOLOGY TEST: (IMAGES REVIEWED BY ME)  < from: US Renal (21 @ 13:15) >  EXAM:  US KIDNEY(S)                        PROCEDURE DATE:  2021    INTERPRETATION:  CLINICAL INFORMATION: ROBERT    COMPARISON: CT chest, abdomen, pelvis 2021    TECHNIQUE: Sonography of the kidneys and bladder.    FINDINGS:    Right kidney: 10.6 cm. No hydronephrosis, which has resolved since the CT exam performed earlier today. No renal mass. The punctate calculus in the lower pole of the right kidney identified on the concurrent CT exam is not identified.    Left kidney: 11.5 cm. Mild hydronephrosis, which is improved since the CT performed earlier today. Large shadowing nonobstructing calculus measuring 1.7 cm in the lower pole. The questioned indeterminate renal lesion visualized on the CT exam is not identified.    Urinary bladder: Underdistended and suboptimally assessed.    Prostate: Enlarged.    IMPRESSION:    Interval resolution of right hydronephrosis. There remains mild left hydronephrosis, which is improved since the CT performed earlier today.    Nonobstructing 1.7 cm calculus in the lower pole of the left kidney.    The indeterminate left renal lesion identified on the CT exam is not visualized. Consider further evaluation with nonemergent contrast enhanced abdominal MRI utilizing renal mass protocol.    Prostamegaly.    Interval decompression of the urinary bladder.    Findings were discussed with Dr. Trinh 2021 1:26 PM by Dr. Friend.    --- End of Report ---      < end of copied text >    Imaging Personally Reviewed:  [X] YES      HEALTH ISSUES - PROBLEM Dx:

## 2021-09-06 LAB
ANION GAP SERPL CALC-SCNC: 6 MMOL/L — SIGNIFICANT CHANGE UP (ref 5–17)
BASOPHILS # BLD AUTO: 0.02 K/UL — SIGNIFICANT CHANGE UP (ref 0–0.2)
BASOPHILS NFR BLD AUTO: 0.3 % — SIGNIFICANT CHANGE UP (ref 0–2)
BUN SERPL-MCNC: 34 MG/DL — HIGH (ref 7–23)
CALCIUM SERPL-MCNC: 7.8 MG/DL — LOW (ref 8.4–10.5)
CHLORIDE SERPL-SCNC: 111 MMOL/L — HIGH (ref 96–108)
CO2 SERPL-SCNC: 25 MMOL/L — SIGNIFICANT CHANGE UP (ref 22–31)
CREAT SERPL-MCNC: 1.19 MG/DL — SIGNIFICANT CHANGE UP (ref 0.5–1.3)
EOSINOPHIL # BLD AUTO: 0.26 K/UL — SIGNIFICANT CHANGE UP (ref 0–0.5)
EOSINOPHIL NFR BLD AUTO: 3.4 % — SIGNIFICANT CHANGE UP (ref 0–6)
GLUCOSE SERPL-MCNC: 112 MG/DL — HIGH (ref 70–99)
HCT VFR BLD CALC: 33.4 % — LOW (ref 39–50)
HGB BLD-MCNC: 11.1 G/DL — LOW (ref 13–17)
IMM GRANULOCYTES NFR BLD AUTO: 1.2 % — SIGNIFICANT CHANGE UP (ref 0–1.5)
LYMPHOCYTES # BLD AUTO: 0.86 K/UL — LOW (ref 1–3.3)
LYMPHOCYTES # BLD AUTO: 11.2 % — LOW (ref 13–44)
MCHC RBC-ENTMCNC: 33.2 GM/DL — SIGNIFICANT CHANGE UP (ref 32–36)
MCHC RBC-ENTMCNC: 33.3 PG — SIGNIFICANT CHANGE UP (ref 27–34)
MCV RBC AUTO: 100.3 FL — HIGH (ref 80–100)
MONOCYTES # BLD AUTO: 0.51 K/UL — SIGNIFICANT CHANGE UP (ref 0–0.9)
MONOCYTES NFR BLD AUTO: 6.7 % — SIGNIFICANT CHANGE UP (ref 2–14)
NEUTROPHILS # BLD AUTO: 5.92 K/UL — SIGNIFICANT CHANGE UP (ref 1.8–7.4)
NEUTROPHILS NFR BLD AUTO: 77.2 % — HIGH (ref 43–77)
NRBC # BLD: 0 /100 WBCS — SIGNIFICANT CHANGE UP (ref 0–0)
PLATELET # BLD AUTO: 156 K/UL — SIGNIFICANT CHANGE UP (ref 150–400)
POTASSIUM SERPL-MCNC: 3.9 MMOL/L — SIGNIFICANT CHANGE UP (ref 3.5–5.3)
POTASSIUM SERPL-SCNC: 3.9 MMOL/L — SIGNIFICANT CHANGE UP (ref 3.5–5.3)
RBC # BLD: 3.33 M/UL — LOW (ref 4.2–5.8)
RBC # FLD: 13.3 % — SIGNIFICANT CHANGE UP (ref 10.3–14.5)
SODIUM SERPL-SCNC: 142 MMOL/L — SIGNIFICANT CHANGE UP (ref 135–145)
WBC # BLD: 7.66 K/UL — SIGNIFICANT CHANGE UP (ref 3.8–10.5)
WBC # FLD AUTO: 7.66 K/UL — SIGNIFICANT CHANGE UP (ref 3.8–10.5)

## 2021-09-06 PROCEDURE — 99233 SBSQ HOSP IP/OBS HIGH 50: CPT

## 2021-09-06 RX ADMIN — TIOTROPIUM BROMIDE 1 CAPSULE(S): 18 CAPSULE ORAL; RESPIRATORY (INHALATION) at 06:01

## 2021-09-06 RX ADMIN — Medication 25 MILLIGRAM(S): at 17:48

## 2021-09-06 RX ADMIN — Medication 25 MILLIGRAM(S): at 06:01

## 2021-09-06 RX ADMIN — APIXABAN 5 MILLIGRAM(S): 2.5 TABLET, FILM COATED ORAL at 06:01

## 2021-09-06 RX ADMIN — NYSTATIN CREAM 1 APPLICATION(S): 100000 CREAM TOPICAL at 06:01

## 2021-09-06 RX ADMIN — NYSTATIN CREAM 1 APPLICATION(S): 100000 CREAM TOPICAL at 17:48

## 2021-09-06 RX ADMIN — BUDESONIDE AND FORMOTEROL FUMARATE DIHYDRATE 2 PUFF(S): 160; 4.5 AEROSOL RESPIRATORY (INHALATION) at 06:01

## 2021-09-06 RX ADMIN — ATORVASTATIN CALCIUM 10 MILLIGRAM(S): 80 TABLET, FILM COATED ORAL at 21:29

## 2021-09-06 RX ADMIN — APIXABAN 5 MILLIGRAM(S): 2.5 TABLET, FILM COATED ORAL at 17:48

## 2021-09-06 NOTE — PROGRESS NOTE ADULT - ASSESSMENT
80M HTN, HLD, COPD, Hemachromatosis who was in Rehab after being admitted in [facility] from 8/9 thru 8/23 for Crystal Arthropathy and RLE Cellulitis treated with Antibiotics and Steroids. Patient today was reported to have syncopized, where patient does not recall the details of, but does state that for the past 2 days has been not feeling well and increasing SOB.  No chest pain or palpitations. No Fevers or chills. No nausea or vomiting.      In the ED routine labs and imaging done.  Was found to be in Atrial Fibrillation with RVR, Acute Renal Failure, and elevated D. Dimer.  EKG revealed Atrial Fibrillation with 's. Given IVF and Metoprolol initially.  Patient also hypoxic and placed on 3L NC. Then was started on Digoxin and admitted for further management.  (03 Sep 2021 13:57)       Problem/Plan - 1:  ·  Problem: New onset atrial fibrillation.   ·  Plan: Patient with new onset atrial fibrillation with RVR, POA.    Echo with normal LV systolic function, mild pulm HTN   BP and HR improved   Continue metoprolol tartrate 25mg bid for rate control. If HR trends up, resume diltiazem   Continue to HOLD Lisinopril&Furosemide in view of ROBERT   Continue Eliquis 5mg bid for anticoagulation.    Cardiology follow up appreciated  - D-dimer significantly elevated - possible PE although cannot get CTA chest due to ROBERT         Problem/Plan - 2:  ·  Problem: ROBERT (acute kidney injury), L renal lesion, hematuria, hydronephrosis  ·  Plan: Patient with ROBERT, POA.   Possibly ischemic (syncope) while on ACEI  Creat improved (1.1<--- 2.54 on admission) with IVF, off IVF this AM.   Monitor renal indices  Hydronephrosis improved  Larkin in place- TOV today  Nephrology follow up appreciated  Will need  Urology follow up for L renal lesion, hematuria, prostatomegaly       Problem/Plan - 3:  ·  Problem: Hypertension.   ·  Plan: Continue Metoprolol.   Lisinopril, Furosemide can be resumed on discharge, as ROBERT resolved, currently BP low normal  Monitor blood pressure per routine.     Problem/Plan - 4:  ·  Problem: Hyperlipidemia.   ·  Plan: Continue Lipitor.     Problem/Plan - 5:  ·  Problem: COPD (chronic obstructive pulmonary disease).   ·  Plan: Continue Symbicort and Spiriva.   Oxygen supplementation as needed.     Problem/Plan - 6:  ·  Problem: Prophylactic measure.   ·  Plan: On Eliquis 5mg bid for anticoagulation.     80M HTN, HLD, COPD, Hemachromatosis who was in Rehab after being admitted in [facility] from 8/9 thru 8/23 for Crystal Arthropathy and RLE Cellulitis treated with Antibiotics and Steroids. Patient today was reported to have syncopized, where patient does not recall the details of, but does state that for the past 2 days has been not feeling well and increasing SOB.  No chest pain or palpitations. No Fevers or chills. No nausea or vomiting.      In the ED routine labs and imaging done.  Was found to be in Atrial Fibrillation with RVR, Acute Renal Failure, and elevated D. Dimer.  EKG revealed Atrial Fibrillation with 's. Given IVF and Metoprolol initially.  Patient also hypoxic and placed on 3L NC. Then was started on Digoxin and admitted for further management.  (03 Sep 2021 13:57)       Problem/Plan - 1:  ·  Problem: New onset atrial fibrillation.   ·  Plan: Patient with new onset atrial fibrillation with RVR, POA.    Echo with normal LV systolic function, mild pulm HTN   BP and HR improved   Continue metoprolol tartrate 25mg bid for rate control. If HR trends up, resume diltiazem   Continue to HOLD Lisinopril&Furosemide in view of ROBERT   Continue Eliquis 5mg bid for anticoagulation.    Cardiology follow up appreciated   D-dimer significantly elevated - likely a combination of factors ( age - inflammatory state - ROBERT - hemachromatosis ) -would not pursue VQ scan          Problem/Plan - 2:  ·  Problem: ROBERT (acute kidney injury), L renal lesion, hematuria, hydronephrosis  ·  Plan: Patient with ROBERT, POA.   Possibly ischemic (syncope) while on ACEI  Creat improved (1.1<--- 2.54 on admission) with IVF, off IVF this AM.   Monitor renal indices  Hydronephrosis improved  Larkin in place- TOV today  Nephrology follow up appreciated  ct chest and abd reviewed  renal US noted  Will need  Urology follow up for L renal lesion, hematuria, prostatomegaly       Problem/Plan - 3:  ·  Problem: Hypertension.   ·  Plan: Continue Metoprolol.   Lisinopril, Furosemide can be resumed on discharge, as ROBERT resolved, currently BP low normal  Monitor blood pressure per routine.     Problem/Plan - 4:  ·  Problem: Hyperlipidemia.   ·  Plan: Continue Lipitor.     Problem/Plan - 5:  ·  Problem: COPD (chronic obstructive pulmonary disease).   ·  Plan: Continue Symbicort and Spiriva.   Oxygen supplementation as needed.  PFT as outpt     Problem/Plan - 6:  ·  Problem: Prophylactic measure.   ·  Plan: On Eliquis 5mg bid for anticoagulation.      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

## 2021-09-06 NOTE — PROGRESS NOTE ADULT - SUBJECTIVE AND OBJECTIVE BOX
Date/Time Patient Seen:  		  Referring MD:   Data Reviewed	       Patient is a 80y old  Male who presents with a chief complaint of Syncope and SOB (06 Sep 2021 09:02)      Subjective/HPI     PAST MEDICAL & SURGICAL HISTORY:  No pertinent past medical history    Gout    Cellulitis  Right lower leg    COPD exacerbation    HTN (hypertension)    H/O ulcerative colitis    Hyperlipidemia    History of hemochromatosis    History of hemochromatosis          Medication list         MEDICATIONS  (STANDING):  apixaban 5 milliGRAM(s) Oral two times a day  atorvastatin 10 milliGRAM(s) Oral at bedtime  budesonide 160 MICROgram(s)/formoterol 4.5 MICROgram(s) Inhaler 2 Puff(s) Inhalation two times a day  influenza   Vaccine 0.5 milliLiter(s) IntraMuscular once  metoprolol tartrate 25 milliGRAM(s) Oral two times a day  nystatin Powder 1 Application(s) Topical two times a day  tiotropium 18 MICROgram(s) Capsule 1 Capsule(s) Inhalation daily    MEDICATIONS  (PRN):  acetaminophen   Tablet .. 650 milliGRAM(s) Oral every 6 hours PRN Temp greater or equal to 38C (100.4F), Mild Pain (1 - 3)         Vitals log        ICU Vital Signs Last 24 Hrs  T(C): 36.6 (06 Sep 2021 05:21), Max: 37 (05 Sep 2021 17:11)  T(F): 97.8 (06 Sep 2021 05:21), Max: 98.6 (05 Sep 2021 17:11)  HR: 78 (06 Sep 2021 05:21) (74 - 97)  BP: 152/88 (06 Sep 2021 05:21) (110/87 - 152/90)  BP(mean): --  ABP: --  ABP(mean): --  RR: 18 (06 Sep 2021 05:21) (18 - 18)  SpO2: 99% (06 Sep 2021 05:21) (98% - 99%)           Input and Output:  I&O's Detail    05 Sep 2021 07:01  -  06 Sep 2021 07:00  --------------------------------------------------------  IN:    dextrose 5% + sodium chloride 0.45%: 1000 mL    Oral Fluid: 360 mL  Total IN: 1360 mL    OUT:    Indwelling Catheter - Urethral (mL): 1520 mL  Total OUT: 1520 mL    Total NET: -160 mL          Lab Data                        11.1   7.66  )-----------( 156      ( 06 Sep 2021 07:22 )             33.4     09-06    142  |  111<H>  |  34<H>  ----------------------------<  112<H>  3.9   |  25  |  1.19    Ca    7.8<L>      06 Sep 2021 07:22    TPro  5.1<L>  /  Alb  1.9<L>  /  TBili  0.8  /  DBili  x   /  AST  15  /  ALT  36  /  AlkPhos  113  09-05            Review of Systems	      Objective     Physical Examination    heart s1s2  lung dec BS      Pertinent Lab findings & Imaging      Trae:  NO   Adequate UO     I&O's Detail    05 Sep 2021 07:01  -  06 Sep 2021 07:00  --------------------------------------------------------  IN:    dextrose 5% + sodium chloride 0.45%: 1000 mL    Oral Fluid: 360 mL  Total IN: 1360 mL    OUT:    Indwelling Catheter - Urethral (mL): 1520 mL  Total OUT: 1520 mL    Total NET: -160 mL               Discussed with:     Cultures:	        Radiology

## 2021-09-06 NOTE — DIETITIAN NUTRITION RISK NOTIFICATION - ADDITIONAL COMMENTS/DIETITIAN RECOMMENDATIONS
debilitated  Patient presents with depletion of muscle mass, moderate in temporal region, mild in shoulder and clavicle region.   Depletion of fat - moderate in triceps region and mild overlying the ribs   Presents with moderate malnutrition in context of chronic illness

## 2021-09-06 NOTE — PROGRESS NOTE ADULT - ASSESSMENT
80 year old male with a history of HTN, HL, COPD, hemochromatosis who presents with syncope and rapid atrial fibrillation.    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

## 2021-09-06 NOTE — DIETITIAN NUTRITION RISK NOTIFICATION - TREATMENT: THE FOLLOWING DIET HAS BEEN RECOMMENDED
Diet, DASH/TLC:   Sodium & Cholesterol Restricted  Supplement Feeding Modality:  Oral  Ensure Enlive Cans or Servings Per Day:  1       Frequency:  Two Times a day (09-05-21 @ 14:25) [Active]

## 2021-09-06 NOTE — PROGRESS NOTE ADULT - SUBJECTIVE AND OBJECTIVE BOX
Subjective: improving appetite, oral fluids intake.       MEDICATIONS  (STANDING):  apixaban 5 milliGRAM(s) Oral two times a day  atorvastatin 10 milliGRAM(s) Oral at bedtime  budesonide 160 MICROgram(s)/formoterol 4.5 MICROgram(s) Inhaler 2 Puff(s) Inhalation two times a day  influenza   Vaccine 0.5 milliLiter(s) IntraMuscular once  metoprolol tartrate 25 milliGRAM(s) Oral two times a day  nystatin Powder 1 Application(s) Topical two times a day  tiotropium 18 MICROgram(s) Capsule 1 Capsule(s) Inhalation daily    MEDICATIONS  (PRN):  acetaminophen   Tablet .. 650 milliGRAM(s) Oral every 6 hours PRN Temp greater or equal to 38C (100.4F), Mild Pain (1 - 3)          T(C): 36.6 (21 @ 05:21), Max: 37 (21 @ 17:11)  HR: 78 (21 @ 05:21) (74 - 97)  BP: 152/88 (21 @ 05:21) (110/87 - 152/90)  RR: 18 (21 @ 05:21) (18 - 18)  SpO2: 99% (21 @ 05:21) (98% - 99%)  Wt(kg): --        I&O's Detail    05 Sep 2021 07:01  -  06 Sep 2021 07:00  --------------------------------------------------------  IN:    dextrose 5% + sodium chloride 0.45%: 1000 mL    Oral Fluid: 360 mL  Total IN: 1360 mL    OUT:    Indwelling Catheter - Urethral (mL): 1520 mL  Total OUT: 1520 mL    Total NET: -160 mL               PHYSICAL EXAM:    GENERAL: NAD  NECK: Supple, no inc in JVP  CHEST/LUNG: Clear  HEART: S1S2  ABDOMEN: Soft  EXTREMITIES:  min edema        LABS:  CBC Full  -  ( 06 Sep 2021 07:22 )  WBC Count : 7.66 K/uL  RBC Count : 3.33 M/uL  Hemoglobin : 11.1 g/dL  Hematocrit : 33.4 %  Platelet Count - Automated : 156 K/uL  Mean Cell Volume : 100.3 fl  Mean Cell Hemoglobin : 33.3 pg  Mean Cell Hemoglobin Concentration : 33.2 gm/dL  Auto Neutrophil # : 5.92 K/uL  Auto Lymphocyte # : 0.86 K/uL  Auto Monocyte # : 0.51 K/uL  Auto Eosinophil # : 0.26 K/uL  Auto Basophil # : 0.02 K/uL  Auto Neutrophil % : 77.2 %  Auto Lymphocyte % : 11.2 %  Auto Monocyte % : 6.7 %  Auto Eosinophil % : 3.4 %  Auto Basophil % : 0.3 %        142  |  111<H>  |  34<H>  ----------------------------<  112<H>  3.9   |  25  |  1.19    Ca    7.8<L>      06 Sep 2021 07:22    TPro  5.1<L>  /  Alb  1.9<L>  /  TBili  0.8  /  DBili  x   /  AST  15  /  ALT  36  /  AlkPhos  113  -05      Urinalysis Basic - ( 04 Sep 2021 12:29 )    Color: Brown / Appearance: Turbid / S.015 / pH: x  Gluc: x / Ketone: Trace  / Bili: Small / Urobili: 1 mg/dL   Blood: x / Protein: 100 mg/dL / Nitrite: Positive   Leuk Esterase: Moderate / RBC: >50 /HPF / WBC 3-5   Sq Epi: x / Non Sq Epi: Many / Bacteria: Moderate          Impression:  * ROBERT -- suspect ischemic ATN ( syncope, hemodynamic effect of VICTORINO ) while on ACE-I  * HyperK -- ROBERT, ACE-I effect, exogenous K. Controlled.   * Syncope  * VICTORINO. Controlled.   * L renal lesion, hematuria, improved hydro.     Recommendations:   * D/c IVFs  * May resume low dose ACE-I, Lasix on dc  * Urology to address hematuria, L renal lesion.      Thank you!

## 2021-09-06 NOTE — PROGRESS NOTE ADULT - SUBJECTIVE AND OBJECTIVE BOX
Chief Complaint: Syncope    Interval Events: No events overnight.    Review of Systems:  General: No fevers, chills, weight loss or gain  Skin: No rashes, color changes  Cardiovascular: No chest pain, orthopnea  Respiratory: No shortness of breath, cough  Gastrointestinal: No nausea, abdominal pain  Genitourinary: No incontinence, pain with urination  Musculoskeletal: No pain, swelling, decreased range of motion  Neurological: No headache, weakness  Psychiatric: No depression, anxiety  Endocrine: No weight loss or gain, increased thirst  All other systems are comprehensively negative.    Physical Exam:  Vital Signs Last 24 Hrs  T(C): 36.6 (06 Sep 2021 05:21), Max: 37 (05 Sep 2021 17:11)  T(F): 97.8 (06 Sep 2021 05:21), Max: 98.6 (05 Sep 2021 17:11)  HR: 78 (06 Sep 2021 05:21) (74 - 97)  BP: 152/88 (06 Sep 2021 05:21) (110/87 - 152/90)  BP(mean): --  RR: 18 (06 Sep 2021 05:21) (18 - 18)  SpO2: 99% (06 Sep 2021 05:21) (98% - 99%)  General: NAD  HEENT: MMM  Neck: No JVD, no carotid bruit  Lungs: CTAB  CV: RRR, nl S1/S2, no M/R/G  Abdomen: S/NT/ND, +BS  Extremities: No LE edema, no cyanosis  Neuro: AAOx3, non-focal  Skin: No rash    Labs:    09-06    142  |  111<H>  |  34<H>  ----------------------------<  112<H>  3.9   |  25  |  1.19    Ca    7.8<L>      06 Sep 2021 07:22    TPro  5.1<L>  /  Alb  1.9<L>  /  TBili  0.8  /  DBili  x   /  AST  15  /  ALT  36  /  AlkPhos  113  09-05                        11.1   7.66  )-----------( 156      ( 06 Sep 2021 07:22 )             33.4       Telemetry: AF, PVCs

## 2021-09-06 NOTE — PROGRESS NOTE ADULT - NUTRITIONAL ASSESSMENT
This patient has been assessed with a concern for Malnutrition and has been determined to have a diagnosis/diagnoses of Moderate protein-calorie malnutrition.    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 - US doppler arterial noted  LE doppler neg in [facility] Hospital admission  pt had normal Cr on dc from [facility] on Aug 19 - ROBERT - renal cx - s/p IVF - I and O - serial renal indices  D dimer - elev - likely a combination of factors - age - inflammatory state - ROBERT - hemachromatosis - planned for Eliquis or lovenox as per Cardio for AF  would not pursue VQ scan     COPD rx regimen on order - ex smoker - PFT as outpatient - non emergent    serial LABS  monitor VS and HD  GOC discussion  Cardio eval noted This patient has been assessed with a concern for Malnutrition and has been determined to have a diagnosis/diagnoses of Moderate protein-calorie malnutrition.    This patient is being managed with:   Diet DASH/TLC-  Sodium & Cholesterol Restricted  Supplement Feeding Modality:  Oral  Ensure Enlive Cans or Servings Per Day:  1       Frequency:  Two Times a day  Entered: Sep  5 2021  2:24PM

## 2021-09-06 NOTE — PROGRESS NOTE ADULT - SUBJECTIVE AND OBJECTIVE BOX
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 Patient today was reported to have syncopized, where patient does not recall the details of, but does state that for the past 2 days has been not feeling well and increasing SOB.  No chest pain or palpitations. No Fevers or chills. No nausea or vomiting.     INTERVAL HPI/OVERNIGHT EVENTS:  Chart reviewed, notes reviewed.   Patient seen and examined.  Being followed by following specialists: cardiology, pulmonary, nephrology.     Consultant(s) Notes Reviewed:  [X] Yes    Care Discussed with Consultants/Other Providers: [X] Yes    MEDICATIONS  (STANDING):  apixaban 5 milliGRAM(s) Oral two times a day  atorvastatin 10 milliGRAM(s) Oral at bedtime  budesonide 160 MICROgram(s)/formoterol 4.5 MICROgram(s) Inhaler 2 Puff(s) Inhalation two times a day  influenza   Vaccine 0.5 milliLiter(s) IntraMuscular once  metoprolol tartrate 25 milliGRAM(s) Oral two times a day  nystatin Powder 1 Application(s) Topical two times a day  tiotropium 18 MICROgram(s) Capsule 1 Capsule(s) Inhalation daily    MEDICATIONS  (PRN):  acetaminophen   Tablet .. 650 milliGRAM(s) Oral every 6 hours PRN Temp greater or equal to 38C (100.4F), Mild Pain (1 - 3)      Allergies    No Known Allergies    Intolerances        REVIEW OF SYSTEMS:  CONSTITUTIONAL: No fever, weight loss, or fatigue  EYES: No eye pain, visual disturbances, or discharge  ENMT:  No difficulty hearing, tinnitus, vertigo; No sinus or throat pain  NECK: No pain or stiffness  BREASTS: No pain, masses, or nipple discharge  RESPIRATORY: No cough, wheezing, chills or hemoptysis; No shortness of breath  CARDIOVASCULAR: No chest pain, palpitations, lightheadedness, or leg swelling  GASTROINTESTINAL: No abdominal or epigastric pain. No nausea, vomiting, or hematemesis; No diarrhea or constipation. No melena or hematochezia.  GENITOURINARY: No dysuria, frequency, hematuria, or incontinence  NEUROLOGICAL: No headaches, memory loss, vertigo, loss of strength, numbness, or tremors  SKIN: No itching, burning, rashes, or lesions   LYMPH NODES: No enlarged glands  ENDOCRINE: No heat or cold intolerance; No hair loss; No polydipsia or polyuria  MUSCULOSKELETAL: No joint pain or swelling; No muscle, back, or extremity pain  PSYCHIATRIC: No depression, anxiety, or mood swings  HEME/LYMPH: No easy bruising, or bleeding gums  ALLERGY AND IMMUNOLOGIC: No hives or eczema    Vital Signs Last 24 Hrs  T(C): 36.8 (06 Sep 2021 10:00), Max: 37 (05 Sep 2021 17:11)  T(F): 98.2 (06 Sep 2021 10:00), Max: 98.6 (05 Sep 2021 17:11)  HR: 82 (06 Sep 2021 10:00) (74 - 97)  BP: 117/74 (06 Sep 2021 10:00) (110/87 - 152/90)  BP(mean): --  RR: 18 (06 Sep 2021 10:00) (18 - 18)  SpO2: 98% (06 Sep 2021 10:00) (98% - 99%)    PHYSICAL EXAM:  GENERAL: NAD, well-groomed, well-developed  HEAD:  Atraumatic, Normocephalic  EYES: EOMI, PERRLA, conjunctiva and sclera clear  ENMT: Moist mucous membranes, Good dentition, No lesions; No tonsillar erythema, exudates, or enlargement  NECK: Supple, No JVD, Normal thyroid  NERVOUS SYSTEM:  Alert & Oriented X3, Good concentration; All 4 extremities mobile, no gross sensory deficits.   CHEST/LUNG: Clear to auscultation bilaterally; No rales, rhonchi, wheezing, or rubs  HEART: Regular rate and rhythm; No murmurs, rubs, or gallops  ABDOMEN: Soft, Nontender, Nondistended; Bowel sounds present  EXTREMITIES:  2+ Peripheral Pulses, No clubbing, cyanosis, or edema  LYMPH: No lymphadenopathy noted  SKIN: No rashes or lesions      I&O's Detail    05 Sep 2021 07:01  -  06 Sep 2021 07:00  --------------------------------------------------------  IN:    dextrose 5% + sodium chloride 0.45%: 1000 mL    Oral Fluid: 360 mL  Total IN: 1360 mL    OUT:    Indwelling Catheter - Urethral (mL): 1520 mL  Total OUT: 1520 mL    Total NET: -160 mL    LABS:                        11.1   7.66  )-----------( 156      ( 06 Sep 2021 07:22 )             33.4     06 Sep 2021 07:22    142    |  111    |  34     ----------------------------<  112    3.9     |  25     |  1.19     Ca    7.8        06 Sep 2021 07:22        Urinalysis Basic - ( 04 Sep 2021 12:29 )    Color: Brown / Appearance: Turbid / S.015 / pH: x  Gluc: x / Ketone: Trace  / Bili: Small / Urobili: 1 mg/dL   Blood: x / Protein: 100 mg/dL / Nitrite: Positive   Leuk Esterase: Moderate / RBC: >50 /HPF / WBC 3-5   Sq Epi: x / Non Sq Epi: Many / Bacteria: Moderate      CAPILLARY BLOOD GLUCOSE          RADIOLOGY & ADDITIONAL TESTS:    Imaging Personally Reviewed:  [ ] YES     Consultant(s) Notes Reviewed:      Care Discussed with Consultants/Other Providers:    Advanced Directives: [ ] DNR  [ ] No feeding tube  [ ] MOLST in chart  [ ] MOLST completed today  [ ] Unknown   Patient is a 80y old  Male who presents with a chief complaint of Syncope and SOB (06 Sep 2021 09:43)    HPI: 80M HTN, HLD, COPD, Hemachromatosis who was in Rehab after being admitted in [facility] from  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 Patient today was reported to have syncopized, where patient does not recall the details of, but does state that for the past 2 days has been not feeling well and increasing SOB.  No chest pain or palpitations. No Fevers or chills. No nausea or vomiting.     INTERVAL HPI/OVERNIGHT EVENTS: afebrile, tolerating PO, improving  Chart reviewed, notes reviewed.   Patient seen and examined.  Being followed by following specialists: cardiology, pulmonary, nephrology.     Consultant(s) Notes Reviewed:  [X] Yes    Care Discussed with Consultants/Other Providers: [X] Yes    MEDICATIONS  (STANDING):  apixaban 5 milliGRAM(s) Oral two times a day  atorvastatin 10 milliGRAM(s) Oral at bedtime  budesonide 160 MICROgram(s)/formoterol 4.5 MICROgram(s) Inhaler 2 Puff(s) Inhalation two times a day  influenza   Vaccine 0.5 milliLiter(s) IntraMuscular once  metoprolol tartrate 25 milliGRAM(s) Oral two times a day  nystatin Powder 1 Application(s) Topical two times a day  tiotropium 18 MICROgram(s) Capsule 1 Capsule(s) Inhalation daily    MEDICATIONS  (PRN):  acetaminophen   Tablet .. 650 milliGRAM(s) Oral every 6 hours PRN Temp greater or equal to 38C (100.4F), Mild Pain (1 - 3)      Allergies    No Known Allergies    Intolerances        REVIEW OF SYSTEMS:  CONSTITUTIONAL: No fever, weight loss, or fatigue  EYES: No eye pain, visual disturbances, or discharge  ENMT:  No difficulty hearing, tinnitus, vertigo; No sinus or throat pain  NECK: No pain or stiffness  BREASTS: No pain, masses, or nipple discharge  RESPIRATORY: No cough, wheezing, chills or hemoptysis; No shortness of breath  CARDIOVASCULAR: No chest pain, palpitations, lightheadedness, or leg swelling  GASTROINTESTINAL: No abdominal or epigastric pain. No nausea, vomiting, or hematemesis; No diarrhea or constipation. No melena or hematochezia.  GENITOURINARY: No dysuria, frequency, hematuria, or incontinence  NEUROLOGICAL: No headaches, memory loss, vertigo, loss of strength, numbness, or tremors  SKIN: No itching, burning, rashes, or lesions   LYMPH NODES: No enlarged glands  ENDOCRINE: No heat or cold intolerance; No hair loss; No polydipsia or polyuria  MUSCULOSKELETAL: No joint pain or swelling; No muscle, back, or extremity pain  PSYCHIATRIC: No depression, anxiety, or mood swings  HEME/LYMPH: No easy bruising, or bleeding gums  ALLERGY AND IMMUNOLOGIC: No hives or eczema    Vital Signs Last 24 Hrs  T(C): 36.8 (06 Sep 2021 10:00), Max: 37 (05 Sep 2021 17:11)  T(F): 98.2 (06 Sep 2021 10:00), Max: 98.6 (05 Sep 2021 17:11)  HR: 82 (06 Sep 2021 10:00) (74 - 97)  BP: 117/74 (06 Sep 2021 10:00) (110/87 - 152/90)  BP(mean): --  RR: 18 (06 Sep 2021 10:00) (18 - 18)  SpO2: 98% (06 Sep 2021 10:00) (98% - 99%)    PHYSICAL EXAM:  GENERAL: NAD, well-groomed, well-developed  HEAD:  Atraumatic, Normocephalic  EYES: EOMI, PERRLA, conjunctiva and sclera clear  ENMT: Moist mucous membranes, Good dentition, No lesions; No tonsillar erythema, exudates, or enlargement  NECK: Supple, No JVD, Normal thyroid  NERVOUS SYSTEM:  Alert & Oriented X3, Good concentration; All 4 extremities mobile, no gross sensory deficits.   CHEST/LUNG: Clear to auscultation bilaterally; No rales, rhonchi, wheezing, or rubs  HEART: Regular rate and rhythm; No murmurs, rubs, or gallops  ABDOMEN: Soft, Nontender, Nondistended; Bowel sounds present  EXTREMITIES:  2+ Peripheral Pulses, No clubbing, cyanosis, or edema  LYMPH: No lymphadenopathy noted  SKIN: No rashes or lesions      I&O's Detail    05 Sep 2021 07:01  -  06 Sep 2021 07:00  --------------------------------------------------------  IN:    dextrose 5% + sodium chloride 0.45%: 1000 mL    Oral Fluid: 360 mL  Total IN: 1360 mL    OUT:    Indwelling Catheter - Urethral (mL): 1520 mL  Total OUT: 1520 mL    Total NET: -160 mL    LABS:                        11.1   7.66  )-----------( 156      ( 06 Sep 2021 07:22 )             33.4     06 Sep 2021 07:22    142    |  111    |  34     ----------------------------<  112    3.9     |  25     |  1.19     Ca    7.8        06 Sep 2021 07:22        Urinalysis Basic - ( 04 Sep 2021 12:29 )    Color: Brown / Appearance: Turbid / S.015 / pH: x  Gluc: x / Ketone: Trace  / Bili: Small / Urobili: 1 mg/dL   Blood: x / Protein: 100 mg/dL / Nitrite: Positive   Leuk Esterase: Moderate / RBC: >50 /HPF / WBC 3-5   Sq Epi: x / Non Sq Epi: Many / Bacteria: Moderate      CAPILLARY BLOOD GLUCOSE          RADIOLOGY & ADDITIONAL TESTS:    Imaging Personally Reviewed:  [ ] YES     Consultant(s) Notes Reviewed:      Care Discussed with Consultants/Other Providers:    Advanced Directives: [ ] DNR  [ ] No feeding tube  [ ] MOLST in chart  [ ] MOLST completed today  [ ] Unknown

## 2021-09-07 DIAGNOSIS — R33.9 RETENTION OF URINE, UNSPECIFIED: ICD-10-CM

## 2021-09-07 DIAGNOSIS — N20.1 CALCULUS OF URETER: ICD-10-CM

## 2021-09-07 DIAGNOSIS — N20.0 CALCULUS OF KIDNEY: ICD-10-CM

## 2021-09-07 LAB
ANION GAP SERPL CALC-SCNC: 7 MMOL/L — SIGNIFICANT CHANGE UP (ref 5–17)
BUN SERPL-MCNC: 26 MG/DL — HIGH (ref 7–23)
CALCIUM SERPL-MCNC: 7.3 MG/DL — LOW (ref 8.4–10.5)
CHLORIDE SERPL-SCNC: 110 MMOL/L — HIGH (ref 96–108)
CO2 SERPL-SCNC: 26 MMOL/L — SIGNIFICANT CHANGE UP (ref 22–31)
CREAT SERPL-MCNC: 1.08 MG/DL — SIGNIFICANT CHANGE UP (ref 0.5–1.3)
GLUCOSE SERPL-MCNC: 90 MG/DL — SIGNIFICANT CHANGE UP (ref 70–99)
HCT VFR BLD CALC: 32.5 % — LOW (ref 39–50)
HGB BLD-MCNC: 10.9 G/DL — LOW (ref 13–17)
MCHC RBC-ENTMCNC: 33.3 PG — SIGNIFICANT CHANGE UP (ref 27–34)
MCHC RBC-ENTMCNC: 33.5 GM/DL — SIGNIFICANT CHANGE UP (ref 32–36)
MCV RBC AUTO: 99.4 FL — SIGNIFICANT CHANGE UP (ref 80–100)
NRBC # BLD: 0 /100 WBCS — SIGNIFICANT CHANGE UP (ref 0–0)
PLATELET # BLD AUTO: 182 K/UL — SIGNIFICANT CHANGE UP (ref 150–400)
POTASSIUM SERPL-MCNC: 3.2 MMOL/L — LOW (ref 3.5–5.3)
POTASSIUM SERPL-SCNC: 3.2 MMOL/L — LOW (ref 3.5–5.3)
RBC # BLD: 3.27 M/UL — LOW (ref 4.2–5.8)
RBC # FLD: 13.2 % — SIGNIFICANT CHANGE UP (ref 10.3–14.5)
SODIUM SERPL-SCNC: 143 MMOL/L — SIGNIFICANT CHANGE UP (ref 135–145)
WBC # BLD: 6.83 K/UL — SIGNIFICANT CHANGE UP (ref 3.8–10.5)
WBC # FLD AUTO: 6.83 K/UL — SIGNIFICANT CHANGE UP (ref 3.8–10.5)

## 2021-09-07 PROCEDURE — 76770 US EXAM ABDO BACK WALL COMP: CPT | Mod: 26

## 2021-09-07 PROCEDURE — 99232 SBSQ HOSP IP/OBS MODERATE 35: CPT

## 2021-09-07 PROCEDURE — 74018 RADEX ABDOMEN 1 VIEW: CPT | Mod: 26

## 2021-09-07 RX ORDER — TAMSULOSIN HYDROCHLORIDE 0.4 MG/1
0.4 CAPSULE ORAL AT BEDTIME
Refills: 0 | Status: DISCONTINUED | OUTPATIENT
Start: 2021-09-07 | End: 2021-09-08

## 2021-09-07 RX ORDER — POTASSIUM CHLORIDE 20 MEQ
40 PACKET (EA) ORAL EVERY 4 HOURS
Refills: 0 | Status: COMPLETED | OUTPATIENT
Start: 2021-09-07 | End: 2021-09-07

## 2021-09-07 RX ADMIN — TAMSULOSIN HYDROCHLORIDE 0.4 MILLIGRAM(S): 0.4 CAPSULE ORAL at 22:19

## 2021-09-07 RX ADMIN — APIXABAN 5 MILLIGRAM(S): 2.5 TABLET, FILM COATED ORAL at 06:00

## 2021-09-07 RX ADMIN — BUDESONIDE AND FORMOTEROL FUMARATE DIHYDRATE 2 PUFF(S): 160; 4.5 AEROSOL RESPIRATORY (INHALATION) at 06:01

## 2021-09-07 RX ADMIN — Medication 25 MILLIGRAM(S): at 06:00

## 2021-09-07 RX ADMIN — NYSTATIN CREAM 1 APPLICATION(S): 100000 CREAM TOPICAL at 17:38

## 2021-09-07 RX ADMIN — Medication 40 MILLIEQUIVALENT(S): at 15:27

## 2021-09-07 RX ADMIN — TIOTROPIUM BROMIDE 1 CAPSULE(S): 18 CAPSULE ORAL; RESPIRATORY (INHALATION) at 05:59

## 2021-09-07 RX ADMIN — NYSTATIN CREAM 1 APPLICATION(S): 100000 CREAM TOPICAL at 06:00

## 2021-09-07 RX ADMIN — ATORVASTATIN CALCIUM 10 MILLIGRAM(S): 80 TABLET, FILM COATED ORAL at 22:19

## 2021-09-07 RX ADMIN — Medication 25 MILLIGRAM(S): at 17:38

## 2021-09-07 RX ADMIN — APIXABAN 5 MILLIGRAM(S): 2.5 TABLET, FILM COATED ORAL at 17:38

## 2021-09-07 RX ADMIN — Medication 40 MILLIEQUIVALENT(S): at 11:28

## 2021-09-07 NOTE — CONSULT NOTE ADULT - PROBLEM SELECTOR RECOMMENDATION 9
18 FR coude placed without difficulty, and iglesias should remain until pt is ambulating in rehab. Pt has been started on flomax

## 2021-09-07 NOTE — PROGRESS NOTE ADULT - SUBJECTIVE AND OBJECTIVE BOX
Chief Complaint: Syncope    Interval Events: No events overnight.    Review of Systems:  General: No fevers, chills, weight loss or gain  Skin: No rashes, color changes  Cardiovascular: No chest pain, orthopnea  Respiratory: No shortness of breath, cough  Gastrointestinal: No nausea, abdominal pain  Genitourinary: No incontinence, pain with urination  Musculoskeletal: No pain, swelling, decreased range of motion  Neurological: No headache, weakness  Psychiatric: No depression, anxiety  Endocrine: No weight loss or gain, increased thirst  All other systems are comprehensively negative.    Physical Exam:  Vital Signs Last 24 Hrs  T(C): 36.7 (07 Sep 2021 05:47), Max: 37 (07 Sep 2021 01:12)  T(F): 98 (07 Sep 2021 05:47), Max: 98.6 (07 Sep 2021 01:12)  HR: 61 (07 Sep 2021 05:47) (61 - 97)  BP: 138/85 (07 Sep 2021 05:47) (117/74 - 146/87)  BP(mean): --  RR: 18 (07 Sep 2021 05:47) (18 - 18)  SpO2: 99% (07 Sep 2021 05:47) (97% - 100%)  General: NAD  HEENT: MMM  Neck: No JVD, no carotid bruit  Lungs: CTAB  CV: RRR, nl S1/S2, no M/R/G  Abdomen: S/NT/ND, +BS  Extremities: No LE edema, no cyanosis  Neuro: AAOx3, non-focal  Skin: No rash    Labs:    09-07    143  |  110<H>  |  26<H>  ----------------------------<  90  3.2<L>   |  26  |  1.08    Ca    7.3<L>      07 Sep 2021 07:49                          10.9   6.83  )-----------( 182      ( 07 Sep 2021 07:49 )             32.5       Telemetry: AF, PVCs

## 2021-09-07 NOTE — PROGRESS NOTE ADULT - ASSESSMENT
The patient is an 80 year old male with a history of HTN, HL, COPD, hemochromatosis who presents with syncope and rapid atrial fibrillation.    Plan:  - Echo with normal LV systolic function, mild pulm HTN  - BP and HR improved  - Continue metoprolol tartrate 25 mg bid  - If HR trends up, resume diltiazem  - Hold lisinopril  - Hold furosemide - resume when ok from a renal standpoint  - D-dimer significantly elevated - possible PE although cannot get CTA chest due to ROBERT  - Renal function improving with IV fluids  - Given possible VTE and ROBERT (although improving) with new AF, continue apixaban at 5 mg bid  - Urology eval

## 2021-09-07 NOTE — PROGRESS NOTE ADULT - ASSESSMENT
80M HTN, HLD, COPD, Hemachromatosis who was in Rehab after being admitted in [facility] from 8/9 thru 8/23 for Crystal Arthropathy and RLE Cellulitis treated with Antibiotics and Steroids. Patient today was reported to have syncopized, where patient does not recall the details of, but does state that for the past 2 days has been not feeling well and increasing SOB.  No chest pain or palpitations. No Fevers or chills. No nausea or vomiting.      In the ED routine labs and imaging done.  Was found to be in Atrial Fibrillation with RVR, Acute Renal Failure, and elevated D. Dimer.  EKG revealed Atrial Fibrillation with 's. Given IVF and Metoprolol initially.  Patient also hypoxic and placed on 3L NC. Then was started on Digoxin and admitted for further management.  (03 Sep 2021 13:57)       Problem/Plan - 1:  ·  Problem: New onset atrial fibrillation.   ·  Plan: Patient with new onset atrial fibrillation with RVR, POA.    Echo with normal LV systolic function, mild pulm HTN   BP and HR improved   Continue metoprolol tartrate 25mg bid for rate control. If HR trends up, resume diltiazem   Continue to HOLD Lisinopril&Furosemide in view of ROBERT   Continue Eliquis 5mg bid for anticoagulation.    Cardiology follow up appreciated   D-dimer significantly elevated - likely a combination of factors ( age - inflammatory state - ROBERT - hemachromatosis ) -would not pursue VQ scan          Problem/Plan - 2:  ·  Problem: ROBERT (acute kidney injury), L renal lesion, hematuria, hydronephrosis  ·  Plan: Patient with ROBERT, POA.   Possibly ischemic (syncope) while on ACEI  Creat improved (1.1<--- 2.54 on admission) with IVF, off IVF this AM.   Monitor renal indices  Hydronephrosis improved  Larkin in place- TOV today  Nephrology follow up appreciated  ct chest and abd reviewed  renal US noted  Will need  Urology follow up for L renal lesion, hematuria, prostatomegaly       Problem/Plan - 3:  ·  Problem: Hypertension.   ·  Plan: Continue Metoprolol.   Lisinopril, Furosemide can be resumed on discharge, as ROBERT resolved, currently BP low normal  Monitor blood pressure per routine.     Problem/Plan - 4:  ·  Problem: Hyperlipidemia.   ·  Plan: Continue Lipitor.     Problem/Plan - 5:  ·  Problem: COPD (chronic obstructive pulmonary disease).   ·  Plan: Continue Symbicort and Spiriva.   Oxygen supplementation as needed.  PFT as outpt     Problem/Plan - 6:  ·  Problem: Prophylactic measure.   ·  Plan: On Eliquis 5mg bid for anticoagulation.      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 Patient today was reported to have syncopized, where patient does not recall the details of, but does state that for the past 2 days has been not feeling well and increasing SOB.  No chest pain or palpitations. No Fevers or chills. No nausea or vomiting.      In the ED routine labs and imaging done.  Was found to be in Atrial Fibrillation with RVR, Acute Renal Failure, and elevated D. Dimer.  EKG revealed Atrial Fibrillation with 's. Given IVF and Metoprolol initially.  Patient also hypoxic and placed on 3L NC. Then was started on Digoxin and admitted for further management.  (03 Sep 2021 13:57)       Problem/Plan - 1:  ·  Problem: New onset atrial fibrillation.   ·  Plan: Patient with new onset atrial fibrillation with RVR, POA.    Echo with normal LV systolic function, mild pulm HTN   BP and HR improved   Continue metoprolol tartrate 25mg bid for rate control. If HR trends up, resume diltiazem   Continue to HOLD Lisinopril&Furosemide in view of ROBERT   Continue Eliquis 5mg bid for anticoagulation.    Cardiology follow up appreciated   D-dimer significantly elevated - likely a combination of factors ( age - inflammatory state - ROBERT - hemachromatosis ) -would not pursue VQ scan          Problem/Plan - 2:  ·  Problem: Urinary Obstruction with  ROBERT (acute kidney injury), L renal lesion, hematuria, hydronephrosis, multiple renal stones, prostamegaly  ·  Plan: Care d/w Dr. Qiu in urology, pt found to have suggestion of multiple stones causing some obstruction, KUB ordered, will f/u on results  - ROBERT resolving; pt failed TOV, and PVR elevated, iglesias placed back by urology  - CT shows prostamegaly and care d/w daughter, who states pt has nocturia and hesitancy; as per d/w Dr. Qiu/uro, pt started on flomax       Problem/Plan - 3:  ·  Problem: Hypertension.   ·  Plan: Continue Metoprolol.   Lisinopril, Furosemide can be resumed on discharge, as ROBERT resolved, currently BP low normal  Monitor blood pressure per routine.     Problem/Plan - 4:  ·  Problem: Hyperlipidemia.   ·  Plan: Continue Lipitor.     Problem/Plan - 5:  ·  Problem: COPD (chronic obstructive pulmonary disease).   ·  Plan: Continue Symbicort and Spiriva.   Oxygen supplementation as needed.  PFT as outpt     Problem/Plan - 6:  ·  Problem: Prophylactic measure.   ·  Plan: On Eliquis 5mg bid for anticoagulation.      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

## 2021-09-07 NOTE — PROVIDER CONTACT NOTE (OTHER) - BACKGROUND
Admitted for SOB, new onset afib
Admitted for SOB, new onset afib, ROBERT
pt admitted with afib heart rythmn

## 2021-09-07 NOTE — PROVIDER CONTACT NOTE (OTHER) - SITUATION
Larkin D/C today approximately 16:30, due to void now, patient unable to void despite attempting. Bladder scan reveals 503 mL, no urge to void.
Patient unable to void per urinal this AM, s/p straight cath at approximately 12 AM. Bladder scan shows 465 mL urine, pt denies urge to void. Bright red blood oozing from meatus of penis
pt had 3 beats of v tach and spontaneous pauses ranging from 1 to 2.4 sec.

## 2021-09-07 NOTE — PROVIDER CONTACT NOTE (OTHER) - RECOMMENDATIONS
Continue to monitor
Do not insert Larkin catheter; Dr. Porras will endorse patient's status to oncoming hospitalist, await Urology consult. No need to hold Eliquis at this time, CBC to be drawn this AM.
Straight cath now. Check for void in 6 hours; if no void, contact Dr. Porras for possible Larkin reinsertion

## 2021-09-07 NOTE — PROGRESS NOTE ADULT - SUBJECTIVE AND OBJECTIVE BOX
Date/Time Patient Seen:  		  Referring MD:   Data Reviewed	       Patient is a 80y old  Male who presents with a chief complaint of Syncope and SOB (06 Sep 2021 11:53)      Subjective/HPI     PAST MEDICAL & SURGICAL HISTORY:  No pertinent past medical history    Gout    Cellulitis  Right lower leg    COPD exacerbation    HTN (hypertension)    H/O ulcerative colitis    Hyperlipidemia    History of hemochromatosis    History of hemochromatosis          Medication list         MEDICATIONS  (STANDING):  apixaban 5 milliGRAM(s) Oral two times a day  atorvastatin 10 milliGRAM(s) Oral at bedtime  budesonide 160 MICROgram(s)/formoterol 4.5 MICROgram(s) Inhaler 2 Puff(s) Inhalation two times a day  influenza   Vaccine 0.5 milliLiter(s) IntraMuscular once  metoprolol tartrate 25 milliGRAM(s) Oral two times a day  nystatin Powder 1 Application(s) Topical two times a day  tiotropium 18 MICROgram(s) Capsule 1 Capsule(s) Inhalation daily    MEDICATIONS  (PRN):  acetaminophen   Tablet .. 650 milliGRAM(s) Oral every 6 hours PRN Temp greater or equal to 38C (100.4F), Mild Pain (1 - 3)         Vitals log        ICU Vital Signs Last 24 Hrs  T(C): 36.7 (07 Sep 2021 05:47), Max: 37 (07 Sep 2021 01:12)  T(F): 98 (07 Sep 2021 05:47), Max: 98.6 (07 Sep 2021 01:12)  HR: 61 (07 Sep 2021 05:47) (61 - 97)  BP: 138/85 (07 Sep 2021 05:47) (117/74 - 146/87)  BP(mean): --  ABP: --  ABP(mean): --  RR: 18 (07 Sep 2021 05:47) (18 - 18)  SpO2: 99% (07 Sep 2021 05:47) (97% - 100%)           Input and Output:  I&O's Detail    06 Sep 2021 07:01  -  07 Sep 2021 07:00  --------------------------------------------------------  IN:  Total IN: 0 mL    OUT:    Indwelling Catheter - Urethral (mL): 700 mL    Intermittent Catheterization - Urethral (mL): 520 mL  Total OUT: 1220 mL    Total NET: -1220 mL          Lab Data                        11.1   7.66  )-----------( 156      ( 06 Sep 2021 07:22 )             33.4     09-06    142  |  111<H>  |  34<H>  ----------------------------<  112<H>  3.9   |  25  |  1.19    Ca    7.8<L>      06 Sep 2021 07:22    TPro  5.1<L>  /  Alb  1.9<L>  /  TBili  0.8  /  DBili  x   /  AST  15  /  ALT  36  /  AlkPhos  113  09-05            Review of Systems	      Objective     Physical Examination    heart s1s2  lung dc BS  abd soft      Pertinent Lab findings & Imaging      Trae:  NO   Adequate UO     I&O's Detail    06 Sep 2021 07:01  -  07 Sep 2021 07:00  --------------------------------------------------------  IN:  Total IN: 0 mL    OUT:    Indwelling Catheter - Urethral (mL): 700 mL    Intermittent Catheterization - Urethral (mL): 520 mL  Total OUT: 1220 mL    Total NET: -1220 mL               Discussed with:     Cultures:	        Radiology

## 2021-09-07 NOTE — PROVIDER CONTACT NOTE (OTHER) - ASSESSMENT
pt is in bed. he denies any chest discomfort. No  changes in current conditions.
+fatigue, afib on tele, 02 3L on NC. Previous straight cath yielded 520 mL urine, no resistance met during procedure. On Eliquis per Cardio for afib. Per day hospitalist and nephrologist notes, patient requires Urology consult.
AxO x4, +fatigue. Afib on monitor. 02 sat in 90s on nasal cannula. Previously voiding small amounts dark yellow urine upon admission, bladder scan on 9/4 revealed  > 999 mL urine in the bladder. Larkin inserted for retention at that time. Nephrology following, recommending Urology consult

## 2021-09-07 NOTE — PROGRESS NOTE ADULT - NUTRITIONAL ASSESSMENT
This patient has been assessed with a concern for Malnutrition and has been determined to have a diagnosis/diagnoses of Moderate protein-calorie malnutrition.    This patient is being managed with:   Diet DASH/TLC-  Sodium & Cholesterol Restricted  Supplement Feeding Modality:  Oral  Ensure Enlive Cans or Servings Per Day:  1       Frequency:  Two Times a day  Entered: Sep  5 2021  2:24PM

## 2021-09-07 NOTE — PROGRESS NOTE ADULT - ASSESSMENT
80 year old male with a history of HTN, HL, COPD, hemochromatosis who presents with syncope and rapid atrial fibrillation.    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

## 2021-09-07 NOTE — CONSULT NOTE ADULT - PROBLEM SELECTOR RECOMMENDATION 3
While this stone(s) in the left ureter do not require immediate intervention, as there is no apparent infection, they can not be allowed to remain there indefinitely. Will request KUB and f/u renal sono today.

## 2021-09-07 NOTE — PROGRESS NOTE ADULT - SUBJECTIVE AND OBJECTIVE BOX
SUBJECTIVE:    No acute events overnight, afebrile, hds.  Pt feeling better, and doesn't generally feel like his bladder is full, but understands the results of bladder PVR.  No acute cp, sob, n/v, abd pn.    VITAL SIGNS:    Vital Signs Last 24 Hrs  T(C): 36.3 (07 Sep 2021 14:00), Max: 37 (07 Sep 2021 01:12)  T(F): 97.4 (07 Sep 2021 14:00), Max: 98.6 (07 Sep 2021 01:12)  HR: 87 (07 Sep 2021 14:00) (61 - 97)  BP: 124/76 (07 Sep 2021 14:00) (115/77 - 146/87)  BP(mean): --  RR: 18 (07 Sep 2021 14:00) (18 - 18)  SpO2: 100% (07 Sep 2021 14:00) (97% - 100%)    PHYSICAL EXAM:     GENERAL: NAD  HEENT: eomi, MMM  RESPIRATORY: CTAB  CARDIOVASCULAR: s1 and s2, rrr  ABDOMINAL: soft, nd, nt, +bs  EXTREMITIES: no c/c/e  NEUROLOGICAL: alert and oriented x 3, non-focal  SKIN: no new rashes or lesions   MUSCULOSKELETAL: no gross joint deformity                          10.9   6.83  )-----------( 182      ( 07 Sep 2021 07:49 )             32.5     09-07    143  |  110<H>  |  26<H>  ----------------------------<  90  3.2<L>   |  26  |  1.08    Ca    7.3<L>      07 Sep 2021 07:49        CAPILLARY BLOOD GLUCOSE          MEDICATIONS  (STANDING):  apixaban 5 milliGRAM(s) Oral two times a day  atorvastatin 10 milliGRAM(s) Oral at bedtime  budesonide 160 MICROgram(s)/formoterol 4.5 MICROgram(s) Inhaler 2 Puff(s) Inhalation two times a day  influenza   Vaccine 0.5 milliLiter(s) IntraMuscular once  metoprolol tartrate 25 milliGRAM(s) Oral two times a day  nystatin Powder 1 Application(s) Topical two times a day  potassium chloride    Tablet ER 40 milliEquivalent(s) Oral every 4 hours  tamsulosin 0.4 milliGRAM(s) Oral at bedtime  tiotropium 18 MICROgram(s) Capsule 1 Capsule(s) Inhalation daily       SUBJECTIVE:    No acute events overnight, afebrile, hds.  Pt feeling better, and doesn't generally feel like his bladder is full, but understands the results of bladder PVR.  No acute cp, sob, n/v, abd pn.    VITAL SIGNS:    Vital Signs Last 24 Hrs  T(C): 36.3 (07 Sep 2021 14:00), Max: 37 (07 Sep 2021 01:12)  T(F): 97.4 (07 Sep 2021 14:00), Max: 98.6 (07 Sep 2021 01:12)  HR: 87 (07 Sep 2021 14:00) (61 - 97)  BP: 124/76 (07 Sep 2021 14:00) (115/77 - 146/87)  BP(mean): --  RR: 18 (07 Sep 2021 14:00) (18 - 18)  SpO2: 100% (07 Sep 2021 14:00) (97% - 100%)    PHYSICAL EXAM:     GENERAL: NAD  HEENT: eomi, MMM  RESPIRATORY: CTAB  CARDIOVASCULAR: s1 and s2, rrr  ABDOMINAL: soft, nd, nt, +bs  EXTREMITIES: no c/c/ pedal edema  NEUROLOGICAL: alert and oriented x 3, non-focal  SKIN: no new rashes or lesions   MUSCULOSKELETAL: no gross joint deformity                          10.9   6.83  )-----------( 182      ( 07 Sep 2021 07:49 )             32.5     09-07    143  |  110<H>  |  26<H>  ----------------------------<  90  3.2<L>   |  26  |  1.08    Ca    7.3<L>      07 Sep 2021 07:49        CAPILLARY BLOOD GLUCOSE          MEDICATIONS  (STANDING):  apixaban 5 milliGRAM(s) Oral two times a day  atorvastatin 10 milliGRAM(s) Oral at bedtime  budesonide 160 MICROgram(s)/formoterol 4.5 MICROgram(s) Inhaler 2 Puff(s) Inhalation two times a day  influenza   Vaccine 0.5 milliLiter(s) IntraMuscular once  metoprolol tartrate 25 milliGRAM(s) Oral two times a day  nystatin Powder 1 Application(s) Topical two times a day  potassium chloride    Tablet ER 40 milliEquivalent(s) Oral every 4 hours  tamsulosin 0.4 milliGRAM(s) Oral at bedtime  tiotropium 18 MICROgram(s) Capsule 1 Capsule(s) Inhalation daily

## 2021-09-08 ENCOUNTER — TRANSCRIPTION ENCOUNTER (OUTPATIENT)
Age: 81
End: 2021-09-08

## 2021-09-08 VITALS — HEART RATE: 72 BPM | DIASTOLIC BLOOD PRESSURE: 86 MMHG | SYSTOLIC BLOOD PRESSURE: 132 MMHG | TEMPERATURE: 99 F

## 2021-09-08 LAB
ANION GAP SERPL CALC-SCNC: 7 MMOL/L — SIGNIFICANT CHANGE UP (ref 5–17)
BUN SERPL-MCNC: 19 MG/DL — SIGNIFICANT CHANGE UP (ref 7–23)
CALCIUM SERPL-MCNC: 7.4 MG/DL — LOW (ref 8.4–10.5)
CHLORIDE SERPL-SCNC: 106 MMOL/L — SIGNIFICANT CHANGE UP (ref 96–108)
CO2 SERPL-SCNC: 26 MMOL/L — SIGNIFICANT CHANGE UP (ref 22–31)
CREAT SERPL-MCNC: 1.07 MG/DL — SIGNIFICANT CHANGE UP (ref 0.5–1.3)
GLUCOSE SERPL-MCNC: 167 MG/DL — HIGH (ref 70–99)
HCT VFR BLD CALC: 36.1 % — LOW (ref 39–50)
HGB BLD-MCNC: 12.2 G/DL — LOW (ref 13–17)
MCHC RBC-ENTMCNC: 33.8 GM/DL — SIGNIFICANT CHANGE UP (ref 32–36)
MCHC RBC-ENTMCNC: 33.9 PG — SIGNIFICANT CHANGE UP (ref 27–34)
MCV RBC AUTO: 100.3 FL — HIGH (ref 80–100)
NRBC # BLD: 0 /100 WBCS — SIGNIFICANT CHANGE UP (ref 0–0)
PLATELET # BLD AUTO: 213 K/UL — SIGNIFICANT CHANGE UP (ref 150–400)
POTASSIUM SERPL-MCNC: 3.6 MMOL/L — SIGNIFICANT CHANGE UP (ref 3.5–5.3)
POTASSIUM SERPL-SCNC: 3.6 MMOL/L — SIGNIFICANT CHANGE UP (ref 3.5–5.3)
RBC # BLD: 3.6 M/UL — LOW (ref 4.2–5.8)
RBC # FLD: 13.2 % — SIGNIFICANT CHANGE UP (ref 10.3–14.5)
SARS-COV-2 RNA SPEC QL NAA+PROBE: SIGNIFICANT CHANGE UP
SODIUM SERPL-SCNC: 139 MMOL/L — SIGNIFICANT CHANGE UP (ref 135–145)
WBC # BLD: 9.22 K/UL — SIGNIFICANT CHANGE UP (ref 3.8–10.5)
WBC # FLD AUTO: 9.22 K/UL — SIGNIFICANT CHANGE UP (ref 3.8–10.5)

## 2021-09-08 PROCEDURE — 82550 ASSAY OF CK (CPK): CPT

## 2021-09-08 PROCEDURE — U0003: CPT

## 2021-09-08 PROCEDURE — 85025 COMPLETE CBC W/AUTO DIFF WBC: CPT

## 2021-09-08 PROCEDURE — 82140 ASSAY OF AMMONIA: CPT

## 2021-09-08 PROCEDURE — 74018 RADEX ABDOMEN 1 VIEW: CPT

## 2021-09-08 PROCEDURE — 85379 FIBRIN DEGRADATION QUANT: CPT

## 2021-09-08 PROCEDURE — 84484 ASSAY OF TROPONIN QUANT: CPT

## 2021-09-08 PROCEDURE — 99232 SBSQ HOSP IP/OBS MODERATE 35: CPT

## 2021-09-08 PROCEDURE — 97161 PT EVAL LOW COMPLEX 20 MIN: CPT

## 2021-09-08 PROCEDURE — 93005 ELECTROCARDIOGRAM TRACING: CPT

## 2021-09-08 PROCEDURE — 80053 COMPREHEN METABOLIC PANEL: CPT

## 2021-09-08 PROCEDURE — 70450 CT HEAD/BRAIN W/O DYE: CPT

## 2021-09-08 PROCEDURE — 99285 EMERGENCY DEPT VISIT HI MDM: CPT

## 2021-09-08 PROCEDURE — 94640 AIRWAY INHALATION TREATMENT: CPT

## 2021-09-08 PROCEDURE — 81001 URINALYSIS AUTO W/SCOPE: CPT

## 2021-09-08 PROCEDURE — 84100 ASSAY OF PHOSPHORUS: CPT

## 2021-09-08 PROCEDURE — 83735 ASSAY OF MAGNESIUM: CPT

## 2021-09-08 PROCEDURE — 87086 URINE CULTURE/COLONY COUNT: CPT

## 2021-09-08 PROCEDURE — 83880 ASSAY OF NATRIURETIC PEPTIDE: CPT

## 2021-09-08 PROCEDURE — 96360 HYDRATION IV INFUSION INIT: CPT

## 2021-09-08 PROCEDURE — 87635 SARS-COV-2 COVID-19 AMP PRB: CPT

## 2021-09-08 PROCEDURE — 86769 SARS-COV-2 COVID-19 ANTIBODY: CPT

## 2021-09-08 PROCEDURE — 97110 THERAPEUTIC EXERCISES: CPT

## 2021-09-08 PROCEDURE — 36415 COLL VENOUS BLD VENIPUNCTURE: CPT

## 2021-09-08 PROCEDURE — 84443 ASSAY THYROID STIM HORMONE: CPT

## 2021-09-08 PROCEDURE — 93970 EXTREMITY STUDY: CPT

## 2021-09-08 PROCEDURE — 97116 GAIT TRAINING THERAPY: CPT

## 2021-09-08 PROCEDURE — 86140 C-REACTIVE PROTEIN: CPT

## 2021-09-08 PROCEDURE — 71250 CT THORAX DX C-: CPT

## 2021-09-08 PROCEDURE — 74176 CT ABD & PELVIS W/O CONTRAST: CPT

## 2021-09-08 PROCEDURE — 83605 ASSAY OF LACTIC ACID: CPT

## 2021-09-08 PROCEDURE — 97530 THERAPEUTIC ACTIVITIES: CPT

## 2021-09-08 PROCEDURE — 80048 BASIC METABOLIC PNL TOTAL CA: CPT

## 2021-09-08 PROCEDURE — 76770 US EXAM ABDO BACK WALL COMP: CPT

## 2021-09-08 PROCEDURE — 84134 ASSAY OF PREALBUMIN: CPT

## 2021-09-08 PROCEDURE — 85652 RBC SED RATE AUTOMATED: CPT

## 2021-09-08 PROCEDURE — 71045 X-RAY EXAM CHEST 1 VIEW: CPT

## 2021-09-08 PROCEDURE — 76775 US EXAM ABDO BACK WALL LIM: CPT

## 2021-09-08 PROCEDURE — 93925 LOWER EXTREMITY STUDY: CPT

## 2021-09-08 PROCEDURE — U0005: CPT

## 2021-09-08 PROCEDURE — 93306 TTE W/DOPPLER COMPLETE: CPT

## 2021-09-08 PROCEDURE — 85027 COMPLETE CBC AUTOMATED: CPT

## 2021-09-08 RX ORDER — LOPERAMIDE HCL 2 MG
2 TABLET ORAL EVERY 4 HOURS
Refills: 0 | Status: DISCONTINUED | OUTPATIENT
Start: 2021-09-08 | End: 2021-09-08

## 2021-09-08 RX ORDER — LACTOBACILLUS ACIDOPHILUS 100MM CELL
1 CAPSULE ORAL DAILY
Refills: 0 | Status: DISCONTINUED | OUTPATIENT
Start: 2021-09-08 | End: 2021-09-08

## 2021-09-08 RX ORDER — METOPROLOL TARTRATE 50 MG
1 TABLET ORAL
Qty: 0 | Refills: 0 | DISCHARGE
Start: 2021-09-08

## 2021-09-08 RX ORDER — LACTOBACILLUS ACIDOPHILUS 100MM CELL
2 CAPSULE ORAL
Qty: 0 | Refills: 0 | DISCHARGE
Start: 2021-09-08

## 2021-09-08 RX ORDER — LACTOBACILLUS ACIDOPHILUS 100MM CELL
1 CAPSULE ORAL
Qty: 0 | Refills: 0 | DISCHARGE
Start: 2021-09-08

## 2021-09-08 RX ORDER — NYSTATIN CREAM 100000 [USP'U]/G
1 CREAM TOPICAL
Qty: 0 | Refills: 0 | DISCHARGE
Start: 2021-09-08

## 2021-09-08 RX ORDER — LOPERAMIDE HCL 2 MG
1 TABLET ORAL
Qty: 0 | Refills: 0 | DISCHARGE
Start: 2021-09-08

## 2021-09-08 RX ORDER — TAMSULOSIN HYDROCHLORIDE 0.4 MG/1
1 CAPSULE ORAL
Qty: 0 | Refills: 0 | DISCHARGE
Start: 2021-09-08

## 2021-09-08 RX ORDER — APIXABAN 2.5 MG/1
1 TABLET, FILM COATED ORAL
Qty: 0 | Refills: 0 | DISCHARGE
Start: 2021-09-08

## 2021-09-08 RX ORDER — ACETAMINOPHEN 500 MG
2 TABLET ORAL
Qty: 0 | Refills: 0 | DISCHARGE
Start: 2021-09-08

## 2021-09-08 RX ADMIN — BUDESONIDE AND FORMOTEROL FUMARATE DIHYDRATE 2 PUFF(S): 160; 4.5 AEROSOL RESPIRATORY (INHALATION) at 06:53

## 2021-09-08 RX ADMIN — Medication 25 MILLIGRAM(S): at 17:36

## 2021-09-08 RX ADMIN — NYSTATIN CREAM 1 APPLICATION(S): 100000 CREAM TOPICAL at 06:53

## 2021-09-08 RX ADMIN — APIXABAN 5 MILLIGRAM(S): 2.5 TABLET, FILM COATED ORAL at 17:36

## 2021-09-08 RX ADMIN — BUDESONIDE AND FORMOTEROL FUMARATE DIHYDRATE 2 PUFF(S): 160; 4.5 AEROSOL RESPIRATORY (INHALATION) at 17:38

## 2021-09-08 RX ADMIN — TIOTROPIUM BROMIDE 1 CAPSULE(S): 18 CAPSULE ORAL; RESPIRATORY (INHALATION) at 06:52

## 2021-09-08 RX ADMIN — Medication 25 MILLIGRAM(S): at 06:52

## 2021-09-08 RX ADMIN — APIXABAN 5 MILLIGRAM(S): 2.5 TABLET, FILM COATED ORAL at 06:52

## 2021-09-08 RX ADMIN — NYSTATIN CREAM 1 APPLICATION(S): 100000 CREAM TOPICAL at 17:37

## 2021-09-08 NOTE — PROGRESS NOTE ADULT - ASSESSMENT
80 year old male with a history of HTN, HL, COPD, hemochromatosis who presents with syncope and rapid atrial fibrillation.     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

## 2021-09-08 NOTE — PROGRESS NOTE ADULT - SUBJECTIVE AND OBJECTIVE BOX
SUBJECTIVE:    No acute events overnight, afebrile, hds.    VITAL SIGNS:    Vital Signs Last 24 Hrs  T(C): 36.4 (08 Sep 2021 09:36), Max: 37.6 (07 Sep 2021 18:38)  T(F): 97.6 (08 Sep 2021 09:36), Max: 99.6 (07 Sep 2021 18:38)  HR: 89 (08 Sep 2021 09:36) (59 - 89)  BP: 115/77 (08 Sep 2021 09:36) (115/77 - 147/86)  BP(mean): --  RR: 17 (08 Sep 2021 09:36) (16 - 18)  SpO2: 99% (08 Sep 2021 09:36) (95% - 100%)    PHYSICAL EXAM:     GENERAL: NAD  HEENT: eomi, MMM  RESPIRATORY: CTAB  CARDIOVASCULAR: s1 and s2, rrr  ABDOMINAL: soft, nd, nt, +bs  EXTREMITIES: no c/c/ pedal edema  NEUROLOGICAL: alert and oriented x 3, non-focal  SKIN: no new rashes or lesions   MUSCULOSKELETAL: no gross joint deformity                 12.2   9.22  )-----------( 213      ( 08 Sep 2021 09:03 )             36.1     09-08    139  |  106  |  19  ----------------------------<  167<H>  3.6   |  26  |  1.07    Ca    7.4<L>      08 Sep 2021 09:03        CAPILLARY BLOOD GLUCOSE          MEDICATIONS  (STANDING):  apixaban 5 milliGRAM(s) Oral two times a day  atorvastatin 10 milliGRAM(s) Oral at bedtime  budesonide 160 MICROgram(s)/formoterol 4.5 MICROgram(s) Inhaler 2 Puff(s) Inhalation two times a day  influenza   Vaccine 0.5 milliLiter(s) IntraMuscular once  lactobacillus acidophilus 1 Tablet(s) Oral daily  metoprolol tartrate 25 milliGRAM(s) Oral two times a day  nystatin Powder 1 Application(s) Topical two times a day  tamsulosin 0.4 milliGRAM(s) Oral at bedtime  tiotropium 18 MICROgram(s) Capsule 1 Capsule(s) Inhalation daily

## 2021-09-08 NOTE — DISCHARGE NOTE PROVIDER - NSDCCPCAREPLAN_GEN_ALL_CORE_FT
PRINCIPAL DISCHARGE DIAGNOSIS  Diagnosis: New onset atrial fibrillation  Assessment and Plan of Treatment: Cardiology consulted, started on eliquis and metoprolol.  Monitored on telemetry.  Follow up with Dr. Gore after discharge.      SECONDARY DISCHARGE DIAGNOSES  Diagnosis: Urinary retention  Assessment and Plan of Treatment: Found to have kidney stones, will need outpt follwo up with Dr. Qiu.  Starte don flomax,failed trial of void, had iglesias cathter placed back in.

## 2021-09-08 NOTE — DISCHARGE NOTE PROVIDER - PROVIDER TOKENS
PROVIDER:[TOKEN:[[id number]]] PROVIDER:[TOKEN:[[id number]],FOLLOWUP:[2 weeks]],PROVIDER:[TOKEN:[[id number]],FOLLOWUP:[2 weeks]]

## 2021-09-08 NOTE — PROGRESS NOTE ADULT - PROVIDER SPECIALTY LIST ADULT
Cardiology
Nephrology
Cardiology
Hospitalist
Pulmonology
Pulmonology
Cardiology
Hospitalist
Nephrology
Pulmonology
Hospitalist

## 2021-09-08 NOTE — PROGRESS NOTE ADULT - SUBJECTIVE AND OBJECTIVE BOX
Date/Time Patient Seen:  		  Referring MD:   Data Reviewed	       Patient is a 80y old  Male who presents with a chief complaint of Syncope and SOB (07 Sep 2021 15:11)      Subjective/HPI     PAST MEDICAL & SURGICAL HISTORY:  No pertinent past medical history    Gout    Cellulitis  Right lower leg    COPD exacerbation    HTN (hypertension)    H/O ulcerative colitis    Hyperlipidemia    History of hemochromatosis    History of hemochromatosis          Medication list         MEDICATIONS  (STANDING):  apixaban 5 milliGRAM(s) Oral two times a day  atorvastatin 10 milliGRAM(s) Oral at bedtime  budesonide 160 MICROgram(s)/formoterol 4.5 MICROgram(s) Inhaler 2 Puff(s) Inhalation two times a day  influenza   Vaccine 0.5 milliLiter(s) IntraMuscular once  metoprolol tartrate 25 milliGRAM(s) Oral two times a day  nystatin Powder 1 Application(s) Topical two times a day  tamsulosin 0.4 milliGRAM(s) Oral at bedtime  tiotropium 18 MICROgram(s) Capsule 1 Capsule(s) Inhalation daily    MEDICATIONS  (PRN):  acetaminophen   Tablet .. 650 milliGRAM(s) Oral every 6 hours PRN Temp greater or equal to 38C (100.4F), Mild Pain (1 - 3)         Vitals log        ICU Vital Signs Last 24 Hrs  T(C): 36.8 (08 Sep 2021 05:01), Max: 37.6 (07 Sep 2021 18:38)  T(F): 98.2 (08 Sep 2021 05:01), Max: 99.6 (07 Sep 2021 18:38)  HR: 65 (08 Sep 2021 05:01) (59 - 87)  BP: 128/85 (08 Sep 2021 05:01) (115/77 - 147/86)  BP(mean): --  ABP: --  ABP(mean): --  RR: 16 (08 Sep 2021 05:01) (16 - 18)  SpO2: 95% (08 Sep 2021 05:01) (95% - 100%)           Input and Output:  I&O's Detail    06 Sep 2021 07:01  -  07 Sep 2021 07:00  --------------------------------------------------------  IN:  Total IN: 0 mL    OUT:    Indwelling Catheter - Urethral (mL): 700 mL    Intermittent Catheterization - Urethral (mL): 520 mL  Total OUT: 1220 mL    Total NET: -1220 mL      07 Sep 2021 07:01  -  08 Sep 2021 06:52  --------------------------------------------------------  IN:    Oral Fluid: 20 mL  Total IN: 20 mL    OUT:    Indwelling Catheter - Urethral (mL): 1100 mL    Intermittent Catheterization - Urethral (mL): 900 mL  Total OUT: 2000 mL    Total NET: -1980 mL          Lab Data                        10.9   6.83  )-----------( 182      ( 07 Sep 2021 07:49 )             32.5     09-07    143  |  110<H>  |  26<H>  ----------------------------<  90  3.2<L>   |  26  |  1.08    Ca    7.3<L>      07 Sep 2021 07:49              Review of Systems	      Objective     Physical Examination    heart s1s2  lung dec BS  abd soft      Pertinent Lab findings & Imaging      Trae:  NO   Adequate UO     I&O's Detail    06 Sep 2021 07:01  -  07 Sep 2021 07:00  --------------------------------------------------------  IN:  Total IN: 0 mL    OUT:    Indwelling Catheter - Urethral (mL): 700 mL    Intermittent Catheterization - Urethral (mL): 520 mL  Total OUT: 1220 mL    Total NET: -1220 mL      07 Sep 2021 07:01  -  08 Sep 2021 06:52  --------------------------------------------------------  IN:    Oral Fluid: 20 mL  Total IN: 20 mL    OUT:    Indwelling Catheter - Urethral (mL): 1100 mL    Intermittent Catheterization - Urethral (mL): 900 mL  Total OUT: 2000 mL    Total NET: -1980 mL               Discussed with:     Cultures:	        Radiology

## 2021-09-08 NOTE — PROGRESS NOTE ADULT - ASSESSMENT
The patient is an 80 year old male with a history of HTN, HL, COPD, hemochromatosis who presents with syncope and rapid atrial fibrillation.    Plan:  - Echo with normal LV systolic function, mild pulm HTN  - BP and HR improved  - Continue metoprolol tartrate 25 mg bid  - Hold diltiazem  - Hold lisinopril  - Hold furosemide - resume when ok from a renal standpoint  - D-dimer significantly elevated - possible PE although cannot get CTA chest due to ROBERT  - Renal function improving with IV fluids  - Given possible VTE and ROBERT (although improving) with new AF, continue apixaban at 5 mg bid  - Urology follow-up noted - possible intervention on kidney stone after rehab - agree with waiting as possible acute PE and need for continued anticoagulation for now

## 2021-09-08 NOTE — CONSULT NOTE ADULT - ASSESSMENT
80M HTN, HLD, COPD, Hemachromatosis who was in Rehab after being admitted in [facility] from 8/9 thru 8/23 for Crystal Arthropathy and RLE Cellulitis treated with Antibiotics and Steroids. Patient was reported to have syncope, where patient does not recall the details of, but does state that for the past 2 days has been not feeling well and increasing SOB.  No chest pain or palpitations. No Fevers or chills. No nausea or vomiting.  No urinary symptoms, CT w obstructing calculus/calculi in the distal left ureter    RECOMMENDATIONS    Noted leukocytosis and abn UA but no concerning symptoms and no growth in urine so agree with observation off abx. No sig suspicion for UTI in this clinical context.    Thank you for consulting us and involving us in the management of this most interesting and challenging case.  Please call us at [phone] or text me directly on my cell#[phone] with any concerns or further questions.

## 2021-09-08 NOTE — DISCHARGE NOTE PROVIDER - CARE PROVIDER_API CALL
Aquiles Qiu)  Urology  [address], [address]  [place] [address]  Phone: [phone]  Fax: [phone]  Follow Up Time:    Aquiles Qiu)  Urology  [address], [address]  [place] [address]  Phone: [phone]  Fax: [phone]  Follow Up Time: 2 weeks    David Gore)  Cardiovascular Disease; Internal Medicine  [address], [address]  [place] [address]  Phone: [phone]  Fax: [phone]  Follow Up Time: 2 weeks

## 2021-09-08 NOTE — DISCHARGE NOTE NURSING/CASE MANAGEMENT/SOCIAL WORK - PATIENT PORTAL LINK FT
You can access the FollowMyHealth Patient Portal offered by [facility] by registering at the following website: [email or web address]. By joining [employer]’s FollowMyHealth portal, you will also be able to view your health information using other applications (apps) compatible with our system.

## 2021-09-08 NOTE — PROGRESS NOTE ADULT - ASSESSMENT
80M HTN, HLD, COPD, Hemachromatosis who was in Rehab after being admitted in [facility] from 8/9 thru 8/23 for Crystal Arthropathy and RLE Cellulitis treated with Antibiotics and Steroids. Patient today was reported to have syncopized, where patient does not recall the details of, but does state that for the past 2 days has been not feeling well and increasing SOB.  No chest pain or palpitations. No Fevers or chills. No nausea or vomiting.      In the ED routine labs and imaging done.  Was found to be in Atrial Fibrillation with RVR, Acute Renal Failure, and elevated D. Dimer.  EKG revealed Atrial Fibrillation with 's. Given IVF and Metoprolol initially.  Patient also hypoxic and placed on 3L NC. Then was started on Digoxin and admitted for further management.  (03 Sep 2021 13:57)       Problem/Plan - 1:  ·  Problem: New onset atrial fibrillation.   ·  Plan: Patient with new onset atrial fibrillation with RVR, POA.    Echo with normal LV systolic function, mild pulm HTN   BP and HR improved   Continue metoprolol tartrate 25mg bid for rate control. If HR trends up, resume diltiazem   Continue to HOLD Lisinopril&Furosemide in view of ROBERT   Continue Eliquis 5mg bid for anticoagulation.    Care d/w Dr. Gore, who feels pt is stable for discharge and recommends continuing current regiment   D-dimer significantly elevated - likely a combination of factors ( age - inflammatory state - ROBERT - hemachromatosis ) -would not pursue VQ scan          Problem/Plan - 2:  ·  Problem: Urinary Obstruction with  ROBERT (acute kidney injury), L renal lesion, hematuria, hydronephrosis, multiple renal stones, prostamegaly  ·  Plan: Care d/w Dr. Qiu in urology, pt found to have suggestion of multiple stones causing some obstruction, plan is for possible lithoripsy or stent placement, or other urologic procedure, but to do as outpt, when pt more stabilized, given he was just started on eliquis with new onset A fib  - ROBERT resolving; pt failed TOV, and PVR elevated, iglesias placed back by urology  - CT shows prostamegaly and as per d/w daughter, who states pt has nocturia and hesitancy; and as per d/w Dr. Qiu/uro, pt started on flomax       Problem/Plan - 3:  ·  Problem: Hypertension.   ·  Plan: Continue Metoprolol.   Lisinopril, Furosemide can be resumed on discharge, as ROBERT resolved, currently BP low normal  Monitor blood pressure per routine.     Problem/Plan - 4:  ·  Problem: Hyperlipidemia.   ·  Plan: Continue Lipitor.     Problem/Plan - 5:  ·  Problem: COPD (chronic obstructive pulmonary disease).   ·  Plan: Continue Symbicort and Spiriva.   Oxygen supplementation as needed.  PFT as outpt     Problem/Plan - 6:  ·  Problem: Prophylactic measure.   ·  Plan: On Eliquis 5mg bid for anticoagulation.      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 Patient today was reported to have syncopized, where patient does not recall the details of, but does state that for the past 2 days has been not feeling well and increasing SOB.  No chest pain or palpitations. No Fevers or chills. No nausea or vomiting.      In the ED routine labs and imaging done.  Was found to be in Atrial Fibrillation with RVR, Acute Renal Failure, and elevated D. Dimer.  EKG revealed Atrial Fibrillation with 's. Given IVF and Metoprolol initially.  Patient also hypoxic and placed on 3L NC. Then was started on Digoxin and admitted for further management.  (03 Sep 2021 13:57)       Problem/Plan - 1:  ·  Problem: New onset atrial fibrillation.   ·  Plan: Patient with new onset atrial fibrillation with RVR, POA.    Echo with normal LV systolic function, mild pulm HTN   BP and HR improved   Continue metoprolol tartrate 25mg bid for rate control. If HR trends up, resume diltiazem   Continue to HOLD Lisinopril&Furosemide in view of ROBERT   Continue Eliquis 5mg bid for anticoagulation.    Care d/w Dr. Gore, who feels pt is stable for discharge and recommends continuing current regiment   D-dimer significantly elevated - likely a combination of factors ( age - inflammatory state - ROBERT - hemachromatosis ) -would not pursue VQ scan   - PT recommending rehab, care d/w  and daughter, awaiting bed       Problem/Plan - 2:  ·  Problem: Urinary Obstruction with  ROBERT (acute kidney injury), L renal lesion, hematuria, hydronephrosis, multiple renal stones, prostamegaly  ·  Plan: Care d/w Dr. Qiu in urology, pt found to have suggestion of multiple stones causing some obstruction, plan is for possible lithoripsy or stent placement, or other urologic procedure, but to do as outpt, when pt more stabilized, given he was just started on eliquis with new onset A fib  - ROBERT resolving; pt failed TOV, and PVR elevated, iglesias placed back by urology  - CT shows prostamegaly and as per d/w daughter, who states pt has nocturia and hesitancy; and as per d/w Dr. Qiu/uro, pt started on flomax       Problem/Plan - 3:  ·  Problem: Hypertension.   ·  Plan: Continue Metoprolol.   Lisinopril, Furosemide can be resumed on discharge, as ROBERT resolved, currently BP low normal  Monitor blood pressure per routine.     Problem/Plan - 4:  ·  Problem: Hyperlipidemia.   ·  Plan: Continue Lipitor.     Problem/Plan - 5:  ·  Problem: COPD (chronic obstructive pulmonary disease).   ·  Plan: Continue Symbicort and Spiriva.   Oxygen supplementation as needed.  PFT as outpt     Problem/Plan - 6:  ·  Problem: Prophylactic measure.   ·  Plan: On Eliquis 5mg bid for anticoagulation.      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

## 2021-09-08 NOTE — PROGRESS NOTE ADULT - REASON FOR ADMISSION
Syncope and SOB

## 2021-09-08 NOTE — CONSULT NOTE ADULT - CONSULT REASON
syncope  sob  louise  weakness  diarrhea  dec PO intake
ROBERT, hyperK
Rapid atrial fibrillation
retention vs UTI
urinary retention

## 2021-09-08 NOTE — CONSULT NOTE ADULT - SUBJECTIVE AND OBJECTIVE BOX
Date/Time Patient Seen:  		  Referring MD:   Data Reviewed	       Patient is a 80y old  Male who presents with a chief complaint of Syncope and SOB (03 Sep 2021 13:57)      Subjective/HPI    in bed  seen and examined  dtr at bedside  RN at bedside  vs noted  labs reviewed  from [facility]  lives alone    ex smoker  2 children  non drinker  retired    History of Present Illness:   80M HTN, HLD, COPD, Hemachromatosis who was in Rehab after being admitted in [facility] from 8/9 thru 8/23 for Crystal Arthropathy and RLE Cellulitis treated with Antibiotics and Steroids. Patient today was reported to have syncopized, where patient does not recall the details of, but does state that for the past 2 days has been not feeling well and increasing SOB.  No chest pain or palpitations. No Fevers or chills. No nausea or vomiting.      In the ED routine labs and imaging done.  Was found to be in Atrial Fibrillation with RVR, Acute Renal Failure, and elevated D. Dimer.  EKG revealed Atrial Fibrillation with 's. GIven IVF and Metoprolol initially.  Patient also hypoxic and placed on 3L NC. Then was started on Digoxin and admitted for further management.      Social History:  Social History (marital status, living situation, occupation, tobacco use, alcohol and drug use, and sexual history): Negative for Tobacco, EtOH and IVDA     Tobacco Screening:  · Core Measure Site	No  · Has the patient used tobacco in the past 30 days?	No    Risk Assessment:    Present on Admission:  Deep Venous Thrombosis	no  Pulmonary Embolus	no  Urinary Catheter	no     Heart Failure:  Does this patient have a history of or has been diagnosed with heart failure? no.         PAST MEDICAL & SURGICAL HISTORY:  No pertinent past medical history    Gout    Cellulitis  Right lower leg    COPD exacerbation    HTN (hypertension)    H/O ulcerative colitis    Hyperlipidemia    History of hemochromatosis    History of hemochromatosis          Medication list         MEDICATIONS  (STANDING):  atorvastatin 10 milliGRAM(s) Oral at bedtime  budesonide 160 MICROgram(s)/formoterol 4.5 MICROgram(s) Inhaler 2 Puff(s) Inhalation two times a day  metoprolol tartrate 25 milliGRAM(s) Oral two times a day  sodium chloride 0.9%. 1000 milliLiter(s) (100 mL/Hr) IV Continuous <Continuous>  tiotropium 18 MICROgram(s) Capsule 1 Capsule(s) Inhalation daily    MEDICATIONS  (PRN):  acetaminophen   Tablet .. 650 milliGRAM(s) Oral every 6 hours PRN Temp greater or equal to 38C (100.4F), Mild Pain (1 - 3)         Vitals log        ICU Vital Signs Last 24 Hrs  T(C): 36.7 (03 Sep 2021 15:59), Max: 37.1 (03 Sep 2021 11:30)  T(F): 98 (03 Sep 2021 15:59), Max: 98.7 (03 Sep 2021 11:30)  HR: 89 (03 Sep 2021 15:59) (89 - 128)  BP: 136/82 (03 Sep 2021 15:59) (114/75 - 136/82)  BP(mean): --  ABP: --  ABP(mean): --  RR: 18 (03 Sep 2021 15:59) (18 - 24)  SpO2: 95% (03 Sep 2021 15:59) (87% - 97%)           Input and Output:  I&O's Detail      Lab Data                        13.7   15.60 )-----------( 170      ( 03 Sep 2021 12:07 )             42.1     09-03    143  |  110<H>  |  79<H>  ----------------------------<  142<H>  5.6<H>   |  20<L>  |  2.54<H>    Ca    8.4      03 Sep 2021 12:07  Mg     1.7     09-03    TPro  6.1  /  Alb  2.5<L>  /  TBili  0.8  /  DBili  x   /  AST  32  /  ALT  76<H>  /  AlkPhos  147<H>  09-03      CARDIAC MARKERS ( 03 Sep 2021 12:07 )  .000 ng/mL / x     / 46 U/L / x     / x            Review of Systems	  weakness  diarrhea  dec PO intake      Objective     Physical Examination    cold extremities  heart s1s2  lung dec BS  abd soft  head nc  verbal  alert  cachectic      Pertinent Lab findings & Imaging      Larkin:  NO   Adequate UO     I&O's Detail           Discussed with:     Cultures:	        Radiology        EXAM:  CT BRAIN                                  PROCEDURE DATE:  09/03/2021          INTERPRETATION:  CLINICAL STATEMENT: Pain.    TECHNIQUE: CT of the head was performed without IV contrast.  RAPID artificial intelligence was utilized for the preliminary evaluation of intracranial hemorrhage.    COMPARISON: None.    FINDINGS:  There is mild diffuse parenchymal volume loss. There are areas of low attenuation in the periventricular white matter likely related to mild chronic microvascular ischemic changes.    There is no acute intracranial hemorrhage, parenchymal mass, mass effect or midline shift. There is no acute territorial infarct. There is no hydrocephalus.    The cranium is intact. Hypoplastic left maxillary sinus. Mucosal thickening paranasal sinuses.    IMPRESSION:  No acute intracranial hemorrhage or acute territorial infarct.  If symptoms persist, follow-up MRI exam recommended.    --- End of Report ---              VERA MCKEON MD; Attending Radiologist  This document has been electronically signed. Sep  3 2021  2:53PM          EXAM:  XR CHEST PORTABLE URGENT 1V                                  PROCEDURE DATE:  09/03/2021          INTERPRETATION:  AP semierect chest on September 3, 2021 at 11:50 AM. Patient has syncopal episode and has atrial fibrillation.    COMPARISON: None available.    Shallow inspiration crowds the chest. Heart magnified by technique.    There are some rather mild basilar atelectatic findings.    IMPRESSION: Mild bibasilar atelectases.    --- End of Report ---              DEYVI SWAN MD; Attending Radiologist  This document has been electronically signed. Sep  3 2021 12:07PM          EXAM:  CT ABDOMEN AND PELVIS IC                            PROCEDURE DATE:  08/13/2021          INTERPRETATION:  CLINICAL INFORMATION: Chronic Diarrhea Admitting Dxs: L03.115 CELLULITIS OF RIGHT LOWER LIMB Chronic diarrhea R/O Mass/infection    COMPARISON: None.    CONTRAST/COMPLICATIONS:  IV Contrast: Omnipaque 350  90 cc administered   10 cc discarded  Oral Contrast: NONE  Complications: None reported at time of study completion    PROCEDURE:  CT of the Abdomen and Pelvis was performed.  Sagittal and coronal reformats were performed.    FINDINGS:    LOWER CHEST: Bibasilar subsegmental atelectasis.    LIVER: Within normal limits.  BILE DUCTS: Normal caliber.  GALLBLADDER: Cholelithiasis.  SPLEEN: Within normal limits.  PANCREAS: Within normal limits.  ADRENALS: Within normal limits.  KIDNEYS/URETERS: Hypodense lesions too small to characterize.  Calculi in the left renal pelvis measuring up to 1.3 cm without hydronephrosis. Additional left intrarenal calculi measuring up to 1.3 cm. There is no significant hydronephrosis.    BLADDER: Bladder calculi measuring 4 and 2 cm respectively.  REPRODUCTIVE ORGANS: The prostate is enlarged.    BOWEL: Distended colon containing stool and fluid.  PERITONEUM: No ascites.  VESSELS:  Narrowing of the proximal celiac artery is noted.  RETROPERITONEUM/LYMPH NODES: No lymphadenopathy.  ABDOMINAL WALL: Moderate right and one hernia containing small bowel.  BONES: Within normal limits.    IMPRESSION: Distended colon containing stool and fluid.    Left-sided renal calculi as above.    Multiple calculi in the bladder measuring up to 4 cm.        
[facility] DIVISION of INFECTIOUS DISEASE  Marcial Florez MD PhD, Zeinab Krause MD, Rosetta Ladd MD, Alcon Wilson MD  and providing coverage with Taryn Olivo MD and Rosales An MD  Providing Infectious Disease Consultations at [facility], [facility], [facility]    Office# [phone] to schedule follow up appointments  Answering Service for urgent calls or New Consults [phone]  Cell# to text for urgent issues Marcial Gautam [phone]     HPI:  80M HTN, HLD, COPD, Hemachromatosis who was in Rehab after being admitted in [facility] from 8/9 thru 8/23 for Crystal Arthropathy and RLE Cellulitis treated with Antibiotics and Steroids. Patient was reported to have syncope, where patient does not recall the details of, but does state that for the past 2 days has been not feeling well and increasing SOB.  No chest pain or palpitations. No Fevers or chills. No nausea or vomiting.      In the ED routine labs and imaging done.  Was found to be in Atrial Fibrillation with RVR, Acute Renal Failure, and elevated D. Dimer.  EKG revealed Atrial Fibrillation with 's. GIven IVF and Metoprolol initially.  Patient also hypoxic and placed on 3L NC. Then was started on Digoxin and admitted for further management.  (03 Sep 2021 13:57)      PAST MEDICAL & SURGICAL HISTORY:  Gout  Cellulitis  Right lower leg  COPD exacerbation  HTN (hypertension)  H/O ulcerative colitis  Hyperlipidemia  History of hemochromatosis    Antimicrobials      Immunological  influenza   Vaccine 0.5 milliLiter(s) IntraMuscular once      Other  acetaminophen   Tablet .. 650 milliGRAM(s) Oral every 6 hours PRN  apixaban 5 milliGRAM(s) Oral two times a day  atorvastatin 10 milliGRAM(s) Oral at bedtime  budesonide 160 MICROgram(s)/formoterol 4.5 MICROgram(s) Inhaler 2 Puff(s) Inhalation two times a day  metoprolol tartrate 25 milliGRAM(s) Oral two times a day  nystatin Powder 1 Application(s) Topical two times a day  tamsulosin 0.4 milliGRAM(s) Oral at bedtime  tiotropium 18 MICROgram(s) Capsule 1 Capsule(s) Inhalation daily      Allergies    No Known Allergies    Intolerances        SOCIAL HISTORY:  Negative for Tobacco, EtOH and IVDA (03 Sep 2021 13:57)      FAMILY HISTORY:      ROS:    EYES:  Negative  blurry vision or double vision  GASTROINTESTINAL:  Negative for nausea, vomiting, diarrhea  -otherwise negative except for subjective    Vital Signs Last 24 Hrs  T(C): 36.8 (08 Sep 2021 05:01), Max: 37.6 (07 Sep 2021 18:38)  T(F): 98.2 (08 Sep 2021 05:01), Max: 99.6 (07 Sep 2021 18:38)  HR: 65 (08 Sep 2021 05:01) (59 - 87)  BP: 128/85 (08 Sep 2021 05:01) (124/76 - 147/86)  BP(mean): --  RR: 16 (08 Sep 2021 05:01) (16 - 18)  SpO2: 95% (08 Sep 2021 05:01) (95% - 100%)    PE:  WDWN in no distress  HEENT:  NC, PERRL, sclerae anicteric, conjunctivae clear, EOMI.  Sinuses nontender, no nasal exudate.  No buccal or pharyngeal lesions, erythema or exudate  Neck:  Supple, no adenopathy  Lungs:  No accessory muscle use, bilaterally clear to auscultation  Cor:  distant  Abd:  Symmetric, normoactive BS.  Soft, nontender, no masses, guarding or rebound.  Liver and spleen not enlarged  Extrem:  No cyanosis or edema  Skin:  No rashes.  Neuro: grossly intact  Musc: moving all limbs freely, no focal deficits        LABS:                        12.2   9.22  )-----------( 213      ( 08 Sep 2021 09:03 )             36.1       WBC Count: 9.22 K/uL (09-08-21 @ 09:03)  WBC Count: 6.83 K/uL (09-07-21 @ 07:49)  WBC Count: 7.66 K/uL (09-06-21 @ 07:22)  WBC Count: 8.97 K/uL (09-05-21 @ 07:32)  WBC Count: 11.74 K/uL (09-04-21 @ 08:03)  WBC Count: 15.60 K/uL (09-03-21 @ 12:07)      09-08    139  |  106  |  19  ----------------------------<  167<H>  3.6   |  26  |  1.07    Ca    7.4<L>      08 Sep 2021 09:03        Creatinine, Serum: 1.07 mg/dL (09-08-21 @ 09:03)  Creatinine, Serum: 1.08 mg/dL (09-07-21 @ 07:49)  Creatinine, Serum: 1.19 mg/dL (09-06-21 @ 07:22)  Creatinine, Serum: 1.38 mg/dL (09-05-21 @ 07:32)  Creatinine, Serum: 2.08 mg/dL (09-04-21 @ 08:03)  Creatinine, Serum: 2.54 mg/dL (09-03-21 @ 12:07)          Urine Microscopic-Add On (NC) (09.04.21 @ 12:29)    Red Blood Cell - Urine: >50 /HPF    White Blood Cell - Urine: 3-5    Bacteria: Moderate    Epithelial Cells: Many    Urinalysis (09.04.21 @ 12:29)    Glucose Qualitative, Urine: Negative mg/dL    Blood, Urine: Large    pH Urine: 6.5    Color: Brown    Urine Appearance: Turbid    Bilirubin: Small    Ketone - Urine: Trace    Specific Gravity: 1.015    Protein, Urine: 100 mg/dL    Urobilinogen: 1 mg/dL    Nitrite: Positive    Leukocyte Esterase Concentration: Moderate          MICROBIOLOGY:    Culture - Urine (09.04.21 @ 21:53)    Specimen Source: Catheterized Catheterized    Culture Results:   No growth        RADIOLOGY & ADDITIONAL STUDIES:    --< from: US Kidney and Bladder (09.07.21 @ 16:55) >  EXAM:  US KIDNEYS AND BLADDER                                  PROCEDURE DATE:  09/07/2021          INTERPRETATION:  CLINICAL INFORMATION: Hydronephrosis and calculus of kidney, calculus of ureter    COMPARISON: Ultrasound kidneys and CT 9/4/2021    TECHNIQUE: Sonography of the kidneys and bladder.    FINDINGS:    Right kidney: 11.3 cm. No renal mass, hydronephrosis or calculi.    Left kidney: 11.9 cm. No renal mass, increasing moderate hydronephrosis, stable 1.7 cm lower pole calculi.    Urinary bladder: Larkin in the bladder    IMPRESSION:    Increasing moderate left hydronephrosis. No right hydronephrosis.      < from: CT Abdomen and Pelvis No Cont (09.04.21 @ 09:11) >    EXAM:  CT ABDOMEN AND PELVIS                          EXAM:  CT CHEST                                  PROCEDURE DATE:  09/04/2021          INTERPRETATION:  CLINICAL INFORMATION: Admitted with syncope and new onset atrial fibrillation. Shortness ofbreath, ROBERT, weight loss.    COMPARISON: None.    CONTRAST/COMPLICATIONS:  IV Contrast: NONE  Oral Contrast: NONE  Complications: None reported at time of study completion    PROCEDURE:  CT of the Chest, Abdomen and Pelvis was performed.  Sagittal and coronal reformats were performed.    FINDINGS:    CHEST:  LUNGS AND LARGE AIRWAYS: Patent central airways. Upper lung predominant mild to moderate changes of centrilobular and paraseptal emphysema. Bands of atelectasis and/or scarring within the left lower, right lower, and right middle lobes with marked volume loss of the right middle lobe. Scattered pulmonary nodules within the left lower lobe measuring up to 5 mm. For example there is a 5 mm nodule visualized on series 3 image 96, and a 3 mm nodule on series 3 image 116.  PLEURA: No pleural effusion or pneumothorax.  VESSELS: Atherosclerotic changes. Tortuous thoracic aorta with ectasia of the ascending aorta, measures 4.2 cm in diameter. Coronary artery calcifications.  HEART: Heart sizeis normal. No pericardial effusion. Aortic valve calcifications.  MEDIASTINUM AND CARMEN: No lymphadenopathy.  CHEST WALL AND LOWER NECK: Focus of coarse calcification within the left thyroid lobe.    ABDOMEN AND PELVIS:  LIVER: Within normal limits.  BILE DUCTS: Normal caliber.  GALLBLADDER: Cholelithiasis.  SPLEEN: Within normal limits.  PANCREAS: Within normal limits.  ADRENALS: Within normal limits.  KIDNEYS/URETERS: Mild right and moderate left hydronephroureter to the level of the bladder bilaterally. Within the distal left ureter, just proximal to the ureterovesicular junction, there is a calculus or conglomerate of calculi stacked upon each other measuring 1.3 x 0.4 cm (series 4 image 78), likely the cause of upstream hydronephroureter.There is a 1 mm nonobstructing calculus in the lower pole of the right kidney, and a nonobstructing calculus or conglomerate of calculi in the lower pole the left kidney measuring 1.7 x 1.2 cm. There is an indeterminate 1.5 cm exophytic lesion arising from the lower pole the left kidney (series 3 image 166).    BLADDER: Markedly distended. Several bladder calculi, the largest measuring 3.4 x 2.9 cm.  REPRODUCTIVE ORGANS: Enlarged prostate. Symmetric seminal vesicles. Within the midline dorsal aspect of the penis, there is curvilinear coarse calcification, potentially located within the dorsal vein.    BOWEL: Moderate hiatal hernia.  No bowel obstruction. Appendix is normal.  PERITONEUM: No ascites.  VESSELS: Advanced atherosclerotic changes. No abdominal aortic aneurysm. 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  RETROPERITONEUM/LYMPH NODES: No lymphadenopathy.  ABDOMINAL WALL: Moderate right inguinal hernia containing fat and loops of small bowel without evidence of obstruction. Small left fat-containing inguinal hernia and tiny fat-containing umbilical hernia.  BONES: Diffuse osseous demineralization. Multilevel degenerative changes of the spine and exaggerated thoracic kyphosis. Grade 1 anterolisthesis of L4 over L5. Moderate anterior wedge compression fracture deformity of T12. No significant osseous retropulsion.    IMPRESSION:  1.  Mild right and moderate left hydronephroureter, with obstructing calculus/calculi in the distal left ureter. There is marked distention of the urinary bladder, potentially accounting for the right-sided hydronephroureter. Consider bladder catheterization and follow-up with renal ultrasound to assess for resolution.  2.  Indeterminate left renal lesion. This may be further assessed at the time of follow-up ultrasound or with contrast enhanced MRI utilizing renal mass protocol.  3.  Multiple large bladder calculi.  4.  Prostamegaly.  5.  Emphysema with atelectasis and/or scarring in both lung bases with marked volume loss of the right middle lobe.  6.  Scattered subcentimeter pulmonary nodules in the left lower lobe, which require no additional follow-up in patient considered to be of low risk. Optional chest CT in 12 months may performed to monitor for stability.  7.  Ectasia of the ascending thoracic aorta to 4.2 cm.  8.  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  9.  Moderate right inguinal hernia containing small bowel without evidence of obstruction. Moderate hiatal hernia.  10.  Age indeterminate moderate compression fracture deformity of T12.      
HPI:  Patient is a 80y old  Male brought by EMS after syncopal episode while walking with PT at a rehab. Found to be in ROBERT, Cr 2.54 associated with modest hyperK. Denies CKD hx.   EKG with VICTORINO. Placed on O2 for hypoxia.   UA with RBCs  Pt is awake, alert. Able to provide limited hx. Denies recent NSAIDs, difficulty voiding. Has chronic LE edema  Home meds include ACE-I, KCL, Lasix.         PAST MEDICAL & SURGICAL HISTORY:  Gout    Cellulitis  Right lower leg    COPD exacerbation    HTN (hypertension)    H/O ulcerative colitis    Hyperlipidemia    History of hemochromatosis    History of hemochromatosis        Social Hx:     FAMILY HISTORY:      Allergies    No Known Allergies    Intolerances        MEDICATIONS  (STANDING):  atorvastatin 10 milliGRAM(s) Oral at bedtime  budesonide 160 MICROgram(s)/formoterol 4.5 MICROgram(s) Inhaler 2 Puff(s) Inhalation two times a day  influenza   Vaccine 0.5 milliLiter(s) IntraMuscular once  metoprolol tartrate 25 milliGRAM(s) Oral two times a day  nystatin Powder 1 Application(s) Topical two times a day  sodium chloride 0.9%. 1000 milliLiter(s) (100 mL/Hr) IV Continuous <Continuous>  tiotropium 18 MICROgram(s) Capsule 1 Capsule(s) Inhalation daily    MEDICATIONS  (PRN):  acetaminophen   Tablet .. 650 milliGRAM(s) Oral every 6 hours PRN Temp greater or equal to 38C (100.4F), Mild Pain (1 - 3)      Daily Height in cm: 190.5 (03 Sep 2021 11:23)    Daily     Drug Dosing Weight  Height (cm): 190.5 (03 Sep 2021 11:23)  Weight (kg): 83.9 (03 Sep 2021 11:23)  BMI (kg/m2): 23.1 (03 Sep 2021 11:23)  BSA (m2): 2.12 (03 Sep 2021 11:23)      REVIEW OF SYSTEMS:    Per HPI            I&O's Detail    03 Sep 2021 07:01  -  04 Sep 2021 07:00  --------------------------------------------------------  IN:    sodium chloride 0.9%: 800 mL    Sodium Chloride 0.9% Bolus: 1000 mL    Sodium Chloride 0.9% Bolus: 500 mL  Total IN: 2300 mL    OUT:    Voided (mL): 750 mL  Total OUT: 750 mL    Total NET: 1550 mL           @ 07:01  -   @ 07:00  --------------------------------------------------------  IN: 2300 mL / OUT: 750 mL / NET: 1550 mL        PHYSICAL EXAM:    GENERAL: NAD  HEAD:  Atraumatic, normocephalic  EYES: EOMI, PERRLA. Conjunctiva and sclera clear  ENMT: moist mucous membranes.   NECK: Supple. No increase in JVP  NERVOUS SYSTEM:  Alert & Oriented X3. Motor Strength 5/5 B/L upper and lower extremities; DTRs 2+ intact and symmetric  CHEST/LUNG: Clear to auscultation bilaterally  HEART: Regular rate and rhythm. No murmurs, rubs, or gallops  ABDOMEN: Soft, Nontender, Nondistended. POS BS  EXTREMITIES:  venous HTN changes, trace edema    LABS:  CBC Full  -  ( 04 Sep 2021 08:03 )  WBC Count : 11.74 K/uL  RBC Count : 3.54 M/uL  Hemoglobin : 11.8 g/dL  Hematocrit : 36.2 %  Platelet Count - Automated : 143 K/uL  Mean Cell Volume : 102.3 fl  Mean Cell Hemoglobin : 33.3 pg  Mean Cell Hemoglobin Concentration : 32.6 gm/dL  Auto Neutrophil # : x  Auto Lymphocyte # : x  Auto Monocyte # : x  Auto Eosinophil # : x  Auto Basophil # : x  Auto Neutrophil % : x  Auto Lymphocyte % : x  Auto Monocyte % : x  Auto Eosinophil % : x  Auto Basophil % : x        146<H>  |  115<H>  |  65<H>  ----------------------------<  106<H>  5.4<H>   |  23  |  2.08<H>    Ca    8.1<L>      04 Sep 2021 08:03  Phos  5.0       Mg     1.7         TPro  6.1  /  Alb  2.5<L>  /  TBili  0.8  /  DBili  x   /  AST  32  /  ALT  76<H>  /  AlkPhos  147<H>      CAPILLARY BLOOD GLUCOSE          Urinalysis Basic - ( 03 Sep 2021 12:07 )    Color: Yellow / Appearance: Clear / S.015 / pH: x  Gluc: x / Ketone: Negative  / Bili: Negative / Urobili: Negative mg/dL   Blood: x / Protein: 30 mg/dL / Nitrite: Negative   Leuk Esterase: Small / RBC: >50 /HPF / WBC 0-2   Sq Epi: x / Non Sq Epi: Few / Bacteria: Few      CARDIAC MARKERS ( 03 Sep 2021 12:07 )  .000 ng/mL / x     / 46 U/L / x     / x          Impression:  * ROBERT -- suspect ischemic ATN ( syncope, hemodynamic effect of VICTORINO ) while on ACE-I  * HyperK -- ROBERT, ACE-I effect, exogenous K  * Syncope  * VICTORINO    Recommendations:   * Lower saline to 50cc per hour and taper over next 24h  * Keep off ACE-I, KCL, Lasix  * Check Renal US  * Repeat UA in few days. If hematuria persists, consider Urology eval soon after dc.     Thank you!
CHIEF COMPLAINT: Urinary retention    HISTORY OF PRESENT ILLNESS: 79 y/o man with a hx of bph, but minimal sxs recently, has developed urinary retention during this hospitalization. His catheter was removed last night, and he was st cath x 2 last night. Today he has > 500 cc in bladder and RN was unable to pass iglesias. He is not uncomfortable. Accoring to pt, he did not tolerate rapaflo in the past (2018). He is on flomax now.  In addition, on CT 9/4, he was noted to have large bilat non obstructing renal stone, large bladder stones, and a 1.3 cm stone or group of stones causing moderate left hydro at the left uvj    PAST MEDICAL & SURGICAL HISTORY:  Gout    Cellulitis  Right lower leg    COPD exacerbation    HTN (hypertension)    H/O ulcerative colitis    Hyperlipidemia    History of hemochromatosis    History of hemochromatosis        REVIEW OF SYSTEMS:    Review of Systems: REVIEW OF SYSTEMS:  CONSTITUTIONAL: No fever, weight loss, or fatigue  EYES: No eye pain, visual disturbances, or discharge  ENMT:  No difficulty hearing, tinnitus, vertigo; No sinus or throat pain  NECK: No pain or stiffness  BREASTS: No pain, masses, or nipple discharge  RESPIRATORY: No cough, wheezing, chills or hemoptysis; No shortness of breath  CARDIOVASCULAR: No chest pain, palpitations, dizziness, or leg swelling  GASTROINTESTINAL: No abdominal or epigastric pain. No nausea, vomiting, or hematemesis; No diarrhea or constipation. No melena or hematochezia.  GENITOURINARY: see hpi  NEUROLOGICAL: No headaches, memory loss, loss of strength, numbness, or tremors  SKIN: No itching, burning, rashes, or lesions   LYMPH NODES: No enlarged glands  ENDOCRINE: No heat or cold intolerance; No hair loss; No polydipsia or polyuria  MUSCULOSKELETAL: No joint pain or swelling; No muscle, back, or extremity pain  PSYCHIATRIC: No depression, anxiety, mood swings, or difficulty sleeping  HEME/LYMPH: No easy bruising, or bleeding gums  ALLERGY AND IMMUNOLOGIC: No hives or eczema      MEDICATIONS  (STANDING):  apixaban 5 milliGRAM(s) Oral two times a day  atorvastatin 10 milliGRAM(s) Oral at bedtime  budesonide 160 MICROgram(s)/formoterol 4.5 MICROgram(s) Inhaler 2 Puff(s) Inhalation two times a day  influenza   Vaccine 0.5 milliLiter(s) IntraMuscular once  metoprolol tartrate 25 milliGRAM(s) Oral two times a day  nystatin Powder 1 Application(s) Topical two times a day  potassium chloride    Tablet ER 40 milliEquivalent(s) Oral every 4 hours  tamsulosin 0.4 milliGRAM(s) Oral at bedtime  tiotropium 18 MICROgram(s) Capsule 1 Capsule(s) Inhalation daily    MEDICATIONS  (PRN):  acetaminophen   Tablet .. 650 milliGRAM(s) Oral every 6 hours PRN Temp greater or equal to 38C (100.4F), Mild Pain (1 - 3)      Allergies    No Known Allergies    Intolerances        SOCIAL HISTORY:    FAMILY HISTORY:      Vital Signs Last 24 Hrs  T(C): 36.3 (07 Sep 2021 14:00), Max: 37 (07 Sep 2021 01:12)  T(F): 97.4 (07 Sep 2021 14:00), Max: 98.6 (07 Sep 2021 01:12)  HR: 87 (07 Sep 2021 14:00) (61 - 97)  BP: 124/76 (07 Sep 2021 14:00) (115/77 - 146/87)  BP(mean): --  RR: 18 (07 Sep 2021 14:00) (18 - 18)  SpO2: 100% (07 Sep 2021 14:00) (97% - 100%)    PHYSICAL EXAM:    Constitutional: NAD, well-developed  HEENT: WILLIAM, EOMI, Normal Hearing, MMM  Neck: No LAD, No JVD  Back: Normal spine flexure, No CVA tenderness  Respiratory: not using accessory muscles   Abd: BS+, soft, NT/ND, No CVAT  : Normal phallus,open meatus,bilateral descended testes, no masses  Extremities: No peripheral edema  Vascular: 2+ peripheral pulses  Neurological: A/O x 3, no focal deficits  Psychiatric: Normal mood, normal affect  Skin: No rashes    LABS:                        10.9   6.83  )-----------( 182      ( 07 Sep 2021 07:49 )             32.5     09-07    143  |  110<H>  |  26<H>  ----------------------------<  90  3.2<L>   |  26  |  1.08    Ca    7.3<L>      07 Sep 2021 07:49          Urine Culture: Culture - Urine (09.04.21 @ 21:53)   Specimen Source: Catheterized Catheterized   Culture Results:   No growth     RADIOLOGY & ADDITIONAL STUDIES:  < from: CT Abdomen and Pelvis No Cont (09.04.21 @ 09:11) >  EXAM:  CT ABDOMEN AND PELVIS                          EXAM:  CT CHEST                                  PROCEDURE DATE:  09/04/2021          INTERPRETATION:  CLINICAL INFORMATION: Admitted with syncope and new onset atrial fibrillation. Shortness ofbreath, ROBERT, weight loss.    COMPARISON: None.    CONTRAST/COMPLICATIONS:  IV Contrast: NONE  Oral Contrast: NONE  Complications: None reported at time of study completion    PROCEDURE:  CT of the Chest, Abdomen and Pelvis was performed.  Sagittal and coronal reformats were performed.    FINDINGS:    CHEST:  LUNGS AND LARGE AIRWAYS: Patent central airways. Upper lung predominant mild to moderate changes of centrilobular and paraseptal emphysema. Bands of atelectasis and/or scarring within the left lower, right lower, and right middle lobes with marked volume loss of the right middle lobe. Scattered pulmonary nodules within the left lower lobe measuring up to 5 mm. For example there is a 5 mm nodule visualized on series 3 image 96, and a 3 mm nodule on series 3 image 116.  PLEURA: No pleural effusion or pneumothorax.  VESSELS: Atherosclerotic changes. Tortuous thoracic aorta with ectasia of the ascending aorta, measures 4.2 cm in diameter. Coronary artery calcifications.  HEART: Heart sizeis normal. No pericardial effusion. Aortic valve calcifications.  MEDIASTINUM AND CARMEN: No lymphadenopathy.  CHEST WALL AND LOWER NECK: Focus of coarse calcification within the left thyroid lobe.    ABDOMEN AND PELVIS:  LIVER: Within normal limits.  BILE DUCTS: Normal caliber.  GALLBLADDER: Cholelithiasis.  SPLEEN: Within normal limits.  PANCREAS: Within normal limits.  ADRENALS: Within normal limits.  KIDNEYS/URETERS: Mild right and moderate left hydronephroureter to the level of the bladder bilaterally. Within the distal left ureter, just proximal to the ureterovesicular junction, there is a calculus or conglomerate of calculi stacked upon each other measuring 1.3 x 0.4 cm (series 4 image 78), likely the cause of upstream hydronephroureter.There is a 1 mm nonobstructing calculus in the lower pole of the right kidney, and a nonobstructing calculus or conglomerate of calculi in the lower pole the left kidney measuring 1.7 x 1.2 cm. There is an indeterminate 1.5 cm exophytic lesion arising from the lower pole the left kidney (series 3 image 166).    BLADDER: Markedly distended. Several bladder calculi, the largest measuring 3.4 x 2.9 cm.  REPRODUCTIVE ORGANS: Enlarged prostate. Symmetric seminal vesicles. Within the midline dorsal aspect of the penis, there is curvilinear coarse calcification, potentially located within the dorsal vein.    BOWEL: Moderate hiatal hernia.  No bowel obstruction. Appendix is normal.  PERITONEUM: No ascites.  VESSELS: Advanced atherosclerotic changes. No abdominal aortic aneurysm. 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  RETROPERITONEUM/LYMPH NODES: No lymphadenopathy.  ABDOMINAL WALL: Moderate right inguinal hernia containing fat and loops of small bowel without evidence of obstruction. Small left fat-containing inguinal hernia and tiny fat-containing umbilical hernia.  BONES: Diffuse osseous demineralization. Multilevel degenerative changes of the spine and exaggerated thoracic kyphosis. Grade 1 anterolisthesis of L4 over L5. Moderate anterior wedge compression fracture deformity of T12. No significant osseous retropulsion.    IMPRESSION:  1.  Mild right and moderate left hydronephroureter, with obstructing calculus/calculi in the distal left ureter. There is marked distention of the urinary bladder, potentially accounting for the right-sided hydronephroureter. Consider bladder catheterization and follow-up with renal ultrasound to assess for resolution.  2.  Indeterminate left renal lesion. This may be further assessed at the time of follow-up ultrasound or with contrast enhanced MRI utilizing renal mass protocol.  3.  Multiple large bladder calculi.  4.  Prostamegaly.  5.  Emphysema with atelectasis and/or scarring in both lung bases with marked volume loss of the right middle lobe.  6.  Scattered subcentimeter pulmonary nodules in the left lower lobe, which require no additional follow-up in patient considered to be of low risk. Optional chest CT in 12 months may performed to monitor for stability.  7.  Ectasia of the ascending thoracic aorta to 4.2 cm.  8.  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  9.  Moderate right inguinal hernia containing small bowel without evidence of obstruction. Moderate hiatal hernia.  10.  Age indeterminate moderate compression fracture deformity of T12.    Findings were discussed with Dr. Trinh 9/4/2021 1:24 PM by Dr. Wheat.    --- End of Report ---              LASHAWN WHEAT MD; Attending Radiologist  This document has been electronically signed. Sep  4 2021  1:25PM    < end of copied text >  < from: US Renal (09.04.21 @ 13:15) >  EXAM:  US KIDNEY(S)                                  PROCEDURE DATE:  09/04/2021          INTERPRETATION:  CLINICAL INFORMATION: ROBERT    COMPARISON: CT chest, abdomen, pelvis 9/4/2021    TECHNIQUE: Sonography of the kidneys and bladder.    FINDINGS:    Right kidney: 10.6 cm. No hydronephrosis, which has resolved since the CT exam performed earlier today. No renal mass. The punctate calculus in the lower pole of the right kidney identified on the concurrent CT exam is not identified.    Left kidney: 11.5 cm. Mild hydronephrosis, which is improved since the CT performed earlier today. Large shadowing nonobstructing calculus measuring 1.7 cm in the lower pole. The questioned indeterminate renal lesion visualized on the CT exam is not identified.    Urinary bladder: Underdistended and suboptimally assessed.    Prostate: Enlarged.    IMPRESSION:    Interval resolution of right hydronephrosis. There remains mild left hydronephrosis, which is improved since the CT performed earlier today.    Nonobstructing 1.7 cm calculus in the lower pole of the left kidney.    The indeterminate left renal lesion identified on the CT exam is not visualized. Consider further evaluation with nonemergent contrast enhanced abdominal MRI utilizing renal mass protocol.    Prostamegaly.    Interval decompression of the urinary bladder.    Findings were discussed with Dr. Trinh 9/4/2021 1:26 PM by Dr. Wheat.    --- End of Report ---              LASHAWN WHEAT MD; Attending Radiologist  This document has been electronically signed. Sep  4 2021  1:32PM    < end of copied text >  
History of Present Illness: The patient is an 80 year old male with a history of HTN, HL, COPD, hemochromatosis who presents with syncope and shortness of breath. He states he was at [facility] having a test done when he started to feel lightheaded and passed out. He is unsure what happened after. He currently feels improved but notes some shortness of breath, cough with sputum production. He also feels a bit lightheaded but denies chest pain or palpitations.    Past Medical/Surgical History:  HTN, HL, COPD, hemochromatosis    Medications:  Home Medications:  atorvastatin 10 mg oral tablet: 1 tab(s) orally once a day (03 Sep 2021 12:00)  Cardizem  mg/24 hours oral capsule, extended release: 1 cap(s) orally once a day (03 Sep 2021 12:00)  folic acid 1 mg oral tablet: 1 tab(s) orally once a day (03 Sep 2021 12:00)  lactobacillus acidophilus oral tablet:  (03 Sep 2021 12:01)  Lasix 40 mg oral tablet: 1 tab(s) orally once a day (03 Sep 2021 12:01)  lisinopril 10 mg oral tablet: 1 tab(s) orally once a day (03 Sep 2021 12:01)  Tubersol 5 tuberculin units/0.1 mL intradermal solution:  (03 Sep 2021 12:00)      Family History: Non-contributory family history of premature cardiovascular atherosclerotic disease    Social History: No tobacco, alcohol or drug use    Review of Systems:  General: No fevers, chills, weight loss or gain  Skin: No rashes, color changes  Cardiovascular: No chest pain, orthopnea  Respiratory: No shortness of breath, cough  Gastrointestinal: No nausea, abdominal pain  Genitourinary: No incontinence, pain with urination  Musculoskeletal: No pain, swelling, decreased range of motion  Neurological: No headache, weakness  Psychiatric: No depression, anxiety  Endocrine: No weight loss or gain, increased thirst  All other systems are comprehensively negative.    Physical Exam:  Vitals:        Vital Signs Last 24 Hrs  T(C): 37.1 (03 Sep 2021 11:30), Max: 37.1 (03 Sep 2021 11:30)  T(F): 98.7 (03 Sep 2021 11:30), Max: 98.7 (03 Sep 2021 11:30)  HR: 114 (03 Sep 2021 11:30) (114 - 128)  BP: 118/76 (03 Sep 2021 11:30) (114/75 - 118/76)  BP(mean): --  RR: 22 (03 Sep 2021 11:30) (22 - 24)  SpO2: 93% (03 Sep 2021 11:30) (87% - 93%)  General: NAD  HEENT: MMM  Neck: No JVD, no carotid bruit  Lungs: CTAB  CV: RRR, nl S1/S2, no M/R/G  Abdomen: S/NT/ND, +BS  Extremities: No LE edema, no cyanosis  Neuro: AAOx3, non-focal  Skin: No rash    Labs:                  ECG: AF, nonspecific ST abnormality    Telemetry: AF, PVCs

## 2021-09-08 NOTE — PROGRESS NOTE ADULT - SUBJECTIVE AND OBJECTIVE BOX
Chief Complaint: Syncope    Interval Events: No events overnight.    Review of Systems:  General: No fevers, chills, weight loss or gain  Skin: No rashes, color changes  Cardiovascular: No chest pain, orthopnea  Respiratory: No shortness of breath, cough  Gastrointestinal: No nausea, abdominal pain  Genitourinary: No incontinence, pain with urination  Musculoskeletal: No pain, swelling, decreased range of motion  Neurological: No headache, weakness  Psychiatric: No depression, anxiety  Endocrine: No weight loss or gain, increased thirst  All other systems are comprehensively negative.    Physical Exam:  Vital Signs Last 24 Hrs  T(C): 36.8 (08 Sep 2021 05:01), Max: 37.6 (07 Sep 2021 18:38)  T(F): 98.2 (08 Sep 2021 05:01), Max: 99.6 (07 Sep 2021 18:38)  HR: 65 (08 Sep 2021 05:01) (59 - 87)  BP: 128/85 (08 Sep 2021 05:01) (115/77 - 147/86)  BP(mean): --  RR: 16 (08 Sep 2021 05:01) (16 - 18)  SpO2: 95% (08 Sep 2021 05:01) (95% - 100%)  General: NAD  HEENT: MMM  Neck: No JVD, no carotid bruit  Lungs: CTAB  CV: RRR, nl S1/S2, no M/R/G  Abdomen: S/NT/ND, +BS  Extremities: No LE edema, no cyanosis  Neuro: AAOx3, non-focal  Skin: No rash    Labs:    09-08    139  |  106  |  19  ----------------------------<  167<H>  3.6   |  26  |  1.07    Ca    7.4<L>      08 Sep 2021 09:03                          12.2   9.22  )-----------( 213      ( 08 Sep 2021 09:03 )             36.1       Telemetry: AF, PVCs

## 2021-09-08 NOTE — DISCHARGE NOTE PROVIDER - NSDCMRMEDTOKEN_GEN_ALL_CORE_FT
Advair Diskus 250 mcg-50 mcg inhalation powder: 1 puff(s) inhaled 2 times a day  atorvastatin 10 mg oral tablet: 1 tab(s) orally once a day  budesonide 9 mg oral tablet, extended release: 1 tab(s) orally once a day (in the morning)  Cardizem  mg/24 hours oral capsule, extended release: 1 cap(s) orally once a day  Cipro 500 mg oral tablet: 1 tab(s) orally every 12 hours  folic acid 1 mg oral tablet: 1 tab(s) orally once a day  K-Dur 20 oral tablet, extended release: 1 tab(s) orally 2 times a day  lactobacillus acidophilus oral tablet:   Lasix 40 mg oral tablet: 1 tab(s) orally once a day  lisinopril 10 mg oral tablet: 1 tab(s) orally once a day  magnesium oxide 400 mg oral tablet: 1 tab(s) orally once a day  Metoprolol Tartrate 25 mg oral tablet: 1 tab(s) orally 2 times a day  metroNIDAZOLE 500 mg oral tablet: 1 tab(s) orally 3 times a day  Spiriva HandiHaler 18 mcg inhalation capsule: 1 cap(s) inhaled once a day  Tubersol 5 tuberculin units/0.1 mL intradermal solution:    acetaminophen 325 mg oral tablet: 2 tab(s) orally every 6 hours, As needed, Temp greater or equal to 38C (100.4F), Mild Pain (1 - 3)  Advair Diskus 250 mcg-50 mcg inhalation powder: 1 puff(s) inhaled 2 times a day  apixaban 5 mg oral tablet: 1 tab(s) orally 2 times a day  atorvastatin 10 mg oral tablet: 1 tab(s) orally once a day  budesonide 9 mg oral tablet, extended release: 1 tab(s) orally once a day (in the morning)  folic acid 1 mg oral tablet: 1 tab(s) orally once a day  lactobacillus acidophilus oral capsule: 1 tab(s) orally once a day  Lasix 40 mg oral tablet: 1 tab(s) orally once a day  lisinopril 10 mg oral tablet: 1 tab(s) orally once a day  loperamide 2 mg oral capsule: 1 cap(s) orally every 4 hours, As needed, Diarrhea  metoprolol tartrate 25 mg oral tablet: 1 tab(s) orally 2 times a day  nystatin 100,000 units/g topical powder: 1 application topically 2 times a day  Spiriva HandiHaler 18 mcg inhalation capsule: 1 cap(s) inhaled once a day  tamsulosin 0.4 mg oral capsule: 1 cap(s) orally once a day (at bedtime)   acetaminophen 325 mg oral tablet: 2 tab(s) orally every 6 hours, As needed, Temp greater or equal to 38C (100.4F), Mild Pain (1 - 3)  Advair Diskus 250 mcg-50 mcg inhalation powder: 1 puff(s) inhaled 2 times a day  apixaban 5 mg oral tablet: 1 tab(s) orally 2 times a day  atorvastatin 10 mg oral tablet: 1 tab(s) orally once a day  budesonide 9 mg oral tablet, extended release: 1 tab(s) orally once a day (in the morning)  folic acid 1 mg oral tablet: 1 tab(s) orally once a day  lactobacillus acidophilus oral capsule: 1 tab(s) orally once a day  lisinopril 10 mg oral tablet: 1 tab(s) orally once a day  loperamide 2 mg oral capsule: 1 cap(s) orally every 4 hours, As needed, Diarrhea  metoprolol tartrate 25 mg oral tablet: 1 tab(s) orally 2 times a day  nystatin 100,000 units/g topical powder: 1 application topically 2 times a day  Spiriva HandiHaler 18 mcg inhalation capsule: 1 cap(s) inhaled once a day  tamsulosin 0.4 mg oral capsule: 1 cap(s) orally once a day (at bedtime)

## 2021-09-08 NOTE — DISCHARGE NOTE PROVIDER - HOSPITAL COURSE
HPI:  80M HTN, HLD, COPD, Hemachromatosis who was in Rehab after being admitted in [facility] from 8/9 thru 8/23 for Crystal Arthropathy and RLE Cellulitis treated with Antibiotics and Steroids. Patient today was reported to have syncopized, where patient does not recall the details of, but does state that for the past 2 days has been not feeling well and increasing SOB.  No chest pain or palpitations. No Fevers or chills. No nausea or vomiting.      In the ED routine labs and imaging done.  Was found to be in Atrial Fibrillation with RVR, Acute Renal Failure, and elevated D. Dimer.  EKG revealed Atrial Fibrillation with 's. GIven IVF and Metoprolol initially.  Patient also hypoxic and placed on 3L NC. Then was started on Digoxin and admitted for further management.  (03 Sep 2021 13:57)    HOSPITAL COURSE:        VITAL SIGNS:    Vital Signs Last 24 Hrs  T(C): 36.4 (08 Sep 2021 09:36), Max: 37.6 (07 Sep 2021 18:38)  T(F): 97.6 (08 Sep 2021 09:36), Max: 99.6 (07 Sep 2021 18:38)  HR: 89 (08 Sep 2021 09:36) (59 - 89)  BP: 115/77 (08 Sep 2021 09:36) (115/77 - 147/86)  BP(mean): --  RR: 17 (08 Sep 2021 09:36) (16 - 18)  SpO2: 99% (08 Sep 2021 09:36) (95% - 100%)    PHYSICAL EXAM:     GENERAL: no acute distress  HEENT: NC/AT, EOMI, neck supple, MMM  RESPIRATORY: LCTAB/L, no rhonchi, rales, or wheezing  CARDIOVASCULAR: RRR, no murmurs, gallops, rubs  ABDOMINAL: soft, non-tender, non-distended, positive bowel sounds   EXTREMITIES: no clubbing, cyanosis, or edema  NEUROLOGICAL: alert and oriented x 3, non-focal  SKIN: no rashes or lesions   MUSCULOSKELETAL: no gross joint deformity                          12.2   9.22  )-----------( 213      ( 08 Sep 2021 09:03 )             36.1     09-08    139  |  106  |  19  ----------------------------<  167<H>  3.6   |  26  |  1.07    Ca    7.4<L>      08 Sep 2021 09:03               HPI:  80M HTN, HLD, COPD, Hemachromatosis who was in Rehab after being admitted in [facility] from 8/9 thru 8/23 for Crystal Arthropathy and RLE Cellulitis treated with Antibiotics and Steroids. Patient today was reported to have syncopized, where patient does not recall the details of, but does state that for the past 2 days has been not feeling well and increasing SOB.  No chest pain or palpitations. No Fevers or chills. No nausea or vomiting.      In the ED routine labs and imaging done.  Was found to be in Atrial Fibrillation with RVR, Acute Renal Failure, and elevated D. Dimer.  EKG revealed Atrial Fibrillation with 's. GIven IVF and Metoprolol initially.  Patient also hypoxic and placed on 3L NC. Then was started on Digoxin and admitted for further management.  (03 Sep 2021 13:57)    HOSPITAL COURSE:    Pt was seen by cardiology for finding of a fib and was started on eliquis and rate controlled with metoprolol 25 bid.  Pt had no further chest pain or shortness of breath.  Pt's hospital course was complicated by urinary retention. and was seen by Dr. Qiu in Urology who felt patient's multiple kidney stones needed to be followed up on in the outpatient setting, but for now recommended flomax and iglesias cathter.  PT evaluated patient as well, and social work arranged for transfer to Rehab.  Patinet was to follow up with Dr. Gore in Cardiology and Dr. Qiu in Urology in outpatient setting.    VITAL SIGNS:    Vital Signs Last 24 Hrs  T(C): 36.4 (08 Sep 2021 09:36), Max: 37.6 (07 Sep 2021 18:38)  T(F): 97.6 (08 Sep 2021 09:36), Max: 99.6 (07 Sep 2021 18:38)  HR: 89 (08 Sep 2021 09:36) (59 - 89)  BP: 115/77 (08 Sep 2021 09:36) (115/77 - 147/86)  BP(mean): --  RR: 17 (08 Sep 2021 09:36) (16 - 18)  SpO2: 99% (08 Sep 2021 09:36) (95% - 100%)    PHYSICAL EXAM:     GENERAL: NAD  HEENT: eomi, MMM  RESPIRATORY: CTAB  CARDIOVASCULAR: s1 and s2, rrr  ABDOMINAL: soft, nd, nt, +bs  EXTREMITIES: no c/c/ pedal edema  NEUROLOGICAL: alert and oriented x 3, non-focal  SKIN: no new rashes or lesions   MUSCULOSKELETAL: no gross joint deformity                          12.2   9.22  )-----------( 213      ( 08 Sep 2021 09:03 )             36.1     09-08    139  |  106  |  19  ----------------------------<  167<H>  3.6   |  26  |  1.07    Ca    7.4<L>      08 Sep 2021 09:03               HPI:  80M HTN, HLD, COPD, Hemachromatosis who was in Rehab after being admitted in [facility] from 8/9 thru 8/23 for Crystal Arthropathy and RLE Cellulitis treated with Antibiotics and Steroids. Patient today was reported to have syncopized, where patient does not recall the details of, but does state that for the past 2 days has been not feeling well and increasing SOB.  No chest pain or palpitations. No Fevers or chills. No nausea or vomiting.      In the ED routine labs and imaging done.  Was found to be in Atrial Fibrillation with RVR, Acute Renal Failure, and elevated D. Dimer.  EKG revealed Atrial Fibrillation with 's. GIven IVF and Metoprolol initially.  Patient also hypoxic and placed on 3L NC. Then was started on Digoxin and admitted for further management.  (03 Sep 2021 13:57)    HOSPITAL COURSE:    Pt was seen by cardiology for finding of a fib and was started on eliquis and rate controlled with metoprolol 25 bid.  2d echo shows normal LV function.  Pt had no further chest pain or shortness of breath.  Pt's hospital course was complicated by urinary retention. and was seen by Dr. Qiu in Urology who felt patient's multiple kidney stones needed to be followed up on in the outpatient setting, but for now recommended flomax and iglesias cathter.  UA mildly positive, but urine culture negative, ID evaluated patient and recommended monitor off abx.  PT evaluated patient as well, and social work arranged for transfer to Rehab.  Patient was to follow up with Dr. Gore in Cardiology and Dr. Qiu in Urology in outpatient setting.    VITAL SIGNS:    Vital Signs Last 24 Hrs  T(C): 36.4 (08 Sep 2021 09:36), Max: 37.6 (07 Sep 2021 18:38)  T(F): 97.6 (08 Sep 2021 09:36), Max: 99.6 (07 Sep 2021 18:38)  HR: 89 (08 Sep 2021 09:36) (59 - 89)  BP: 115/77 (08 Sep 2021 09:36) (115/77 - 147/86)  BP(mean): --  RR: 17 (08 Sep 2021 09:36) (16 - 18)  SpO2: 99% (08 Sep 2021 09:36) (95% - 100%)    PHYSICAL EXAM:     GENERAL: NAD  HEENT: eomi, MMM  RESPIRATORY: CTAB  CARDIOVASCULAR: s1 and s2, rrr  ABDOMINAL: soft, nd, nt, +bs  EXTREMITIES: no c/c/ pedal edema  NEUROLOGICAL: alert and oriented x 3, non-focal  SKIN: no new rashes or lesions   MUSCULOSKELETAL: no gross joint deformity                          12.2   9.22  )-----------( 213      ( 08 Sep 2021 09:03 )             36.1     09-08    139  |  106  |  19  ----------------------------<  167<H>  3.6   |  26  |  1.07    Ca    7.4<L>      08 Sep 2021 09:03               HPI:  80M HTN, HLD, COPD, Hemachromatosis who was in Rehab after being admitted in [facility] from 8/9 thru 8/23 for Crystal Arthropathy and RLE Cellulitis treated with Antibiotics and Steroids. Patient today was reported to have syncopized, where patient does not recall the details of, but does state that for the past 2 days has been not feeling well and increasing SOB.  No chest pain or palpitations. No Fevers or chills. No nausea or vomiting.      In the ED routine labs and imaging done.  Was found to be in Atrial Fibrillation with RVR, Acute Renal Failure, and elevated D. Dimer.  EKG revealed Atrial Fibrillation with 's. GIven IVF and Metoprolol initially.  Patient also hypoxic and placed on 3L NC. Then was started on Digoxin and admitted for further management.  (03 Sep 2021 13:57)    HOSPITAL COURSE:    Pt was seen by cardiology for finding of a fib and was started on eliquis and rate controlled with metoprolol 25 bid.  2d echo shows normal LV function, mild diastolic dysfunction.  Pt had no further chest pain or shortness of breath.  Pt's hospital course was complicated by urinary retention. and was seen by Dr. Qiu in Urology who felt patient's multiple kidney stones needed to be followed up on in the outpatient setting, but for now recommended flomax and iglesias cathter.  UA mildly positive, but urine culture negative, ID evaluated patient and recommended monitor off abx.  PT evaluated patient as well, and social work arranged for transfer to Rehab.  Patient was to follow up with Dr. Gore in Cardiology and Dr. Qiu in Urology in outpatient setting.    VITAL SIGNS:    Vital Signs Last 24 Hrs  T(C): 36.4 (08 Sep 2021 09:36), Max: 37.6 (07 Sep 2021 18:38)  T(F): 97.6 (08 Sep 2021 09:36), Max: 99.6 (07 Sep 2021 18:38)  HR: 89 (08 Sep 2021 09:36) (59 - 89)  BP: 115/77 (08 Sep 2021 09:36) (115/77 - 147/86)  BP(mean): --  RR: 17 (08 Sep 2021 09:36) (16 - 18)  SpO2: 99% (08 Sep 2021 09:36) (95% - 100%)    PHYSICAL EXAM:     GENERAL: NAD  HEENT: eomi, MMM  RESPIRATORY: CTAB  CARDIOVASCULAR: s1 and s2, rrr  ABDOMINAL: soft, nd, nt, +bs  EXTREMITIES: no c/c/ pedal edema  NEUROLOGICAL: alert and oriented x 3, non-focal  SKIN: no new rashes or lesions   MUSCULOSKELETAL: no gross joint deformity                          12.2   9.22  )-----------( 213      ( 08 Sep 2021 09:03 )             36.1     09-08    139  |  106  |  19  ----------------------------<  167<H>  3.6   |  26  |  1.07    Ca    7.4<L>      08 Sep 2021 09:03

## 2021-09-08 NOTE — DISCHARGE NOTE PROVIDER - NSDCACTIVITY_GEN_ALL_CORE
Bathing allowed/Do not drive or operate machinery/Showering allowed/Stairs allowed/Walking - Indoors allowed/Walking - Outdoors allowed

## 2021-09-09 PROBLEM — J44.9 CHRONIC OBSTRUCTIVE PULMONARY DISEASE, UNSPECIFIED: Chronic | Status: ACTIVE | Noted: 2021-08-09

## 2021-09-09 PROBLEM — E78.5 HYPERLIPIDEMIA, UNSPECIFIED: Chronic | Status: ACTIVE | Noted: 2021-08-09

## 2021-09-09 PROBLEM — Z86.39 PERSONAL HISTORY OF OTHER ENDOCRINE, NUTRITIONAL AND METABOLIC DISEASE: Chronic | Status: ACTIVE | Noted: 2021-08-09

## 2021-09-09 PROBLEM — I10 ESSENTIAL (PRIMARY) HYPERTENSION: Chronic | Status: ACTIVE | Noted: 2021-08-09

## 2021-09-09 RX ORDER — DILTIAZEM HCL 120 MG
1 CAPSULE, EXT RELEASE 24 HR ORAL
Qty: 0 | Refills: 0 | DISCHARGE

## 2021-09-09 RX ORDER — FUROSEMIDE 40 MG
1 TABLET ORAL
Qty: 0 | Refills: 0 | DISCHARGE

## 2021-09-09 RX ORDER — TUBERCULIN PURIFIED PROTEIN DERIVATIVE 5 [IU]/.1ML
0 INJECTION, SOLUTION INTRADERMAL
Qty: 0 | Refills: 0 | DISCHARGE

## 2021-09-09 RX ORDER — MAGNESIUM OXIDE 400 MG ORAL TABLET 241.3 MG
1 TABLET ORAL
Qty: 0 | Refills: 0 | DISCHARGE

## 2021-09-09 RX ORDER — POTASSIUM CHLORIDE 20 MEQ
1 PACKET (EA) ORAL
Qty: 0 | Refills: 0 | DISCHARGE

## 2021-09-09 RX ORDER — MOXIFLOXACIN HYDROCHLORIDE TABLETS, 400 MG 400 MG/1
1 TABLET, FILM COATED ORAL
Qty: 0 | Refills: 0 | DISCHARGE

## 2021-09-09 RX ORDER — METOPROLOL TARTRATE 50 MG
1 TABLET ORAL
Qty: 0 | Refills: 0 | DISCHARGE

## 2021-09-09 RX ORDER — LACTOBACILLUS ACIDOPHILUS 100MM CELL
0 CAPSULE ORAL
Qty: 0 | Refills: 0 | DISCHARGE

## 2021-09-09 RX ORDER — METRONIDAZOLE 500 MG
1 TABLET ORAL
Qty: 0 | Refills: 0 | DISCHARGE

## 2021-10-07 RX ORDER — VALACYCLOVIR 500 MG/1
2 TABLET, FILM COATED ORAL
Qty: 0 | Refills: 0 | DISCHARGE
Start: 2021-10-07 | End: 2021-10-15

## 2021-10-14 ENCOUNTER — INPATIENT (INPATIENT)
Facility: HOSPITAL | Age: 81
LOS: 0 days | Discharge: HOSPICE | DRG: 698 | End: 2021-10-15
Attending: FAMILY MEDICINE | Admitting: INTERNAL MEDICINE
Payer: MEDICARE

## 2021-10-14 VITALS
SYSTOLIC BLOOD PRESSURE: 92 MMHG | OXYGEN SATURATION: 95 % | WEIGHT: 175.05 LBS | DIASTOLIC BLOOD PRESSURE: 61 MMHG | HEIGHT: 75 IN | TEMPERATURE: 98 F | HEART RATE: 73 BPM | RESPIRATION RATE: 19 BRPM

## 2021-10-14 DIAGNOSIS — N17.9 ACUTE KIDNEY FAILURE, UNSPECIFIED: ICD-10-CM

## 2021-10-14 LAB
ALBUMIN SERPL ELPH-MCNC: 1.7 G/DL — LOW (ref 3.3–5)
ALP SERPL-CCNC: 127 U/L — HIGH (ref 40–120)
ALT FLD-CCNC: 46 U/L — SIGNIFICANT CHANGE UP (ref 12–78)
ANION GAP SERPL CALC-SCNC: 9 MMOL/L — SIGNIFICANT CHANGE UP (ref 5–17)
APTT BLD: 33 SEC — SIGNIFICANT CHANGE UP (ref 27.5–35.5)
AST SERPL-CCNC: 13 U/L — LOW (ref 15–37)
BASOPHILS # BLD AUTO: 0.04 K/UL — SIGNIFICANT CHANGE UP (ref 0–0.2)
BASOPHILS NFR BLD AUTO: 0.2 % — SIGNIFICANT CHANGE UP (ref 0–2)
BILIRUB SERPL-MCNC: 0.5 MG/DL — SIGNIFICANT CHANGE UP (ref 0.2–1.2)
BUN SERPL-MCNC: 105 MG/DL — HIGH (ref 7–23)
CALCIUM SERPL-MCNC: 8.6 MG/DL — SIGNIFICANT CHANGE UP (ref 8.5–10.1)
CHLORIDE SERPL-SCNC: 115 MMOL/L — HIGH (ref 96–108)
CO2 SERPL-SCNC: 22 MMOL/L — SIGNIFICANT CHANGE UP (ref 22–31)
CREAT SERPL-MCNC: 2.93 MG/DL — HIGH (ref 0.5–1.3)
EOSINOPHIL # BLD AUTO: 0.1 K/UL — SIGNIFICANT CHANGE UP (ref 0–0.5)
EOSINOPHIL NFR BLD AUTO: 0.4 % — SIGNIFICANT CHANGE UP (ref 0–6)
GLUCOSE SERPL-MCNC: 153 MG/DL — HIGH (ref 70–99)
HCT VFR BLD CALC: 28.9 % — LOW (ref 39–50)
HGB BLD-MCNC: 9.5 G/DL — LOW (ref 13–17)
IMM GRANULOCYTES NFR BLD AUTO: 1.4 % — SIGNIFICANT CHANGE UP (ref 0–1.5)
INR BLD: 1.72 RATIO — HIGH (ref 0.88–1.16)
LACTATE SERPL-SCNC: 1.9 MMOL/L — SIGNIFICANT CHANGE UP (ref 0.7–2)
LYMPHOCYTES # BLD AUTO: 1.77 K/UL — SIGNIFICANT CHANGE UP (ref 1–3.3)
LYMPHOCYTES # BLD AUTO: 7.3 % — LOW (ref 13–44)
MCHC RBC-ENTMCNC: 32.9 GM/DL — SIGNIFICANT CHANGE UP (ref 32–36)
MCHC RBC-ENTMCNC: 33.5 PG — SIGNIFICANT CHANGE UP (ref 27–34)
MCV RBC AUTO: 101.8 FL — HIGH (ref 80–100)
MONOCYTES # BLD AUTO: 0.68 K/UL — SIGNIFICANT CHANGE UP (ref 0–0.9)
MONOCYTES NFR BLD AUTO: 2.8 % — SIGNIFICANT CHANGE UP (ref 2–14)
NEUTROPHILS # BLD AUTO: 21.37 K/UL — HIGH (ref 1.8–7.4)
NEUTROPHILS NFR BLD AUTO: 87.9 % — HIGH (ref 43–77)
PLATELET # BLD AUTO: 274 K/UL — SIGNIFICANT CHANGE UP (ref 150–400)
POTASSIUM SERPL-MCNC: 4 MMOL/L — SIGNIFICANT CHANGE UP (ref 3.5–5.3)
POTASSIUM SERPL-SCNC: 4 MMOL/L — SIGNIFICANT CHANGE UP (ref 3.5–5.3)
PROT SERPL-MCNC: 5.6 GM/DL — LOW (ref 6–8.3)
PROTHROM AB SERPL-ACNC: 19.4 SEC — HIGH (ref 10.6–13.6)
RBC # BLD: 2.84 M/UL — LOW (ref 4.2–5.8)
RBC # FLD: 15.3 % — HIGH (ref 10.3–14.5)
SARS-COV-2 RNA SPEC QL NAA+PROBE: SIGNIFICANT CHANGE UP
SODIUM SERPL-SCNC: 146 MMOL/L — HIGH (ref 135–145)
TROPONIN I, HIGH SENSITIVITY RESULT: 14.7 NG/L — SIGNIFICANT CHANGE UP
WBC # BLD: 24.3 K/UL — HIGH (ref 3.8–10.5)
WBC # FLD AUTO: 24.3 K/UL — HIGH (ref 3.8–10.5)

## 2021-10-14 PROCEDURE — 99285 EMERGENCY DEPT VISIT HI MDM: CPT

## 2021-10-14 PROCEDURE — 93010 ELECTROCARDIOGRAM REPORT: CPT

## 2021-10-14 PROCEDURE — 83605 ASSAY OF LACTIC ACID: CPT

## 2021-10-14 PROCEDURE — 84145 PROCALCITONIN (PCT): CPT

## 2021-10-14 PROCEDURE — 74176 CT ABD & PELVIS W/O CONTRAST: CPT | Mod: 26,MA

## 2021-10-14 PROCEDURE — 36415 COLL VENOUS BLD VENIPUNCTURE: CPT

## 2021-10-14 PROCEDURE — 71045 X-RAY EXAM CHEST 1 VIEW: CPT | Mod: 26

## 2021-10-14 PROCEDURE — 71045 X-RAY EXAM CHEST 1 VIEW: CPT

## 2021-10-14 PROCEDURE — 86769 SARS-COV-2 COVID-19 ANTIBODY: CPT

## 2021-10-14 PROCEDURE — 80048 BASIC METABOLIC PNL TOTAL CA: CPT

## 2021-10-14 PROCEDURE — 85027 COMPLETE CBC AUTOMATED: CPT

## 2021-10-14 RX ORDER — CEFTRIAXONE 500 MG/1
1000 INJECTION, POWDER, FOR SOLUTION INTRAMUSCULAR; INTRAVENOUS ONCE
Refills: 0 | Status: COMPLETED | OUTPATIENT
Start: 2021-10-14 | End: 2021-10-14

## 2021-10-14 RX ORDER — ATORVASTATIN CALCIUM 80 MG/1
1 TABLET, FILM COATED ORAL
Qty: 0 | Refills: 0 | DISCHARGE

## 2021-10-14 RX ORDER — TIOTROPIUM BROMIDE 18 UG/1
1 CAPSULE ORAL; RESPIRATORY (INHALATION)
Qty: 0 | Refills: 0 | DISCHARGE

## 2021-10-14 RX ORDER — METOPROLOL TARTRATE 50 MG
0.5 TABLET ORAL
Qty: 0 | Refills: 0 | DISCHARGE

## 2021-10-14 RX ORDER — SODIUM CHLORIDE 9 MG/ML
250 INJECTION INTRAMUSCULAR; INTRAVENOUS; SUBCUTANEOUS ONCE
Refills: 0 | Status: COMPLETED | OUTPATIENT
Start: 2021-10-14 | End: 2021-10-14

## 2021-10-14 RX ORDER — SODIUM CHLORIDE 9 MG/ML
500 INJECTION INTRAMUSCULAR; INTRAVENOUS; SUBCUTANEOUS ONCE
Refills: 0 | Status: COMPLETED | OUTPATIENT
Start: 2021-10-14 | End: 2021-10-14

## 2021-10-14 RX ORDER — LISINOPRIL 2.5 MG/1
1 TABLET ORAL
Qty: 0 | Refills: 0 | DISCHARGE

## 2021-10-14 RX ORDER — DILTIAZEM HCL 120 MG
2.5 CAPSULE, EXT RELEASE 24 HR ORAL ONCE
Refills: 0 | Status: DISCONTINUED | OUTPATIENT
Start: 2021-10-14 | End: 2021-10-15

## 2021-10-14 RX ORDER — DILTIAZEM HCL 120 MG
1 CAPSULE, EXT RELEASE 24 HR ORAL
Qty: 0 | Refills: 0 | DISCHARGE

## 2021-10-14 RX ORDER — FLUTICASONE PROPIONATE AND SALMETEROL 50; 250 UG/1; UG/1
1 POWDER ORAL; RESPIRATORY (INHALATION)
Qty: 0 | Refills: 0 | DISCHARGE

## 2021-10-14 RX ORDER — POTASSIUM CHLORIDE 20 MEQ
4 PACKET (EA) ORAL
Qty: 0 | Refills: 0 | DISCHARGE

## 2021-10-14 RX ORDER — SODIUM CHLORIDE 9 MG/ML
3 INJECTION INTRAMUSCULAR; INTRAVENOUS; SUBCUTANEOUS ONCE
Refills: 0 | Status: COMPLETED | OUTPATIENT
Start: 2021-10-14 | End: 2021-10-14

## 2021-10-14 RX ORDER — FOLIC ACID 0.8 MG
1 TABLET ORAL
Qty: 0 | Refills: 0 | DISCHARGE

## 2021-10-14 RX ORDER — BUDESONIDE, MICRONIZED 100 %
1 POWDER (GRAM) MISCELLANEOUS
Qty: 0 | Refills: 0 | DISCHARGE

## 2021-10-14 RX ORDER — DILTIAZEM HCL 120 MG
2.5 CAPSULE, EXT RELEASE 24 HR ORAL ONCE
Refills: 0 | Status: DISCONTINUED | OUTPATIENT
Start: 2021-10-14 | End: 2021-10-14

## 2021-10-14 RX ORDER — ACETAMINOPHEN 500 MG
1000 TABLET ORAL ONCE
Refills: 0 | Status: COMPLETED | OUTPATIENT
Start: 2021-10-14 | End: 2021-10-14

## 2021-10-14 RX ADMIN — SODIUM CHLORIDE 500 MILLILITER(S): 9 INJECTION INTRAMUSCULAR; INTRAVENOUS; SUBCUTANEOUS at 22:10

## 2021-10-14 RX ADMIN — CEFTRIAXONE 1000 MILLIGRAM(S): 500 INJECTION, POWDER, FOR SOLUTION INTRAMUSCULAR; INTRAVENOUS at 23:06

## 2021-10-14 RX ADMIN — SODIUM CHLORIDE 500 MILLILITER(S): 9 INJECTION INTRAMUSCULAR; INTRAVENOUS; SUBCUTANEOUS at 19:37

## 2021-10-14 RX ADMIN — SODIUM CHLORIDE 3 MILLILITER(S): 9 INJECTION INTRAMUSCULAR; INTRAVENOUS; SUBCUTANEOUS at 19:39

## 2021-10-14 RX ADMIN — SODIUM CHLORIDE 500 MILLILITER(S): 9 INJECTION INTRAMUSCULAR; INTRAVENOUS; SUBCUTANEOUS at 23:06

## 2021-10-14 RX ADMIN — Medication 400 MILLIGRAM(S): at 23:53

## 2021-10-14 NOTE — ED ADULT NURSE REASSESSMENT NOTE - NS ED NURSE REASSESS COMMENT FT1
Patient received alert to person, mucus membranes very dry tongue cracked, edema to all extremities pitting +3.  Larkin catheter in place on arrival with approx 50cc of pink tinged cloudy urine.  Right #24 IV from nursing home in right wrist.  Patient family at bedside.  Patient sent in with abnormal labs.  DNR/DNI signed for patient over the weekend.

## 2021-10-14 NOTE — ED ADULT NURSE NOTE - NSIMPLEMENTINTERV_GEN_ALL_ED
Implemented All Fall with Harm Risk Interventions:  [place] to call system. Call bell, personal items and telephone within reach. Instruct patient to call for assistance. Room bathroom lighting operational. Non-slip footwear when patient is off stretcher. Physically safe environment: no spills, clutter or unnecessary equipment. Stretcher in lowest position, wheels locked, appropriate side rails in place. Provide visual cue, wrist band, yellow gown, etc. Monitor gait and stability. Monitor for mental status changes and reorient to person, place, and time. Review medications for side effects contributing to fall risk. Reinforce activity limits and safety measures with patient and family. Provide visual clues: red socks.

## 2021-10-14 NOTE — ED PROVIDER NOTE - CONSTITUTIONAL, MLM
normal... Well appearing, awake, alert, oriented to person, place, time/situation and in no apparent distress. Pale, ill-appearing, awake, alert, oriented to person, place, time/situation and in no apparent distress.

## 2021-10-14 NOTE — ED PROVIDER NOTE - NSICDXPASTMEDICALHX_GEN_ALL_CORE_FT
PAST MEDICAL HISTORY:  Cellulitis Right lower leg    COPD exacerbation     COPD, moderate     Gout     H/O ulcerative colitis     History of hemochromatosis     History of hemochromatosis     History of hemochromatosis     HTN (hypertension)     Hyperlipidemia     Hyperlipidemia     Hypertension

## 2021-10-14 NOTE — ED PROVIDER NOTE - PROGRESS NOTE DETAILS
REceived call from radiology that harris gutierrez is in prostate. RN just beginning to replace facility harris and informed. Sylvia WILSON Rn states large stone exited with urine after iglesias was removed. Approx 1cm stone placed in specimen cup. Sylvia WILSON

## 2021-10-14 NOTE — ED PROVIDER NOTE - CARDIAC, MLM
Normal rate, regular rhythm.  Heart sounds S1, S2.  No murmurs, rubs or gallops. Rapid and irregular rhythm.  Heart sounds S1, S2.  No murmurs, rubs or gallops.

## 2021-10-14 NOTE — ED ADULT NURSE NOTE - OBJECTIVE STATEMENT
Patient has multiple medical issues.  Sent in with abnormal labs.    VICTORINO 120-140 on arrival with hypotension, Family at bedside DNR DNI  Larkin catheter in place on arrival with 50cc in bag of cloudy urine  Right arm IV from Nursing home in place #24.

## 2021-10-14 NOTE — ED PROVIDER NOTE - CARE PLAN
Principal Discharge DX:	Acute on chronic renal failure  Secondary Diagnosis:	Acute UTI  Secondary Diagnosis:	Rapid atrial fibrillation   1

## 2021-10-14 NOTE — ED PROVIDER NOTE - SKIN, MLM
Skin normal color for race, warm, dry and intact. No evidence of rash. Skin normal color for race, warm, dry and intact. No evidence of rash. Edematous in arms and legs.

## 2021-10-14 NOTE — ED PROVIDER NOTE - CLINICAL SUMMARY MEDICAL DECISION MAKING FREE TEXT BOX
Pt with hx of cellulitis, COPD, gout , ulcerative colitis, hemochromatosis, HTN, HLD here with decreased oral intake for four days. Pt dehydrated but appears edematous. IV fluids, labs, admission. Pt with hx of cellulitis, COPD, gout , ulcerative colitis, hemochromatosis, HTN, HLD here with decreased oral intake for four days. Pt dehydrated but appears edematous .Also hypotensive and in rapid afib. Mentating well. Pt is dnr dni.IV fluids, labs, admission. CT abd pelvis to look for hydronephrosisSmall dose of cardizem. Pt with hx of cellulitis, COPD, gout , ulcerative colitis, hemochromatosis, HTN, HLD here with decreased oral intake for four days. Pt dehydrated but appears edematous. Hypotensive and in rapid afib. Mentating well.nl lactate, no fever. Indwelling iglesias with balloon in urethra. High wbc,  Pt is dnr dni. IV fluids,antibiotics, labs, admission. Urology consult. CT abd pelvis to look for hydronephrosis Small dose of cardizem to control rate when bp goes up after fluids. Daughter would like pt to be made comfortable but is ok with fluids and antibiotics.

## 2021-10-14 NOTE — ED ADULT NURSE REASSESSMENT NOTE - NS ED NURSE REASSESS COMMENT FT1
Assumed care of pt. Pt is A&OX2, disoriented to the year by one year. Daughter at bedside. DNR/DNI MOLST form in chart. Pt found to be hypotensive and in atrial fibrillation/RVR. MD Ward aware of VS. Pt noted to be edematous on all extremities. Larkin in place from [facility].  cc bolus started. Will ctm. Assumed care of pt. Pt is A&OX2, disoriented to the year by one year. Daughter at bedside. DNR/DNI MOLST form in chart. Pt found to be hypotensive and in atrial fibrillation/RVR. MD Ward aware of VS. Pt noted to be edematous on all extremities. Larkin in place from [facility].  cc bolus started per MD Ward orders. Will ctm.

## 2021-10-14 NOTE — ED ADULT TRIAGE NOTE - CHIEF COMPLAINT QUOTE
pt BIBA from [facility] d/t abnormal labs. WBC 24.2, elevated BUN & creatinine, Hgb 8.5. c/o fatigue. denies SOB, CP. pt has chronic iglesias dark, cloudy urine. 24G IV placed PTA. hx- afib, COPD, ROBERT, pressure ulcers.

## 2021-10-14 NOTE — ED PROVIDER NOTE - OBJECTIVE STATEMENT
81 y/o male with PMHx of cellulitis, COPD, gout , ulcerative colitis, hemochromatosis, HTN, HLD presents to the ED BIBA from [facility] c/o abnormal lab results. WBC 24.2, elevated BUN & creatinine, Hgb 8.5. c/o fatigue. denies SOB, CP. pt has chronic iglesias dark, cloudy urine. 24G IV placed PTA. hx- afib, COPD, ROBERT, pressure ulcers. 81 y/o male with PMHx of cellulitis, COPD, gout , ulcerative colitis, hemochromatosis, HTN, HLD presents to the ED c/o abnormal lab results. Pt has not been eating and drinking for the past four days and had abnormal labs and was sent to the ED for evaluation. 79 y/o male with PMHx of cellulitis, COPD, gout , ulcerative colitis, hemochromatosis, HTN, HLD presents to the ED c/o abnormal lab results. Pt has not been eating and drinking for the past four days and had abnormal labs and was sent to the ED for evaluation. Pt has no c/o cough. fever or sob. Has indwelling iglesias and hx of kidney stone. Per daughter at bedside, facility was concerned that pt's urinary tract was obstructed by a stone. Pt had u/s of arms due to swelling. Results not known. Nonsmoker nondrinker no drugs. Pt c/o pain everywhere when he moves on stretcher.

## 2021-10-14 NOTE — PHARMACOTHERAPY INTERVENTION NOTE - COMMENTS
Medication Reconciliation complete. Medication and Allergies reviewed with MAR provided by [facility] compared to [place].

## 2021-10-14 NOTE — ED ADULT NURSE NOTE - CHIEF COMPLAINT QUOTE
pt BIBA from [facility] d/t abnormal labs. WBC 24.2, elevated BUN & creatinine, Hgb 8.5. c/o fatigue. denies SOB, CP. pt has chronic iglesias dark, cloudy urine. 24G IV placed PTA. hx- afib, COPD, ROBERT, pressure ulcers.

## 2021-10-15 ENCOUNTER — TRANSCRIPTION ENCOUNTER (OUTPATIENT)
Age: 81
End: 2021-10-15

## 2021-10-15 VITALS
TEMPERATURE: 97 F | SYSTOLIC BLOOD PRESSURE: 107 MMHG | DIASTOLIC BLOOD PRESSURE: 73 MMHG | RESPIRATION RATE: 16 BRPM | HEART RATE: 76 BPM

## 2021-10-15 PROBLEM — E78.5 HYPERLIPIDEMIA, UNSPECIFIED: Chronic | Status: ACTIVE | Noted: 2021-09-03

## 2021-10-15 PROBLEM — Z87.19 PERSONAL HISTORY OF OTHER DISEASES OF THE DIGESTIVE SYSTEM: Chronic | Status: ACTIVE | Noted: 2021-09-03

## 2021-10-15 PROBLEM — Z86.39 PERSONAL HISTORY OF OTHER ENDOCRINE, NUTRITIONAL AND METABOLIC DISEASE: Chronic | Status: ACTIVE | Noted: 2021-09-03

## 2021-10-15 PROBLEM — J44.1 CHRONIC OBSTRUCTIVE PULMONARY DISEASE WITH (ACUTE) EXACERBATION: Chronic | Status: ACTIVE | Noted: 2021-09-03

## 2021-10-15 PROBLEM — I10 ESSENTIAL (PRIMARY) HYPERTENSION: Chronic | Status: ACTIVE | Noted: 2021-09-03

## 2021-10-15 PROBLEM — L03.90 CELLULITIS, UNSPECIFIED: Chronic | Status: ACTIVE | Noted: 2021-09-03

## 2021-10-15 PROBLEM — M10.9 GOUT, UNSPECIFIED: Chronic | Status: ACTIVE | Noted: 2021-09-03

## 2021-10-15 LAB
ANION GAP SERPL CALC-SCNC: 7 MMOL/L — SIGNIFICANT CHANGE UP (ref 5–17)
APPEARANCE UR: ABNORMAL
BILIRUB UR-MCNC: NEGATIVE — SIGNIFICANT CHANGE UP
BUN SERPL-MCNC: 106 MG/DL — HIGH (ref 7–23)
CALCIUM SERPL-MCNC: 7.9 MG/DL — LOW (ref 8.5–10.1)
CHLORIDE SERPL-SCNC: 117 MMOL/L — HIGH (ref 96–108)
CO2 SERPL-SCNC: 20 MMOL/L — LOW (ref 22–31)
COLOR SPEC: YELLOW — SIGNIFICANT CHANGE UP
COVID-19 SPIKE DOMAIN AB INTERP: NEGATIVE — SIGNIFICANT CHANGE UP
COVID-19 SPIKE DOMAIN ANTIBODY RESULT: 0.4 U/ML — SIGNIFICANT CHANGE UP
CREAT SERPL-MCNC: 2.62 MG/DL — HIGH (ref 0.5–1.3)
DIFF PNL FLD: ABNORMAL
GLUCOSE SERPL-MCNC: 147 MG/DL — HIGH (ref 70–99)
GLUCOSE UR QL: NEGATIVE MG/DL — SIGNIFICANT CHANGE UP
HCT VFR BLD CALC: 25.3 % — LOW (ref 39–50)
HGB BLD-MCNC: 8.3 G/DL — LOW (ref 13–17)
KETONES UR-MCNC: NEGATIVE — SIGNIFICANT CHANGE UP
LACTATE SERPL-SCNC: 1.6 MMOL/L — SIGNIFICANT CHANGE UP (ref 0.7–2)
LEUKOCYTE ESTERASE UR-ACNC: ABNORMAL
MCHC RBC-ENTMCNC: 32.8 GM/DL — SIGNIFICANT CHANGE UP (ref 32–36)
MCHC RBC-ENTMCNC: 33.6 PG — SIGNIFICANT CHANGE UP (ref 27–34)
MCV RBC AUTO: 102.4 FL — HIGH (ref 80–100)
NITRITE UR-MCNC: NEGATIVE — SIGNIFICANT CHANGE UP
PH UR: 5 — SIGNIFICANT CHANGE UP (ref 5–8)
PLATELET # BLD AUTO: 258 K/UL — SIGNIFICANT CHANGE UP (ref 150–400)
POTASSIUM SERPL-MCNC: 3.7 MMOL/L — SIGNIFICANT CHANGE UP (ref 3.5–5.3)
POTASSIUM SERPL-SCNC: 3.7 MMOL/L — SIGNIFICANT CHANGE UP (ref 3.5–5.3)
PROCALCITONIN SERPL-MCNC: 3.78 NG/ML — HIGH (ref 0.02–0.1)
PROT UR-MCNC: 30 MG/DL
RBC # BLD: 2.47 M/UL — LOW (ref 4.2–5.8)
RBC # FLD: 15.2 % — HIGH (ref 10.3–14.5)
SARS-COV-2 IGG+IGM SERPL QL IA: 0.4 U/ML — SIGNIFICANT CHANGE UP
SARS-COV-2 IGG+IGM SERPL QL IA: NEGATIVE — SIGNIFICANT CHANGE UP
SODIUM SERPL-SCNC: 144 MMOL/L — SIGNIFICANT CHANGE UP (ref 135–145)
SP GR SPEC: 1.01 — SIGNIFICANT CHANGE UP (ref 1.01–1.02)
UROBILINOGEN FLD QL: NEGATIVE MG/DL — SIGNIFICANT CHANGE UP
WBC # BLD: 22.18 K/UL — HIGH (ref 3.8–10.5)
WBC # FLD AUTO: 22.18 K/UL — HIGH (ref 3.8–10.5)

## 2021-10-15 PROCEDURE — 99497 ADVNCD CARE PLAN 30 MIN: CPT | Mod: 25

## 2021-10-15 PROCEDURE — 99223 1ST HOSP IP/OBS HIGH 75: CPT

## 2021-10-15 PROCEDURE — 99236 HOSP IP/OBS SAME DATE HI 85: CPT

## 2021-10-15 RX ORDER — ALBUMIN HUMAN 25 %
250 VIAL (ML) INTRAVENOUS ONCE
Refills: 0 | Status: DISCONTINUED | OUTPATIENT
Start: 2021-10-15 | End: 2021-10-15

## 2021-10-15 RX ORDER — MORPHINE SULFATE 50 MG/1
2 CAPSULE, EXTENDED RELEASE ORAL EVERY 4 HOURS
Refills: 0 | Status: DISCONTINUED | OUTPATIENT
Start: 2021-10-15 | End: 2021-10-15

## 2021-10-15 RX ORDER — LIDOCAINE 4 G/100G
1 CREAM TOPICAL
Qty: 0 | Refills: 0 | DISCHARGE

## 2021-10-15 RX ORDER — ALBUMIN HUMAN 25 %
50 VIAL (ML) INTRAVENOUS ONCE
Refills: 0 | Status: DISCONTINUED | OUTPATIENT
Start: 2021-10-15 | End: 2021-10-15

## 2021-10-15 RX ORDER — LANOLIN ALCOHOL/MO/W.PET/CERES
3 CREAM (GRAM) TOPICAL AT BEDTIME
Refills: 0 | Status: DISCONTINUED | OUTPATIENT
Start: 2021-10-15 | End: 2021-10-15

## 2021-10-15 RX ORDER — ACETAMINOPHEN 500 MG
650 TABLET ORAL EVERY 6 HOURS
Refills: 0 | Status: DISCONTINUED | OUTPATIENT
Start: 2021-10-15 | End: 2021-10-15

## 2021-10-15 RX ORDER — MAGNESIUM HYDROXIDE 400 MG/1
30 TABLET, CHEWABLE ORAL
Qty: 0 | Refills: 0 | DISCHARGE

## 2021-10-15 RX ORDER — INFLUENZA VIRUS VACCINE 15; 15; 15; 15 UG/.5ML; UG/.5ML; UG/.5ML; UG/.5ML
0.5 SUSPENSION INTRAMUSCULAR ONCE
Refills: 0 | Status: COMPLETED | OUTPATIENT
Start: 2021-10-15 | End: 2021-10-15

## 2021-10-15 RX ORDER — NYSTATIN CREAM 100000 [USP'U]/G
1 CREAM TOPICAL
Qty: 0 | Refills: 0 | DISCHARGE

## 2021-10-15 RX ORDER — BUDESONIDE AND FORMOTEROL FUMARATE DIHYDRATE 160; 4.5 UG/1; UG/1
1 AEROSOL RESPIRATORY (INHALATION)
Qty: 0 | Refills: 0 | DISCHARGE

## 2021-10-15 RX ORDER — MORPHINE SULFATE 50 MG/1
2 CAPSULE, EXTENDED RELEASE ORAL
Qty: 0 | Refills: 0 | DISCHARGE
Start: 2021-10-15

## 2021-10-15 RX ORDER — FOLIC ACID 0.8 MG
1 TABLET ORAL
Qty: 0 | Refills: 0 | DISCHARGE

## 2021-10-15 RX ORDER — SODIUM CHLORIDE 9 MG/ML
1000 INJECTION INTRAMUSCULAR; INTRAVENOUS; SUBCUTANEOUS ONCE
Refills: 0 | Status: COMPLETED | OUTPATIENT
Start: 2021-10-15 | End: 2021-10-15

## 2021-10-15 RX ORDER — TRAMADOL HYDROCHLORIDE 50 MG/1
0.5 TABLET ORAL
Qty: 0 | Refills: 0 | DISCHARGE

## 2021-10-15 RX ORDER — PIPERACILLIN AND TAZOBACTAM 4; .5 G/20ML; G/20ML
3.38 INJECTION, POWDER, LYOPHILIZED, FOR SOLUTION INTRAVENOUS ONCE
Refills: 0 | Status: COMPLETED | OUTPATIENT
Start: 2021-10-15 | End: 2021-10-15

## 2021-10-15 RX ORDER — POTASSIUM CHLORIDE 20 MEQ
1 PACKET (EA) ORAL
Qty: 0 | Refills: 0 | DISCHARGE

## 2021-10-15 RX ORDER — LANOLIN/MINERAL OIL
1 LOTION (ML) TOPICAL
Qty: 0 | Refills: 0 | DISCHARGE

## 2021-10-15 RX ORDER — ONDANSETRON 8 MG/1
4 TABLET, FILM COATED ORAL EVERY 8 HOURS
Refills: 0 | Status: DISCONTINUED | OUTPATIENT
Start: 2021-10-15 | End: 2021-10-15

## 2021-10-15 RX ORDER — ASCORBIC ACID 60 MG
1 TABLET,CHEWABLE ORAL
Qty: 0 | Refills: 0 | DISCHARGE

## 2021-10-15 RX ORDER — ATORVASTATIN CALCIUM 80 MG/1
1 TABLET, FILM COATED ORAL
Qty: 0 | Refills: 0 | DISCHARGE

## 2021-10-15 RX ORDER — FUROSEMIDE 40 MG
1 TABLET ORAL
Qty: 0 | Refills: 0 | DISCHARGE

## 2021-10-15 RX ORDER — MORPHINE SULFATE 50 MG/1
2 CAPSULE, EXTENDED RELEASE ORAL
Refills: 0 | Status: DISCONTINUED | OUTPATIENT
Start: 2021-10-15 | End: 2021-10-15

## 2021-10-15 RX ORDER — MULTIVIT-MIN/FERROUS GLUCONATE 9 MG/15 ML
1 LIQUID (ML) ORAL
Qty: 0 | Refills: 0 | DISCHARGE

## 2021-10-15 RX ORDER — MIDODRINE HYDROCHLORIDE 2.5 MG/1
10 TABLET ORAL ONCE
Refills: 0 | Status: COMPLETED | OUTPATIENT
Start: 2021-10-15 | End: 2021-10-15

## 2021-10-15 RX ORDER — PIPERACILLIN AND TAZOBACTAM 4; .5 G/20ML; G/20ML
3.38 INJECTION, POWDER, LYOPHILIZED, FOR SOLUTION INTRAVENOUS EVERY 12 HOURS
Refills: 0 | Status: DISCONTINUED | OUTPATIENT
Start: 2021-10-15 | End: 2021-10-15

## 2021-10-15 RX ORDER — VANCOMYCIN HCL 1 G
1000 VIAL (EA) INTRAVENOUS ONCE
Refills: 0 | Status: COMPLETED | OUTPATIENT
Start: 2021-10-15 | End: 2021-10-15

## 2021-10-15 RX ORDER — FERROUS SULFATE 325(65) MG
1 TABLET ORAL
Qty: 0 | Refills: 0 | DISCHARGE

## 2021-10-15 RX ADMIN — MORPHINE SULFATE 2 MILLIGRAM(S): 50 CAPSULE, EXTENDED RELEASE ORAL at 17:05

## 2021-10-15 RX ADMIN — MIDODRINE HYDROCHLORIDE 10 MILLIGRAM(S): 2.5 TABLET ORAL at 06:09

## 2021-10-15 RX ADMIN — MORPHINE SULFATE 2 MILLIGRAM(S): 50 CAPSULE, EXTENDED RELEASE ORAL at 11:32

## 2021-10-15 RX ADMIN — SODIUM CHLORIDE 1000 MILLILITER(S): 9 INJECTION INTRAMUSCULAR; INTRAVENOUS; SUBCUTANEOUS at 01:27

## 2021-10-15 RX ADMIN — PIPERACILLIN AND TAZOBACTAM 200 GRAM(S): 4; .5 INJECTION, POWDER, LYOPHILIZED, FOR SOLUTION INTRAVENOUS at 06:09

## 2021-10-15 RX ADMIN — Medication 250 MILLIGRAM(S): at 06:38

## 2021-10-15 RX ADMIN — SODIUM CHLORIDE 1000 MILLILITER(S): 9 INJECTION INTRAMUSCULAR; INTRAVENOUS; SUBCUTANEOUS at 05:04

## 2021-10-15 NOTE — H&P ADULT - ASSESSMENT
#Atrial fibrillation with RVR  #Echo with normal LV systolic function, mild pulm HTN  -Continue metoprolol tartrate 25mg bid for rate control.if BP is >110  -Hold Eliquis 5mg bid for anticoagulation.     #Urinary Obstruction with  ROBERT (acute kidney injury), L renal lesion, hematuria, hydronephrosis, multiple renal stones, prostamegaly  -Urology consult - Dr Navarrete  -continue  flomax    #Hypertension.   - Continue Metoprolol with BP parameters   - Hold Lisinopril, Furosemide due to ROBERT  and low BP    #Hyperlipidemia.   -Continue Lipitor.    #COPD (chronic obstructive pulmonary disease).   -Continue Symbicort and Spiriva.   -Oxygen supplementation as needed.    #DVT Prophylactic measure.   -Hold On Eliquis 5mg bid for anticoagulation for possible intervention with IR or Urology   #Sepsis POA 2ndry to UTI associated with Larkin Cath   #Hypotension, Tachycardia and   #Urinary Obstruction with  ROBERT (acute kidney injury), L renal lesion, hematuria, hydronephrosis, multiple renal stones, prostamegaly  #FTT   -Discussed with daughter and patient at this time they dont want any further invasive procedures.  Wants to be strict comfort   -IV morphine 2mg IV Q6H PRN for pain or dyspnea   -IV Ativan 0.25mg IV Q6H PRN for agitation  -Patient refusing IVF and IV ABX  -SW consult for inpatient hospice  -Palliative consult to help with comfort meds     #Atrial fibrillation with RVR  #Echo with normal LV systolic function, mild pulm HTN  -Hold metoprolol tartrate 25mg bid for rate control.  if BP is >110  -Hold Eliquis 5mg bid for anticoagulation.    #Hypertension.   - hold Metoprolol with BP parameters   - Hold Lisinopril, Furosemide due to ROBERT  and low BP    #Hyperlipidemia.   -Continue Lipitor.    #COPD (chronic obstructive pulmonary disease).   -hold Symbicort and Spiriva.   -Oxygen supplementation as needed.    #DVT Prophylactic measure.   -Hold On Eliquis 5mg bid for anticoagulation for possible intervention with IR or Urology    # Advanced Care Planning 35 minutes.  Discussion with patient and family regarding advance directives. At this time they would like to be DNR/DNI. No ABX, IVF, Feeding Tube, STOP ALL PO meds; No blood draws. Strict comfort measure only; Do not send back to the hospital;  Form signed

## 2021-10-15 NOTE — ED ADULT NURSE REASSESSMENT NOTE - NS ED NURSE REASSESS COMMENT FT1
Larkin replaced in ED. Pt turned, cleaned and repositioned. Two stage 3 wounds noted to B/L sacral region. Right wound measuring 1x1, left wound measuring 2x1. Mepilex in place on spine PTA, stage 3 pressure wound noted to spine measuring 2x1.

## 2021-10-15 NOTE — DISCHARGE NOTE PROVIDER - NSDCMRMEDTOKEN_GEN_ALL_CORE_FT
LORazepam: 0.25 milligram(s) intravenous every 6 hours PRN Agitation  morphine: 2 milligram(s) intravenous every 2 hours, As Needed dyspnea

## 2021-10-15 NOTE — H&P ADULT - NSHPPHYSICALEXAM_GEN_ALL_CORE
PHYSICAL EXAM:  Constitutional: NAD, awake and alert, well-developed  Neurological: AAO x 3, no focal deficits  HEENT: PERRLA, EOMI, MMM  Neck: Soft and supple, No LAD, No JVD  Respiratory: Breath sounds are clear bilaterally, No wheezing, rales or rhonchi  Cardiovascular: S1 and S2, regular rate and rhythm; no Murmurs, gallops or rubs  Gastrointestinal: Bowel Sounds present, soft, nontender, nondistended, no guarding, no rebound tenderness  Back: No CVA tenderness   Extremities: No peripheral edema  Vascular: 2+ peripheral pulses  Musculoskeletal: 5/5 strength b/l upper and lower extremities  Skin: No rashes  Breast: Deferred  Rectal: Deferred PHYSICAL EXAM:  Constitutional: NAD, awake malnourished   Neurological: AAO x 2, no focal deficits  HEENT: PERRLA, EOMI, MMM  Neck: Soft and supple, No LAD, No JVD  Respiratory: Breath sounds are clear bilaterally, No wheezing, rales or rhonchi  Cardiovascular: S1 and S2, regular rate and rhythm; no Murmurs, gallops or rubs  Gastrointestinal: Bowel Sounds present, soft, nontender, nondistended, no guarding, no rebound tenderness  Back: No CVA tenderness   Extremities: +peripheral edema; +RUE edema and hematomas  Vascular: 2+ peripheral pulses  Musculoskeletal: 5/5 strength b/l upper and lower extremities  Skin: No rashes  Breast: Deferred  Rectal: Deferred

## 2021-10-15 NOTE — DISCHARGE NOTE NURSING/CASE MANAGEMENT/SOCIAL WORK - PATIENT PORTAL LINK FT
You can access the FollowMyHealth Patient Portal offered by [facility] by registering at the following website: [email or web address]. By joining [employer]’s FollowMyHealth portal, you will also be able to view your health information using other applications (apps) compatible with our system.

## 2021-10-15 NOTE — ED ADULT NURSE REASSESSMENT NOTE - NS ED NURSE REASSESS COMMENT FT1
Oral care provided to patient. Patient able to tolerate PO intake. Medications administered as per orders. Will ctm.

## 2021-10-15 NOTE — CONSULT NOTE ADULT - ASSESSMENT
Pt is a 80y old Male with hx of COPD, HTN, HLD, hemochromatosis presents to ED c/o abnormal lab results. Pt has not been eating and drinking for the past four days and had abnormal labs and was sent to the ED for evaluation IN the ER patient was septic, tachycardiac and hypotensive; went into AFIB with RVR;     1) Sepsis   - 2/2 UTI associated with Larkin Cath   - Hypotension, Tachycardia and   - urology consult placed   - family does not want further IV fluids or antibitoics     2) Severe Protein Calorie Malnutrition   - as per daughter has not been eating   - Albumin, Serum: 1.7 g/dL  - pt unable to tolerate PO at this time as he is unresponsive     3) COPD   - increased respiratory effort   - IV morphine 2mg IV Q6H PRN for pain or dyspnea   - IV Ativan 0.25mg IV Q6H PRN for agitation  - supplemental O2 PRN for comfort     4) Advance care planning   - patient lacks cacapcity   - daughter surrogate decision maker   - MOLST: DNR/DNI. No ABX, IVF, Feeding Tube, STOP ALL PO meds; No blood draws. Strict comfort measure only; Do not send back to the hospital;  Form signed  - family requesting inpatient hospice referal, spoke with ELENA Warner 
A:    80M  HD # 1  Now DNR/DNI/comfort measures    Here for:    1. UTI  2. Urinary retention  3. ROBERT 2/2 post obstructive    P/recommendation:    Now DNR/DNI/comfort measures only, symptoms mgmt  No aggressive measures  Consider palliative/hospice measures    No need for ICU    Dispo: Cont care. OK for admission to medical floor.

## 2021-10-15 NOTE — ED ADULT NURSE REASSESSMENT NOTE - NS ED NURSE REASSESS COMMENT FT1
Pt third spacing is worsening, edematous in all four extremities. MD Bonner notified via telephone of pt presentation and VS. Pt following commands. Another liter of NS infusing. ICU to be consulted, d/w MD Bonner via telephone.

## 2021-10-15 NOTE — H&P ADULT - NSHPREVIEWOFSYSTEMS_GEN_ALL_CORE
REVIEW OF SYSTEMS:    CONSTITUTIONAL: No weakness, fevers or chills  EYES/ENT: No visual changes; vertigo or throat pain   NECK: No pain or stiffness  RESPIRATORY: No cough, wheezing, hemoptysis or shortness of breath  CARDIOVASCULAR: No chest pain or palpitations  GASTROINTESTINAL: No abdominal or epigastric pain. No nausea, vomiting, or hematemesis; No diarrhea or constipation. No melena or hematochezia.  GENITOURINARY: No dysuria, urinary frequency or hematuria  NEUROLOGICAL: No numbness or weakness  EXTREMITIES: No swelling or tenderness  SKIN: No itching, burning, rashes, or lesions   All other review of systems is negative unless indicated above. REVIEW OF SYSTEMS:    CONSTITUTIONAL: + weakness, np fevers or chills  EYES/ENT: No visual changes; vertigo or throat pain   NECK: No pain or stiffness  RESPIRATORY: No cough, wheezing, hemoptysis or shortness of breath  CARDIOVASCULAR: No chest pain or palpitations  GASTROINTESTINAL: + abdominal pain. No nausea, vomiting, or hematemesis; No diarrhea or constipation. No melena or hematochezia.  GENITOURINARY: No dysuria, urinary frequency +_hematuria  NEUROLOGICAL: No numbness or weakness  EXTREMITIES: No swelling or tenderness  SKIN: No itching, burning, rashes, or lesions   All other review of systems is negative unless indicated above.

## 2021-10-15 NOTE — ED ADULT NURSE REASSESSMENT NOTE - NS ED NURSE REASSESS COMMENT FT1
MD Bonner called via telephone to notify of pt BP. 0.9% NaCl started. Pt to be held in ED at this time until MD at bedside for assessment.

## 2021-10-15 NOTE — H&P ADULT - NSHPLABSRESULTS_GEN_ALL_CORE
Lab Results:  CBC  CBC Full  -  ( 15 Oct 2021 07:19 )  WBC Count : 22.18 K/uL  RBC Count : 2.47 M/uL  Hemoglobin : 8.3 g/dL  Hematocrit : 25.3 %  Platelet Count - Automated : 258 K/uL  Mean Cell Volume : 102.4 fl  Mean Cell Hemoglobin : 33.6 pg  Mean Cell Hemoglobin Concentration : 32.8 gm/dL  Auto Neutrophil # : x  Auto Lymphocyte # : x  Auto Monocyte # : x  Auto Eosinophil # : x  Auto Basophil # : x  Auto Neutrophil % : x  Auto Lymphocyte % : x  Auto Monocyte % : x  Auto Eosinophil % : x  Auto Basophil % : x    .		Differential:	[] Automated		[] Manual  Chemistry                        8.3    22.18 )-----------( 258      ( 15 Oct 2021 07:19 )             25.3     10-15    144  |  117<H>  |  106<H>  ----------------------------<  147<H>  3.7   |  20<L>  |  2.62<H>    Ca    7.9<L>      15 Oct 2021 07:19    TPro  5.6<L>  /  Alb  1.7<L>  /  TBili  0.5  /  DBili  x   /  AST  13<L>  /  ALT  46  /  AlkPhos  127<H>  10-14    LIVER FUNCTIONS - ( 14 Oct 2021 18:47 )  Alb: 1.7 g/dL / Pro: 5.6 gm/dL / ALK PHOS: 127 U/L / ALT: 46 U/L / AST: 13 U/L / GGT: x           PT/INR - ( 14 Oct 2021 18:47 )   PT: 19.4 sec;   INR: 1.72 ratio         PTT - ( 14 Oct 2021 18:47 )  PTT:33.0 sec  Urinalysis Basic - ( 14 Oct 2021 18:47 )    Color: Yellow / Appearance: Slightly Turbid / S.010 / pH: x  Gluc: x / Ketone: Negative  / Bili: Negative / Urobili: Negative mg/dL   Blood: x / Protein: 30 mg/dL / Nitrite: Negative   Leuk Esterase: Moderate / RBC: 11-25 /HPF / WBC 11-25   Sq Epi: x / Non Sq Epi: Moderate / Bacteria: Few    RADIOLOGY RESULTS:    < from: CT Abdomen and Pelvis No Cont (10.14.21 @ 23:07) >      IMPRESSION:  Distended urinary bladder with large calculi in the dependent portion and small foci of air from recent instrumentation. Larkin catheter balloon within the prostate. Recommend advancement of the catheter. These findings were discussed with Dr. Ward of the emergency Department at 12:00 AM on 10/15/2021.    Moderate bilateral hydroureteronephrosis to the level of the bladder. Calculi in the distal left ureter measuring up to 6 mm at the pelvic brim and 2 mm proximal to the ureterovesical junction.    Cholelithiasis without CT evidence of cholecystitis.    Moderate sized right inguinal hernia containing unobstructed loops of small bowel.    Small bilateral pleural effusions. Small pericardial effusion. Generalized soft tissue edema.    < end of copied text > Lab Results:  CBC  CBC Full  -  ( 15 Oct 2021 07:19 )  WBC Count : 22.18 K/uL  RBC Count : 2.47 M/uL  Hemoglobin : 8.3 g/dL  Hematocrit : 25.3 %  Platelet Count - Automated : 258 K/uL  Mean Cell Volume : 102.4 fl  Mean Cell Hemoglobin : 33.6 pg  Mean Cell Hemoglobin Concentration : 32.8 gm/dL  Auto Neutrophil # : x  Auto Lymphocyte # : x  Auto Monocyte # : x  Auto Eosinophil # : x  Auto Basophil # : x  Auto Neutrophil % : x  Auto Lymphocyte % : x  Auto Monocyte % : x  Auto Eosinophil % : x  Auto Basophil % : x    .		Differential:	[] Automated		[] Manual  Chemistry                        8.3    22.18 )-----------( 258      ( 15 Oct 2021 07:19 )             25.3     10-15    144  |  117<H>  |  106<H>  ----------------------------<  147<H>  3.7   |  20<L>  |  2.62<H>    Ca    7.9<L>      15 Oct 2021 07:19    TPro  5.6<L>  /  Alb  1.7<L>  /  TBili  0.5  /  DBili  x   /  AST  13<L>  /  ALT  46  /  AlkPhos  127<H>  10-14    LIVER FUNCTIONS - ( 14 Oct 2021 18:47 )  Alb: 1.7 g/dL / Pro: 5.6 gm/dL / ALK PHOS: 127 U/L / ALT: 46 U/L / AST: 13 U/L / GGT: x           PT/INR - ( 14 Oct 2021 18:47 )   PT: 19.4 sec;   INR: 1.72 ratio         PTT - ( 14 Oct 2021 18:47 )  PTT:33.0 sec  Urinalysis Basic - ( 14 Oct 2021 18:47 )    Color: Yellow / Appearance: Slightly Turbid / S.010 / pH: x  Gluc: x / Ketone: Negative  / Bili: Negative / Urobili: Negative mg/dL   Blood: x / Protein: 30 mg/dL / Nitrite: Negative   Leuk Esterase: Moderate / RBC: 11-25 /HPF / WBC 11-25   Sq Epi: x / Non Sq Epi: Moderate / Bacteria: Few    RADIOLOGY RESULTS:    Prelim EKG AFIB with RVR     Prelim CXR no infiltrates or effusion    < from: CT Abdomen and Pelvis No Cont (10.14.21 @ 23:07) >      IMPRESSION:  Distended urinary bladder with large calculi in the dependent portion and small foci of air from recent instrumentation. Larkin catheter balloon within the prostate. Recommend advancement of the catheter. These findings were discussed with Dr. Ward of the emergency Department at 12:00 AM on 10/15/2021.    Moderate bilateral hydroureteronephrosis to the level of the bladder. Calculi in the distal left ureter measuring up to 6 mm at the pelvic brim and 2 mm proximal to the ureterovesical junction.    Cholelithiasis without CT evidence of cholecystitis.    Moderate sized right inguinal hernia containing unobstructed loops of small bowel.    Small bilateral pleural effusions. Small pericardial effusion. Generalized soft tissue edema.    < end of copied text >

## 2021-10-15 NOTE — H&P ADULT - HISTORY OF PRESENT ILLNESS
81 yo male with PMH of COPD, HTN, HLD, hemochromatosis presents to ED c/o abnormal lab results. Pt has not been eating and drinking for the past four days and had abnormal labs and was sent to the ED for evaluation    IN the ER patient received over 3L of IVF, Zosyn and vanco;  ICU was consulted and patient not an ICU candidate  79 yo male with PMH of COPD, HTN, HLD, hemochromatosis presents to ED c/o abnormal lab results. Pt has not been eating and drinking for the past four days and had abnormal labs and was sent to the ED for evaluation    IN the ER patient was septic, tachycardiac and hypotensive; went into AFIB with RVR;  received over 3L of IVF, Zosyn, Rocephin and vanco; the ER phsyician discussed with Urology Dr Isacc cummins.  ICU was consulted and patient not an ICU candidate; despite treatment SBP still in the 90s;  Had a detailed Discussed with patient and daughter and at this time they want to focus on comfort care, no further imagin or invasive procedures.

## 2021-10-15 NOTE — DISCHARGE NOTE PROVIDER - NSDCCPCAREPLAN_GEN_ALL_CORE_FT
PRINCIPAL DISCHARGE DIAGNOSIS  Diagnosis: Urinary tract obstruction by kidney stone  Assessment and Plan of Treatment: #Sepsis POA 2ndry to UTI associated with Larkin Cath   #Hypotension, Tachycardia and   #Urinary Obstruction with  ROBERT (acute kidney injury), L renal lesion, hematuria, hydronephrosis, multiple renal stones, prostamegaly  #FTT   -Discussed with daughter and patient at this time they dont want any further invasive procedures.  Wants to be strict comfort   -IV morphine 2mg IV Q2H PRN for pain or dyspnea   -IV Ativan 0.25mg IV Q6H PRN for agitation  -Patient refusing IVF and IV ABX        SECONDARY DISCHARGE DIAGNOSES  Diagnosis: Rapid atrial fibrillation  Assessment and Plan of Treatment:

## 2021-10-15 NOTE — CHART NOTE - NSCHARTNOTEFT_GEN_A_CORE
Awaiting GOC discussion by primary team and evaluation by urology    Improvement in hemodynamics with IVF    If urology decides on aggressive intervention and Pt requires higher level of care, please call back ICU

## 2021-10-15 NOTE — ED ADULT NURSE REASSESSMENT NOTE - NS ED NURSE REASSESS COMMENT FT1
Call placed x3 to MD Bonner to notify of pt BP with no answer. Pt remains awake and alert. Will reattempt to call MD.

## 2021-10-15 NOTE — CHART NOTE - NSCHARTNOTEFT_GEN_A_CORE
Discussed case with urology Dr. Navarrete who will evaluate patient. Recommends repeat imaging to evaluate if hydroureteronephrosis still present. Due to current status, may require nephrostomy. Awaiting call back from family to decide on pressor therapy, GOC. Declined HD. ICU consulted and evaluated.

## 2021-10-15 NOTE — ED ADULT NURSE REASSESSMENT NOTE - NS ED NURSE REASSESS COMMENT FT1
After removing pre-existent indwelling urinary catheter, prior to insertion of new catheter, kidney stone found at the tip of the penis. MD Ward aware. MD Bonner aware.

## 2021-10-15 NOTE — PROGRESS NOTE ADULT - SUBJECTIVE AND OBJECTIVE BOX
Urology consulted for ureteral stone, bladder stones, and urinary retention.  Discussed with daughter, who states that family and pt decline any intervention, opting for hospice.   please reconsult if GOC change

## 2021-10-15 NOTE — CONSULT NOTE ADULT - SUBJECTIVE AND OBJECTIVE BOX
ICU Progress Note    HPI:    S:    Pt seen and examined  HD # 1  PMHx COPD, HTN, HLD, hemochromatosis  presents to ED c/o abnormal lab results. Pt has not been eating and drinking for the past four days and had abnormal labs and was sent to the ED for evaluation    ICU consult for hypotension    10/15 AM: Made DNR/DNI/comfort measures. Appears comfortable.     ROS: c/o of being cold  Remainder of systems reviewed, negative    Allergies    No Known Allergies    Intolerances    MEDICATIONS  (STANDING):    MEDICATIONS  (PRN):  acetaminophen   Tablet .. 650 milliGRAM(s) Oral every 6 hours PRN Temp greater or equal to 38C (100.4F), Mild Pain (1 - 3)  aluminum hydroxide/magnesium hydroxide/simethicone Suspension 30 milliLiter(s) Oral every 4 hours PRN Dyspepsia  LORazepam   Injectable 0.25 milliGRAM(s) IV Push every 6 hours PRN Agitation  melatonin 3 milliGRAM(s) Oral at bedtime PRN Insomnia  morphine  - Injectable 2 milliGRAM(s) IV Push every 4 hours PRN Moderate Pain (4 - 6)  ondansetron Injectable 4 milliGRAM(s) IV Push every 8 hours PRN Nausea and/or Vomiting    Drug Dosing Weight    Height (cm): 190.5 (14 Oct 2021 17:16)  Weight (kg): 79.4 (14 Oct 2021 17:16)  BMI (kg/m2): 21.9 (14 Oct 2021 17:16)  BSA (m2): 2.07 (14 Oct 2021 17:16)    PAST MEDICAL & SURGICAL HISTORY:    COPD, moderate    Hypertension    Hyperlipidemia    History of hemochromatosis    Gout    Cellulitis  Right lower leg    COPD exacerbation    HTN (hypertension)    H/O ulcerative colitis    Hyperlipidemia    History of hemochromatosis    History of hemochromatosis    FAMILY HISTORY:    FH: breast cancer (Mother)    ROS: See HPI; otherwise, all systems reviewed and negative.    O:    ICU Vital Signs Last 24 Hrs  T(C): 36.7 (15 Oct 2021 07:43), Max: 36.7 (14 Oct 2021 17:16)  T(F): 98 (15 Oct 2021 07:43), Max: 98.1 (15 Oct 2021 00:29)  HR: 107 (15 Oct 2021 07:50) (68 - 147)  BP: 90/63 (15 Oct 2021 07:50) (70/60 - 116/84)  BP(mean): 89 (15 Oct 2021 07:43) (65 - 95)  ABP: --  ABP(mean): --  RR: 16 (15 Oct 2021 07:43) (15 - 20)  SpO2: 98% (15 Oct 2021 07:43) (95% - 100%)    I&O's Detail    14 Oct 2021 07:01  -  15 Oct 2021 07:00  --------------------------------------------------------  IN:  Total IN: 0 mL    OUT:    Voided (mL): 1600 mL  Total OUT: 1600 mL    Total NET: -1600 mL    PE:    Elderly M lying in bed  No JVD trachea midline  S1S2+  CTA B/L  Abd soft NTND  1+ edema B/L LE  Awake, alert but confused  Skin pink, well perfused    LABS:    CBC Full  -  ( 15 Oct 2021 07:19 )  WBC Count : 22.18 K/uL  RBC Count : 2.47 M/uL  Hemoglobin : 8.3 g/dL  Hematocrit : 25.3 %  Platelet Count - Automated : 258 K/uL  Mean Cell Volume : 102.4 fl  Mean Cell Hemoglobin : 33.6 pg  Mean Cell Hemoglobin Concentration : 32.8 gm/dL  Auto Neutrophil # : x  Auto Lymphocyte # : x  Auto Monocyte # : x  Auto Eosinophil # : x  Auto Basophil # : x  Auto Neutrophil % : x  Auto Lymphocyte % : x  Auto Monocyte % : x  Auto Eosinophil % : x  Auto Basophil % : x    10-15    144  |  117<H>  |  106<H>  ----------------------------<  147<H>  3.7   |  20<L>  |  2.62<H>    Ca    7.9<L>      15 Oct 2021 07:19    TPro  5.6<L>  /  Alb  1.7<L>  /  TBili  0.5  /  DBili  x   /  AST  13<L>  /  ALT  46  /  AlkPhos  127<H>  10-14    PT/INR - ( 14 Oct 2021 18:47 )   PT: 19.4 sec;   INR: 1.72 ratio         PTT - ( 14 Oct 2021 18:47 )  PTT:33.0 sec  Urinalysis Basic - ( 14 Oct 2021 18:47 )    Color: Yellow / Appearance: Slightly Turbid / S.010 / pH: x  Gluc: x / Ketone: Negative  / Bili: Negative / Urobili: Negative mg/dL   Blood: x / Protein: 30 mg/dL / Nitrite: Negative   Leuk Esterase: Moderate / RBC: 11-25 /HPF / WBC 11-25   Sq Epi: x / Non Sq Epi: Moderate / Bacteria: Few      CAPILLARY BLOOD GLUCOSE      LIVER FUNCTIONS - ( 14 Oct 2021 18:47 )  Alb: 1.7 g/dL / Pro: 5.6 gm/dL / ALK PHOS: 127 U/L / ALT: 46 U/L / AST: 13 U/L / GGT: x               
HPI: Pt is a 80y old Male with hx of COPD, HTN, HLD, hemochromatosis presents to ED c/o abnormal lab results. Pt has not been eating and drinking for the past four days and had abnormal labs and was sent to the ED for evaluation IN the ER patient was septic, tachycardiac and hypotensive; went into AFIB with RVR;  received over 3L of IVF, Zosyn, Rocephin and vanco; the ER physician discussed with Urology Dr Isacc cummins.  ICU was consulted and patient not an ICU candidate; despite treatment SBP still in the 90s;  Had a detailed Discussed with patient and daughter and at this time they want to focus on comfort care, no further imagin or invasive procedures.      10/15/2021 patient seen and examined with  daughter at bedside, at this time patient has received one dose of morphine which patient appears more comfortable at this time, patient is unresponsive at this time, daughter at bedside requesting referral to in hospice, to manage symptoms only       PAIN: ( x)Yes   ( )No  patient was moaning in pain     DYSPNEA: (x ) Yes  ( ) No  Level: moderate - severe      PAST MEDICAL & SURGICAL HISTORY:  COPD, moderate  Hypertension  Hyperlipidemia  History of hemochromatosis  Gout  Cellulitis Right lower leg  COPD exacerbation  HTN (hypertension)  H/O ulcerative colitis  Hyperlipidemia  History of hemochromatosis  History of hemochromatosis    SOCIAL HX: from SNF     Hx opiate tolerance ( )YES  ( x)NO    Baseline ADLs  (Prior to Admission)  (x ) Independent   ( )Dependent    FAMILY HISTORY:  FH: breast cancer (Mother)    Review of Systems:    Unable to obtain/Limited due to: patient unresponsive       PHYSICAL EXAM:    Vital Signs Last 24 Hrs  T(C): 36.3 (15 Oct 2021 11:30), Max: 36.7 (14 Oct 2021 17:16)  T(F): 97.4 (15 Oct 2021 11:30), Max: 98.1 (15 Oct 2021 00:29)  HR: 94 (15 Oct 2021 11:30) (68 - 147)  BP: 110/63 (15 Oct 2021 11:30) (70/60 - 116/84)  BP(mean): 89 (15 Oct 2021 07:43) (65 - 95)  RR: 20 (15 Oct 2021 11:30) (15 - 20)  SpO2: 98% (15 Oct 2021 11:30) (95% - 100%)  Daily Height in cm: 190.5 (14 Oct 2021 17:16)    Daily     PPSV2: 10  %    General: ill appearing male in bed, increased respiratory effort   Mental Status: unresponsive   HEENT: dry oral mucosa   Lungs: diminished breath sounds   Cardiac: s1s2+   GI: nontender, non distended, +BS  Ext: no edema   Neuro: unresponsive     LABS:                        8.3    22.18 )-----------( 258      ( 15 Oct 2021 07:19 )             25.3     10-15    144  |  117<H>  |  106<H>  ----------------------------<  147<H>  3.7   |  20<L>  |  2.62<H>    Ca    7.9<L>      15 Oct 2021 07:19  TPro  5.6<L>  /  Alb  1.7<L>  /  TBili  0.5  /  DBili  x   /  AST  13<L>  /  ALT  46  /  AlkPhos  127<H>  10-14  PT/INR - ( 14 Oct 2021 18:47 )   PT: 19.4 sec;   INR: 1.72 ratio    PTT - ( 14 Oct 2021 18:47 )  PTT:33.0 sec    Albumin: Albumin, Serum: 1.7 g/dL (10-14 @ 18:47)    Allergies- No Known Allergies    MEDICATIONS  (STANDING):  influenza   Vaccine 0.5 milliLiter(s) IntraMuscular once    MEDICATIONS  (PRN):  acetaminophen   Tablet .. 650 milliGRAM(s) Oral every 6 hours PRN Temp greater or equal to 38C (100.4F), Mild Pain (1 - 3)  aluminum hydroxide/magnesium hydroxide/simethicone Suspension 30 milliLiter(s) Oral every 4 hours PRN Dyspepsia  LORazepam   Injectable 0.25 milliGRAM(s) IV Push every 6 hours PRN Agitation  melatonin 3 milliGRAM(s) Oral at bedtime PRN Insomnia  morphine  - Injectable 2 milliGRAM(s) IV Push every 4 hours PRN Moderate Pain (4 - 6)  ondansetron Injectable 4 milliGRAM(s) IV Push every 8 hours PRN Nausea and/or Vomiting      RADIOLOGY/ADDITIONAL STUDIES:    < from: CT Abdomen and Pelvis No Cont (10.14.21 @ 23:07) >  EXAM:  CT ABDOMEN AND PELVIS                            PROCEDURE DATE:  10/14/2021          INTERPRETATION:  CLINICAL INFORMATION: Acute renal failure.    COMPARISON: 9/4/2021.    CONTRAST/COMPLICATIONS:  IV Contrast: NONE  Oral Contrast: NONE  Complications: None reported at time of study completion    PROCEDURE:  CT of the Abdomen and Pelvis was performed.  Sagittal and coronal reformats were performed.    FINDINGS:  LOWER CHEST: Small bilateral pleural effusions. Subsegmental atelectasis inthe right lower lobe. Coronary artery atherosclerotic calcifications. Small pericardial effusion.    LIVER: Within normal limits.  BILE DUCTS: Normal caliber.  GALLBLADDER: Cholelithiasis. No pericholecystic inflammatory changes.  SPLEEN: Within normal limits.  PANCREAS: Within normal limits.  ADRENALS: Within normal limits.  KIDNEYS/URETERS: Moderate bilateral hydroureteronephrosis to the level of the bladder. Calculi in the distal left ureter measuring up to 6 mm at the pelvic brim and 2 mm just proximal to the ureterovesical junction. Additional nonobstructing left intrarenal calculi. Small exophytic right renal cyst.  BLADDER: Distended urinary bladder with large calculi in the dependent portion measuring up to 3 cm. Small foci of air in the urinary bladder.  REPRODUCTIVE ORGANS: Larkin catheter balloon within the prostate. Prostate is enlarged.  BOWEL: Small hiatus hernia. No bowel obstruction. Appendix is normal.  PERITONEUM: No ascites or pneumoperitoneum. No mesenteric lymphadenopathy.  VESSELS: Atherosclerotic calcifications of the aortoiliac tree. Normal caliber abdominal aorta.  RETROPERITONEUM/LYMPH NODES: No lymphadenopathy.  ABDOMINAL WALL: Moderate sized right inguinal hernia containing unobstructed loops of small bowel. Small left inguinal hernia containing fat. Generalized soft tissue edema.  BONES: Multilevel degenerative changes of the spine and chronic compression fracture of T11 and T12.    IMPRESSION:  Distended urinary bladder with large calculi in the dependent portion and small foci of air from recent instrumentation. Larkin catheter balloon within the prostate. Recommend advancement of the catheter. These findings were discussed with Dr. Ward of the emergency Department at 12:00 AM on 10/15/2021.  Moderate bilateral hydroureteronephrosis to the level of the bladder. Calculi in the distal left ureter measuring up to 6 mm at the pelvic brim and 2 mm proximal to the ureterovesical junction.  Cholelithiasis without CT evidence of cholecystitis.  Moderate sized right inguinal hernia containing unobstructed loops of small bowel.  Small bilateral pleural effusions. Small pericardial effusion. Generalized soft tissue edema.

## 2021-10-15 NOTE — ED ADULT NURSE REASSESSMENT NOTE - NS ED NURSE REASSESS COMMENT FT1
pt alert, disoriented, pt's bp is 99/63, hr 100, dtr at the bedside, pt resting comfortably on stretcher, +weeping generalized edema noted.  pt transported to

## 2021-10-15 NOTE — DISCHARGE NOTE PROVIDER - HOSPITAL COURSE
History of Present Illness:   81 yo male with PMH of COPD, HTN, HLD, hemochromatosis presents to ED c/o abnormal lab results. Pt has not been eating and drinking for the past four days and had abnormal labs and was sent to the ED for evaluation    IN the ER patient was septic, tachycardiac and hypotensive; went into AFIB with RVR;  received over 3L of IVF, Zosyn, Rocephin and vanco; the ER phsyician discussed with Urology Dr Isacc cummins.  ICU was consulted and patient not an ICU candidate; despite treatment SBP still in the 90s;  Had a detailed Discussed with patient and daughter and at this time they want to focus on comfort care, no further imagin or invasive procedures.        #Sepsis POA 2ndry to UTI associated with Larkin Cath   #Hypotension, Tachycardia and   #Urinary Obstruction with  ROBERT (acute kidney injury), L renal lesion, hematuria, hydronephrosis, multiple renal stones, prostamegaly  #FTT   -Discussed with daughter and patient at this time they dont want any further invasive procedures.  Wants to be strict comfort   -IV morphine 2mg IV Q6H PRN for pain or dyspnea   -IV Ativan 0.25mg IV Q6H PRN for agitation  -Patient refusing IVF and IV ABX  -SW consult for inpatient hospice  -Palliative consult to help with comfort meds     #Atrial fibrillation with RVR  #Echo with normal LV systolic function, mild pulm HTN  -Hold metoprolol tartrate 25mg bid for rate control.  if BP is >110  -Hold Eliquis 5mg bid for anticoagulation.    #Hypertension.   - hold Metoprolol with BP parameters   - Hold Lisinopril, Furosemide due to ROBERT  and low BP    #Hyperlipidemia.   -Continue Lipitor.    #COPD (chronic obstructive pulmonary disease).   -hold Symbicort and Spiriva.   -Oxygen supplementation as needed.    #DVT Prophylactic measure.   -Hold On Eliquis 5mg bid for anticoagulation for possible intervention with IR or Urology    # Advanced Care Planning 35 minutes.  Discussion with patient and family regarding advance directives. At this time they would like to be DNR/DNI. No ABX, IVF, Feeding Tube, STOP ALL PO meds; No blood draws. Strict comfort measure only; Do not send back to the hospital;  Form signed

## 2021-10-20 LAB
CULTURE RESULTS: SIGNIFICANT CHANGE UP
SPECIMEN SOURCE: SIGNIFICANT CHANGE UP
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
## 2021-10-21 DIAGNOSIS — A41.9 SEPSIS, UNSPECIFIED ORGANISM: ICD-10-CM

## 2021-10-21 DIAGNOSIS — J44.9 CHRONIC OBSTRUCTIVE PULMONARY DISEASE, UNSPECIFIED: ICD-10-CM

## 2021-10-21 DIAGNOSIS — I48.91 UNSPECIFIED ATRIAL FIBRILLATION: ICD-10-CM

## 2021-10-21 DIAGNOSIS — E78.5 HYPERLIPIDEMIA, UNSPECIFIED: ICD-10-CM

## 2021-10-21 DIAGNOSIS — N17.9 ACUTE KIDNEY FAILURE, UNSPECIFIED: ICD-10-CM

## 2021-10-21 DIAGNOSIS — N20.0 CALCULUS OF KIDNEY: ICD-10-CM

## 2021-10-21 DIAGNOSIS — Z79.01 LONG TERM (CURRENT) USE OF ANTICOAGULANTS: ICD-10-CM

## 2021-10-21 DIAGNOSIS — R62.7 ADULT FAILURE TO THRIVE: ICD-10-CM

## 2021-10-21 DIAGNOSIS — T83.511A INFECTION AND INFLAMMATORY REACTION DUE TO INDWELLING URETHRAL CATHETER, INITIAL ENCOUNTER: ICD-10-CM

## 2021-10-21 DIAGNOSIS — Z79.899 OTHER LONG TERM (CURRENT) DRUG THERAPY: ICD-10-CM

## 2021-10-21 DIAGNOSIS — N13.6 PYONEPHROSIS: ICD-10-CM

## 2021-10-21 DIAGNOSIS — I10 ESSENTIAL (PRIMARY) HYPERTENSION: ICD-10-CM
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
## 2023-12-22 NOTE — PATIENT PROFILE ADULT - DOES PATIENT HAVE ADVANCE DIRECTIVE
Daily Note     Today's date: 2023  Patient name: Toribio Hernández  : 1958  MRN: [id number]  Referring provider: Tony Woods PT  Dx:   Encounter Diagnosis     ICD-10-CM    1. Acute neck pain  M54.2                      Subjective: Pt reports a resolution of scap pain, less frequent L arm pain.      Objective: See treatment diary below    Assessment: Partial centralization achieved with supine retraction, extension, Rot.    Plan: Continue per plan of care.      Precautions: none.    DX: lower cervical derangement with a ret, ext DP.    Manuals        Seated ret with clin OP 1x10 1x10 1x10 2x10 1x10        Seated ret mobs 1x10 1x10 1x10 2x10 1x10        Supine ret, ext clin OP   1x10 1x10 2x10 2x10                    Neuro Re-Ed             Posture education 10 min 5 min 5 min 3 min 2 min 1 min       Posture over-correction 1x10 2x15 2x15 2x15 2x15 1x15       Seated c/s retraction 5x10 3x10 3x10 2x10 2x10 1x15       standing c/s retraction with pt OP 2x15 3x10 3x10 2x10 2x10 1x15       Standing c/s ret, ext 5x10 5x10 3x10 2x10 2x10 1x15       Standing c/s ret, ext pt OP   4x10 4x10 4x10 2x15       Supine c/s ret, ext    2x10 2x10 2x10                    Ther Ex                                                                                                                     Ther Activity                                       Gait Training                                       Modalities                                             Yes

## 2024-05-16 NOTE — PROVIDER CONTACT NOTE (OTHER) - REASON
Patient unable to void, bladder scan 465 mL, bright red blood oozing from meatus of penis
No void, bladder scan 50 mL
irregular heart rate
abdominal pain

## 2024-05-23 NOTE — PROCEDURAL SAFETY CHECKLIST WITH OR WITHOUT SEDATION - NSPREPROCLOCFT_GEN_ALL_CORE
[id number]
[de-identified] : unable to assess due to telehealth visit technical difficulties transitioned to TTM

## 2024-09-05 NOTE — DISCHARGE NOTE PROVIDER - NSDCDCMDCOMP_GEN_ALL_CORE
Dr Gomez made aware of [facility] lab results. Made aware patient refused straight cath for urine specimen. Dr Gomez states that liver enzimes are abnormal and urine specimen is really, really needed.   This document is complete and the patient is ready for discharge.

## 2025-02-08 NOTE — PHYSICAL THERAPY INITIAL EVALUATION ADULT - PLANNED THERAPY INTERVENTIONS, PT EVAL
Physical Therapy    Visit Type: treatment  SUBJECTIVE      Patient participated in all scheduled therapy time this session.   used.  Confirmed WBAT order with RN prior to session.  Pt c/o L shoulder pain with transfers      Patient / Family Goal: return home and maximize function    Pain     Location: c/o L shoulder pain - not rated     OBJECTIVE      Patient Activity Tolerance: 1 to 2 activity to rest         Transfers  Assistive devices: gait belt, 2-wheeled walker, parallel bars  - Sit to stand: minimal assist, moderate assist  - Stand to sit: minimal assist  - Stand pivot: minimal assist  Assist for force production and balance with 1st transfer with WBAT on LLE. C/o L shoulder pain with stand pivot transfer with 2ww. Reps of sit <> stand within parallel bar x 3 reps    Ambulation / Gait  - Assistive device: parallel bars, gait  and follow with wheelchair  - Distance (feet unless otherwise indicated): 10  - Assist Level: minimal assist and total assist - dependent  - Description: step to and shuffling    Gait trial with WBAT on LLE with use of parallel bars and chair follow. Assist for weight shift and safety due to LE weakness        Interventions  Skilled input: verbal instruction/cues  Verbal Consent: Writer verbally educated and received verbal consent for hand placement, positioning of patient, and techniques to be performed today from patient          ASSESSMENT   Impairments: balance, activity tolerance, strength, desire/motivation, safety awareness, range of motion and pain  Functional Limitations: all functional mobility    Mobility status updated with ortho services seeing pt on 2/7/25 and advancing activity order to WBAT on LLE with new total contact weightbearing cast.  Focused session on sit <> stand and stand pivot transfers with 2ww and parallel bars and gait trial with use of parallel bars and chair follow.  Pt's mobility is limited by generalized weakness and bilateral shoulder  pain (worse on L) which limits balance and activity tolerance.  Will continue to see pt in PT to progress mobility and safety         Discharge Recommendations  Recommendation for Discharge Location: PT [place]: Intensive therapy/oversight of PMR physician  PT/OT Mobility Equipment for Discharge: likely subacute transfer, if home d/c will need wheelchair, ramp, and 2ww - will continue to follow  PT/OT ADL Equipment for Discharge: continue to monitor, pending discharge location  PT Identified Barriers to Discharge: assist for mobility        Progress: slow progress, decreased activity tolerance and slow progress, medical status limitations    Education:   - Present and ready to learn: patient  Education provided during session:  - See body of note.   - Results of above outlined education: Needs reinforcement    Patient at End of Session:   Location: in chair  Safety measures: alarm system in place/re-engaged and call light within reach  Handoff to: nurse assistant      Assistant to continue with current plan of care, current goals are appropriate, patient progressing towards set goals      PLAN   Suggestions for next session as indicated: Treatment plan for next session 2/9: LLE weightbearing status upgraded to WBAT with new total contact weightbearing cast on 2/7. Monitor severe bilateral shoulder pain (present at baseline). Transfer training with 2ww vs parallel bars. Gait training as tolerated with parallel bars vs 2ww plus chair follow. Standing tolerance training, Bilateral lower extremity strength and range of motion Can not self propel w/c due to BUE pain.   Outcome measures: none completed yet  EKSO interventions:  not ambulatory at this time  Plan considerations: Per ortho consult on 2/7: Weight bearing as tolerated with left leg in the new total contact weightbearing cast.  Family/care partner training details:   Therapy equipment in patient room:        Interventions: balance, bed mobility, body mechanics,  continued evaluation, neuromuscular re-education, ROM, strengthening, wheelchair mobility, safety education and patient/family training   Frequency: 5-7 days per week   Frequency Comments: 30 minutes per day, modified program   Duration:   Agreement to plan and goals: patient agrees with goals and treatment plan      GOALS  Review date: 2/10/2025  Short Term Goals (STGs): to be met 7 days from date established, unless otherwise stated.   - Patient will complete all bed mobility at supervision assist level.  MET   - Patient will perform sit to/from stand at supervision level up to wheeled walker - pt refused  - Patient will tolerate 6 minutes standing in roopa stedy maintaining left lower extremity NWB at supervision assist level - partially met  -Patient will perform right directed squat pivot transfers at supervision assist level -progressing  Long Term Goals (LTGs): to be met by discharge from rehab program.   - Patient will complete all bed mobility at modified Independent level    - Patient will perform sit to/from stand at supervision level with non mechanical lift   - Patient will perform pivot transfers with supervision from family using non mechanical lift   Status: all LTGs progressing  Documented in the chart in the following areas:  Services. Assessment/Plan.      Therapy procedure time and total treatment time can be found documented on the Time Entry flowsheet   bed mobility training/gait training/ROM/strengthening/transfer training

## 2025-02-11 NOTE — PATIENT PROFILE ADULT - ARRIVAL FROM
Detail Level: Detailed Number Of Curettages: 4 Size Of Lesion In Cm: 3.4 Add Intralesional Injection: No Concentration (Mg/Ml Or Millions Of Plaque Forming Units/Cc): 0.01 Total Volume (Ccs): 1 Anesthesia Type: 1% lidocaine with epinephrine and a 1:10 solution of 8.4% sodium bicarbonate Anesthesia Volume In Cc: 3 Cautery Type: electrodesiccation What Was Performed First?: Curettage Final Size Statement: The size of the lesion after curettage was Additional Information: (Optional): The wound was cleaned, and a pressure dressing was applied.  The patient received detailed post-op instructions. Home Consent was obtained from the patient. The risks, benefits and alternatives to therapy were discussed in detail. Specifically, the risks of infection, scarring, bleeding, prolonged wound healing, nerve injury, incomplete removal, allergy to anesthesia and recurrence were addressed. Alternatives to ED&C, such as: surgical removal and XRT were also discussed.  Prior to the procedure, the treatment site was clearly identified and confirmed by the patient. All components of Universal Protocol/PAUSE Rule completed. Post-Care Instructions: I reviewed with the patient in detail post-care instructions. Patient is to keep the area dry for 48 hours, and not to engage in any swimming until the area is healed. Should the patient develop any fevers, chills, bleeding, severe pain patient will contact the office immediately. Bill As A Line Item Or As Units: Line Item
